# Patient Record
Sex: MALE | Race: WHITE | Employment: OTHER | URBAN - METROPOLITAN AREA
[De-identification: names, ages, dates, MRNs, and addresses within clinical notes are randomized per-mention and may not be internally consistent; named-entity substitution may affect disease eponyms.]

---

## 2017-01-02 RX ORDER — METFORMIN HYDROCHLORIDE 500 MG/1
TABLET, EXTENDED RELEASE ORAL
Qty: 90 TAB | Refills: 1 | Status: SHIPPED | OUTPATIENT
Start: 2017-01-02 | End: 2017-10-20 | Stop reason: SDUPTHER

## 2017-01-30 RX ORDER — ATORVASTATIN CALCIUM 10 MG/1
TABLET, FILM COATED ORAL
Qty: 30 TAB | Refills: 2 | Status: SHIPPED | OUTPATIENT
Start: 2017-01-30 | End: 2017-05-09 | Stop reason: SDUPTHER

## 2017-07-09 RX ORDER — ATORVASTATIN CALCIUM 10 MG/1
TABLET, FILM COATED ORAL
Qty: 30 TAB | Refills: 0 | Status: SHIPPED | OUTPATIENT
Start: 2017-07-09 | End: 2017-09-09 | Stop reason: SDUPTHER

## 2017-10-20 NOTE — LETTER
10/23/2017 9:34 AM 
 
Mr. Mckeon Angry [address] [facility] Dear Mr. Johnson Jerica: 
 
Quan Rosales missed you! Please call our office at [phone] and schedule a follow up appointment for your continued care. Patient overdue for appointment with Dr Tony Drew for follow up on issues and medications. Sincerely, Jad Hernandez MD

## 2017-10-22 RX ORDER — METFORMIN HYDROCHLORIDE 500 MG/1
TABLET, EXTENDED RELEASE ORAL
Qty: 90 TAB | Refills: 1 | Status: SHIPPED | OUTPATIENT
Start: 2017-10-22 | End: 2018-03-31 | Stop reason: SDUPTHER

## 2017-10-22 RX ORDER — ATORVASTATIN CALCIUM 10 MG/1
TABLET, FILM COATED ORAL
Qty: 30 TAB | Refills: 0 | Status: SHIPPED | OUTPATIENT
Start: 2017-10-22 | End: 2017-12-16 | Stop reason: SDUPTHER

## 2017-10-23 ENCOUNTER — TELEPHONE (OUTPATIENT)
Dept: INTERNAL MEDICINE CLINIC | Age: 62
End: 2017-10-23

## 2017-12-17 RX ORDER — ATORVASTATIN CALCIUM 10 MG/1
TABLET, FILM COATED ORAL
Qty: 30 TAB | Refills: 0 | Status: SHIPPED | OUTPATIENT
Start: 2017-12-17 | End: 2018-02-02 | Stop reason: SDUPTHER

## 2017-12-18 ENCOUNTER — OFFICE VISIT (OUTPATIENT)
Dept: INTERNAL MEDICINE CLINIC | Age: 62
End: 2017-12-18

## 2017-12-18 VITALS
WEIGHT: 243 LBS | HEART RATE: 80 BPM | OXYGEN SATURATION: 97 % | BODY MASS INDEX: 34.02 KG/M2 | SYSTOLIC BLOOD PRESSURE: 149 MMHG | HEIGHT: 71 IN | TEMPERATURE: 98 F | RESPIRATION RATE: 15 BRPM | DIASTOLIC BLOOD PRESSURE: 79 MMHG

## 2017-12-18 DIAGNOSIS — J06.9 UPPER RESPIRATORY TRACT INFECTION, UNSPECIFIED TYPE: ICD-10-CM

## 2017-12-18 DIAGNOSIS — T78.3XXA ANGIOEDEMA, INITIAL ENCOUNTER: ICD-10-CM

## 2017-12-18 DIAGNOSIS — E11.9 TYPE 2 DIABETES MELLITUS WITHOUT COMPLICATION, WITHOUT LONG-TERM CURRENT USE OF INSULIN (HCC): Primary | ICD-10-CM

## 2017-12-18 NOTE — PROGRESS NOTES
Livia Dotson for a week    Subjective: This is a 60year old white male with diabetes and hyperlipidemia and possibly hypertension. Chief complaint:  Yesterday he developed some itching on his arms and developed swelling of his tongue out of the blue. The bottom of his tongue was a little swollen. His lips were not swollen. The back of his throat was not swollen. He took a Benadryl and sat in his recliner and it got better after two doses. He had this once before one to one and a half years ago. He was last seen by me in 2016. He takes Metformin daily and takes Lipitor daily. He used to take aspirin daily, but has not been doing that. He has no new meds, no blood pressure meds. He has a cold, which has been slowly improving. He has a little bit of cough and some yellow green nasal mucus. He has been very busy over the last few years. His wife was diagnosed with leukemia and they have been traveling back and forth to [facility] in [place]. He has not eaten a lot of seafood. Had some nuts yesterday. No other exact cause for the angioedema. Physical Examination:  His blood pressure today was 140/80. His lips were not swollen, his tongue was not swollen. The frenulum was slightly swollen. The posterior pharynx was normal.  Chest was clear. Regular heart rhythm. No sinus tenderness. Impression/Plan:  1. Angioedema, etiology not clear. Routine lab work. He's advised an allergy referral should it continue. Benadryl prn is a good idea. 2. Monitoring his blood pressure, monitoring blood sugars are important. Diet and exercise are important. He's given a slip to complete lab work. 3. URI. Will resolve. Prn Mucinex and OTC meds. 4. History of nasal spray dependence. Would like to switch him to Flonase to help him get off of the nasal spray.     Patient Active Problem List    Diagnosis    Lymphadenopathy    Dyslipidemia (high LDL; low HDL)    Asthma    Arthralgia of knee    Diabetes mellitus ([facility] [address] [address]    Diverticular disease of left colon    BPH (benign prostatic hyperplasia)    ED (erectile dysfunction)    Gout     Pain free       Vitals:    12/18/17 1631   BP: 149/79   Pulse: 80   Resp: 15   Temp: 98 °F (36.7 °C)   TempSrc: Oral   SpO2: 97%   Weight: 243 lb (110.2 kg)   Height: 5' 11\" (1.803 m)     Wt Readings from Last 3 Encounters:   12/18/17 243 lb (110.2 kg)   03/17/16 237 lb (107.5 kg)   02/02/16 242 lb (109.8 kg)     1. Type 2 diabetes mellitus without complication, without long-term current use of insulin ([facility] [address]  Home reasdiongs ok  - CBC WITH AUTOMATED DIFF  - LIPID PANEL  - METABOLIC PANEL, COMPREHENSIVE  - MICROALBUMIN, UR, RAND W/ MICROALBUMIN/CREA RATIO  - HEMOGLOBIN A1C W/O EAG  - URIC ACID    2. Upper respiratory tract infection, unspecified type  benign    3. Angioedema, initial encounter  cause unknown  - CBC WITH AUTOMATED DIFF    Hypertension (elevated blood pressure)  - My Recommendations  -Purchase a blood pressure cuff that goes around your upper arm and check blood pressure at least 3 times per week. Write down your numbers for review. Check blood pressure in the morning and evening. Rest for 5 minutes with feet elevated and arm resting on a table (at mid-chest level) when checking blood pressure  -Exercise 30-60 minutes most days of the week, preferably with a mix of cardiovascular and strength training. Exercise can improve blood pressure, even if you do not lose weight!  -Limit alcohol intake to less than 2-3 drinks daily   -Avoid tobacco products  -Avoid caffeine, energy drinks, stimulants  -DASH diet - includes fruits, vegetables, fiber, and less than 2000 mg sodium (salt) daily. Try to eat less than 3547-4680 calories daily.    Limit fat intake.    -Avoid non-steroidal inflammatory medications (NSAIDS) such as ibuprofen, advil, motrin, aleve, and naproxyn as these may increase blood pressure if used long-term  -Avoid OTC decongestants such as pseudopherine, phenylephrine, Afrin'

## 2017-12-18 NOTE — PROGRESS NOTES
Coordination of Care Questions    1. Have you been to the ER, urgent care clinic since your last visit? No       Hospitalized since your last visit? No    2. Have you seen or consulted any other health care providers outside of the [address] [place] [address] since your last visit? Include any pap smears or colon screening.  No

## 2018-02-03 RX ORDER — ATORVASTATIN CALCIUM 10 MG/1
TABLET, FILM COATED ORAL
Qty: 30 TAB | Refills: 0 | Status: SHIPPED | OUTPATIENT
Start: 2018-02-03 | End: 2018-03-31 | Stop reason: SDUPTHER

## 2018-05-08 RX ORDER — ATORVASTATIN CALCIUM 10 MG/1
TABLET, FILM COATED ORAL
Qty: 90 TAB | Refills: 1 | Status: SHIPPED | OUTPATIENT
Start: 2018-05-08 | End: 2018-08-13 | Stop reason: SDUPTHER

## 2018-05-15 ENCOUNTER — TELEPHONE (OUTPATIENT)
Dept: INTERNAL MEDICINE CLINIC | Age: 63
End: 2018-05-15

## 2018-05-15 NOTE — TELEPHONE ENCOUNTER
Pt would like for the doctor to resend his lab work to his home address at [address] [place] [address] or e-mail it ([email or web address])   Best contact: [phone]              From answering service

## 2018-08-07 LAB
ALBUMIN SERPL-MCNC: 4.4 G/DL (ref 3.6–4.8)
ALBUMIN/CREAT UR: 6.7 MG/G CREAT (ref 0–30)
ALBUMIN/GLOB SERPL: 1.8 {RATIO} (ref 1.2–2.2)
ALP SERPL-CCNC: 74 IU/L (ref 39–117)
ALT SERPL-CCNC: 50 IU/L (ref 0–44)
AST SERPL-CCNC: 44 IU/L (ref 0–40)
BASOPHILS # BLD AUTO: 0 X10E3/UL (ref 0–0.2)
BASOPHILS NFR BLD AUTO: 0 %
BILIRUB SERPL-MCNC: 0.8 MG/DL (ref 0–1.2)
BUN SERPL-MCNC: 13 MG/DL (ref 8–27)
BUN/CREAT SERPL: 14 (ref 10–24)
CALCIUM SERPL-MCNC: 9.2 MG/DL (ref 8.6–10.2)
CHLORIDE SERPL-SCNC: 96 MMOL/L (ref 96–106)
CHOLEST SERPL-MCNC: 154 MG/DL (ref 100–199)
CO2 SERPL-SCNC: 26 MMOL/L (ref 20–29)
CREAT SERPL-MCNC: 0.92 MG/DL (ref 0.76–1.27)
CREAT UR-MCNC: 78.8 MG/DL
EOSINOPHIL # BLD AUTO: 0.2 X10E3/UL (ref 0–0.4)
EOSINOPHIL NFR BLD AUTO: 3 %
ERYTHROCYTE [DISTWIDTH] IN BLOOD BY AUTOMATED COUNT: 15.5 % (ref 12.3–15.4)
GLOBULIN SER CALC-MCNC: 2.5 G/DL (ref 1.5–4.5)
GLUCOSE SERPL-MCNC: 223 MG/DL (ref 65–99)
HBA1C MFR BLD: 8 % (ref 4.8–5.6)
HCT VFR BLD AUTO: 40.9 % (ref 37.5–51)
HDLC SERPL-MCNC: 33 MG/DL
HGB BLD-MCNC: 14 G/DL (ref 13–17.7)
IMM GRANULOCYTES # BLD: 0 X10E3/UL (ref 0–0.1)
IMM GRANULOCYTES NFR BLD: 0 %
INTERPRETATION, 910389: NORMAL
LDLC SERPL CALC-MCNC: 73 MG/DL (ref 0–99)
LYMPHOCYTES # BLD AUTO: 1.2 X10E3/UL (ref 0.7–3.1)
LYMPHOCYTES NFR BLD AUTO: 24 %
Lab: NORMAL
MCH RBC QN AUTO: 30.3 PG (ref 26.6–33)
MCHC RBC AUTO-ENTMCNC: 34.2 G/DL (ref 31.5–35.7)
MCV RBC AUTO: 89 FL (ref 79–97)
MICROALBUMIN UR-MCNC: 5.3 UG/ML
MONOCYTES # BLD AUTO: 0.3 X10E3/UL (ref 0.1–0.9)
MONOCYTES NFR BLD AUTO: 7 %
NEUTROPHILS # BLD AUTO: 3.4 X10E3/UL (ref 1.4–7)
NEUTROPHILS NFR BLD AUTO: 66 %
PLATELET # BLD AUTO: 103 X10E3/UL (ref 150–379)
POTASSIUM SERPL-SCNC: 4.5 MMOL/L (ref 3.5–5.2)
PROT SERPL-MCNC: 6.9 G/DL (ref 6–8.5)
RBC # BLD AUTO: 4.62 X10E6/UL (ref 4.14–5.8)
SODIUM SERPL-SCNC: 138 MMOL/L (ref 134–144)
TRIGL SERPL-MCNC: 239 MG/DL (ref 0–149)
URATE SERPL-MCNC: 6.9 MG/DL (ref 3.7–8.6)
VLDLC SERPL CALC-MCNC: 48 MG/DL (ref 5–40)
WBC # BLD AUTO: 5.1 X10E3/UL (ref 3.4–10.8)

## 2018-08-07 RX ORDER — METFORMIN HYDROCHLORIDE 500 MG/1
1500 TABLET, EXTENDED RELEASE ORAL
Qty: 270 TAB | Refills: 1 | Status: SHIPPED | OUTPATIENT
Start: 2018-08-07 | End: 2019-02-01 | Stop reason: SDUPTHER

## 2018-08-08 NOTE — PROGRESS NOTES
Spoke with patient advised per Dr Jennifer Albarado that Diabetes poor increase metformin to 1500 mg per day.  Scheduled an appointment for a 3 months follow up

## 2018-08-13 ENCOUNTER — OFFICE VISIT (OUTPATIENT)
Dept: INTERNAL MEDICINE CLINIC | Age: 63
End: 2018-08-13

## 2018-08-13 VITALS
SYSTOLIC BLOOD PRESSURE: 129 MMHG | BODY MASS INDEX: 33.52 KG/M2 | TEMPERATURE: 96.2 F | WEIGHT: 239.4 LBS | DIASTOLIC BLOOD PRESSURE: 69 MMHG | OXYGEN SATURATION: 98 % | HEART RATE: 63 BPM | HEIGHT: 71 IN | RESPIRATION RATE: 18 BRPM

## 2018-08-13 DIAGNOSIS — E78.5 DYSLIPIDEMIA (HIGH LDL; LOW HDL): ICD-10-CM

## 2018-08-13 DIAGNOSIS — L71.9 ACNE ROSACEA: ICD-10-CM

## 2018-08-13 DIAGNOSIS — E11.9 TYPE 2 DIABETES MELLITUS WITHOUT COMPLICATION, WITHOUT LONG-TERM CURRENT USE OF INSULIN (HCC): Primary | ICD-10-CM

## 2018-08-13 RX ORDER — DOXYCYCLINE 100 MG/1
100 CAPSULE ORAL DAILY
Qty: 90 CAP | Refills: 1 | Status: SHIPPED | OUTPATIENT
Start: 2018-08-13 | End: 2019-02-01 | Stop reason: SDUPTHER

## 2018-08-13 RX ORDER — ATORVASTATIN CALCIUM 10 MG/1
TABLET, FILM COATED ORAL
Qty: 90 TAB | Refills: 1 | Status: SHIPPED | OUTPATIENT
Start: 2018-08-13 | End: 2019-05-02 | Stop reason: SDUPTHER

## 2018-08-13 NOTE — MR AVS SNAPSHOT
[address] [place] [address] [address] [place] [address] 
[phone] Patient: Channing Perdue MRN: [id number] Visit Information Date & Time Provider Department Dept. Phone Encounter #  
 8/13/2018  8:30 AM Lyudmila Szymanski MD [facility] [phone] [id number] Your Appointments 2/13/2019  8:30 AM  
ROUTINE CARE with Lyudmila Szymanski MD  
[facility]: 6 month f/up [place] [address] [facility] [address]  
[facility] [address] [address] [address] [address] Upcoming Health Maintenance Date Due Hepatitis C Screening 1955 COLONOSCOPY 8/20/2014 EYE EXAM RETINAL OR DILATED Q1 2/3/2016 FOOT EXAM Q1 12/2/2016 Influenza Age 5 to Adult 8/1/2018 HEMOGLOBIN A1C Q6M 2/6/2019 MICROALBUMIN Q1 8/6/2019 LIPID PANEL Q1 8/6/2019 DTaP/Tdap/Td series (2 - Td) 3/22/2023 Allergies as of 8/13/2018  Review Complete On: 8/13/2018 By: Alison Alba LPN No Known Allergies Current Immunizations  Reviewed on 2/5/2018 Name Date Influenza Vaccine 11/18/2013 Influenza Vaccine (Quad) PF 2/2/2018, 12/2/2015, 12/2/2014 Pneumococcal Polysaccharide (PPSV-23) 7/15/2013 Tdap 3/22/2013 Zoster Vaccine, Live 6/2/2015 Not reviewed this visit You Were Diagnosed With   
  
 Codes Comments Type 2 diabetes mellitus without complication, without long-term current use of insulin (HCC)    -  Primary ICD-10-CM: E11.9 ICD-9-CM: 250.00 Acne rosacea     ICD-10-CM: L71.9 ICD-9-CM: 695.3 Dyslipidemia (high LDL; low HDL)     ICD-10-CM: E78.5 ICD-9-CM: 272.4 Vitals BP Pulse Temp Resp Height(growth percentile) Weight(growth percentile)  129/69 (BP 1 Location: Right arm, BP Patient Position: Sitting) 63 96.2 °F (35.7 °C) (Oral) 18 5' 11\" (1.803 m) 239 lb 6.4 oz (108.6 kg) SpO2 BMI Smoking Status 98% 33.39 kg/m2 Never Smoker Vitals History BMI and BSA Data Body Mass Index Body Surface Area  
 33.39 kg/m 2 2.33 m 2 Preferred Pharmacy Pharmacy Name Phone [id number]- [address] [place] [phone] Your Updated Medication List  
  
   
This list is accurate as of 18  9:05 AM.  Always use your most recent med list.  
  
  
  
  
 aspirin delayed-release 81 mg tablet Take 81 mg by mouth nightly. atorvastatin 10 mg tablet Commonly known as:  LIPITOR  
TAKE 1 TABLET EVERY DAY  
  
 doxycycline 100 mg capsule Commonly known as:  VIBRAMYCIN Take 1 Cap by mouth daily. ibuprofen 200 mg tablet Commonly known as:  MOTRIN Take 800 mg by mouth as needed. metFORMIN  mg tablet Commonly known as:  GLUCOPHAGE XR Take 3 Tabs by mouth daily (with dinner). metroNIDAZOLE 1 % topical gel Commonly known as:  Kaye Chowdary Apply  to affected area daily. Use a thin layer to affected areas after washing  
  
 varicella-zoster recombinant (PF) 50 mcg/0.5 mL Susr injection Commonly known as:  SHINGRIX (PF)  
0.5 mL by IntraMUSCular route once for 1 dose. Indications: PREVENTION OF HERPES ZOSTER Prescriptions Printed Refills  
 varicella-zoster recombinant, PF, (SHINGRIX, PF,) 50 mcg/0.5 mL susr injection 1 Si.5 mL by IntraMUSCular route once for 1 dose. Indications: PREVENTION OF HERPES ZOSTER Class: Print Route: IntraMUSCular Prescriptions Sent to Pharmacy Refills  
 doxycycline (VIBRAMYCIN) 100 mg capsule 1 Sig: Take 1 Cap by mouth daily. Class: Normal  
 Pharmacy: [address] [place] [address] [address] Ph #: [phone]  Route: Oral  
 atorvastatin (LIPITOR) 10 mg tablet 1 Sig: TAKE 1 TABLET EVERY DAY Class: Normal  
 [facility]  #: [phone] Introducing [facility] SERVICES! [facility] introduces [employer] patient portal. Now you can access parts of your medical record, email your doctor's office, and request medication refills online. 1. In your internet browser, go to [email or web address] 2. Click on the First Time User? Click Here link in the Sign In box. You will see the New Member Sign Up page. 3. Enter your [employer] Access Code exactly as it appears below. You will not need to use this code after youve completed the sign-up process. If you do not sign up before the expiration date, you must request a new code. · [employer] Access Code: [email or web address] Expires: 11/11/2018  9:05 AM 
 
4. Enter the last four digits of your Social Security Number (xxxx) and Date of Birth (mm/dd/yyyy) as indicated and click Submit. You will be taken to the next sign-up page. 5. Create a [employer] ID. This will be your [employer] login ID and cannot be changed, so think of one that is secure and easy to remember. 6. Create a [employer] password. You can change your password at any time. 7. Enter your Password Reset Question and Answer. This can be used at a later time if you forget your password. 8. Enter your e-mail address. You will receive e-mail notification when new information is available in [address] 9. Click Sign Up. You can now view and download portions of your medical record. 10. Click the Download Summary menu link to download a portable copy of your medical information. If you have questions, please visit the Frequently Asked Questions section of the [employer] website. Remember, [employer] is NOT to be used for urgent needs. For medical emergencies, dial 911. Now available from your iPhone and Android! Please provide this summary of care documentation to your next provider. Your primary care clinician is listed as Eryn Becerra. If you have any questions after today's visit, please call [phone].

## 2018-08-13 NOTE — PROGRESS NOTES
Health Maintenance Due   Topic Date Due    Hepatitis C Screening  1955    COLONOSCOPY  06/25/1972    EYE EXAM RETINAL OR DILATED Q1  02/03/2016    FOOT EXAM Q1  12/02/2016    Influenza Age 9 to Adult  08/01/2018       Chief Complaint   Patient presents with    Diabetes     last A1C on 8/6/18 was 8.0%    Erectile Dysfunction    Benign Prostatic Hypertrophy    Asthma    Cholesterol Problem       1. Have you been to the ER, urgent care clinic since your last visit? Hospitalized since your last visit? No    2. Have you seen or consulted any other health care providers outside of the [address] [place] [address] since your last visit? Include any pap smears or colon screening. No    3) Do you have an Advance Directive on file? no    4) Are you interested in receiving information on Advance Directives? NO      Patient is accompanied by self I have received verbal consent from Sanchez Counter to discuss any/all medical information while they are present in the room.

## 2018-08-13 NOTE — PROGRESS NOTES
SUBJECTIVE: Channing Perdue is a 58 y.o. male seen for a follow up visit; he has diabetes, hypertension, obesity and gout. Current Outpatient Prescriptions   Medication Sig Dispense Refill    doxycycline (VIBRAMYCIN) 100 mg capsule Take 1 Cap by mouth daily. 90 Cap 1    varicella-zoster recombinant, PF, (SHINGRIX, PF,) 50 mcg/0.5 mL susr injection 0.5 mL by IntraMUSCular route once for 1 dose. Indications: PREVENTION OF HERPES ZOSTER 0.5 mL 1    atorvastatin (LIPITOR) 10 mg tablet TAKE 1 TABLET EVERY DAY 90 Tab 1    metFORMIN ER (GLUCOPHAGE XR) 500 mg tablet Take 3 Tabs by mouth daily (with dinner). 270 Tab 1    ibuprofen (MOTRIN) 200 mg tablet Take 800 mg by mouth as needed.  metroNIDAZOLE (METROGEL) 1 % topical gel Apply  to affected area daily. Use a thin layer to affected areas after washing (Patient taking differently: Apply  to affected area as needed. Use a thin layer to affected areas after washing) 45 g 1    aspirin delayed-release 81 mg tablet Take 81 mg by mouth nightly. Patient Active Problem List   Diagnosis Code    Gout M10.9    Diverticular disease of left colon K57.30    ED (erectile dysfunction) N52.9    BPH (benign prostatic hyperplasia) N40.0    Diabetes mellitus (HCC) E11.9    Arthralgia of knee M25.569    Asthma J45.909    Dyslipidemia (high LDL; low HDL) E78.5    Lymphadenopathy R59.1     System Review: Cardiovascular ROS - taking medications as instructed, no medication side effects noted, no TIA's, no chest pain on exertion, no dyspnea on exertion, no swelling of ankles, Diabetic ROS - medication compliance: compliant all of the time, diabetic diet compliance: compliant most of the time, home glucose monitoring: is performed sporadically. fasting better on 1500 metformin  New concerns: no gout  DJD knees looking at stem cell  Needs colonoscopy  No gu issues    Cardiovascular ROS: no chest pain or dyspnea on exertion  Neurological ROS: no TIA or stroke symptoms  General ROS: negative for - chills, fatigue, fever, malaise, night sweats or sleep disturbance  Endocrine ROS: negative for - hair pattern changes, hot flashes, malaise/lethargy, palpitations, polydipsia/polyuria, temperature intolerance or unexpected weight changes  . OBJECTIVE:  Visit Vitals    /69 (BP 1 Location: Right arm, BP Patient Position: Sitting)    Pulse 63    Temp 96.2 °F (35.7 °C) (Oral)    Resp 18    Ht 5' 11\" (1.803 m)    Wt 239 lb 6.4 oz (108.6 kg)    SpO2 98%    BMI 33.39 kg/m2      Appearance: alert, well appearing, and in no distress, oriented to person, place, and time, overweight and facial erythema   And telangiectasia. General exam: CVS exam BP noted to be well controlled today in office, S1, S2 normal, no gallop, no murmur, chest clear, no JVD, no HSM, no edema. Lab review: labs reviewed, I note that glycosylated hemoglobin abnormal   Lab Results   Component Value Date/Time    Hemoglobin A1c 8.0 (H) 08/06/2018 02:58 PM    Hemoglobin A1c (POC) 5.6 12/02/2015 12:00 PM     Lab Results   Component Value Date/Time    Cholesterol, total 154 08/06/2018 02:58 PM    HDL Cholesterol 33 (L) 08/06/2018 02:58 PM    LDL, calculated 73 08/06/2018 02:58 PM    VLDL, calculated 48 (H) 08/06/2018 02:58 PM    Triglyceride 239 (H) 08/06/2018 02:58 PM     .     ASSESSMENT:  diabetes poorly controlled, hyperlipidemia stable, needs further observation, needs improvement, needs to follow diet more regularly, rosacea on face failed metrogel will tyry doxy then see derm if not improved. PLAN:  lab results and schedule of future lab studies reviewed with patient  copy of written low fat low cholesterol diet provided and reviewed  cardiovascular risk and specific lipid/LDL goals reviewed  reviewed medications and side effects in detail  reviewed potential future medication changes and side effects  use of aspirin to prevent MI and TIA's discussed.   1. Acne rosacea  add  - doxycycline (VIBRAMYCIN) 100 mg capsule; Take 1 Cap by mouth daily. Dispense: 90 Cap; Refill: 1    2. Dyslipidemia (high LDL; low HDL)  Low hdl discussed exercise poor due to djd knees    3.  Type 2 diabetes mellitus without complication, without long-term current use of insulin (HCC)  On 1500 metformin now  With better home readingd    A1c 3 to 6 months

## 2018-10-10 ENCOUNTER — PATIENT OUTREACH (OUTPATIENT)
Dept: INTERNAL MEDICINE CLINIC | Age: 63
End: 2018-10-10

## 2018-10-10 NOTE — PROGRESS NOTES
Patient has been identified as having a HgbA1C above goal. Contacted patient to schedule appointment with Dr. Zachary Addison and the diabetes interdisciplinary care team. Appointment has been scheduled on 11/8 @ 8:20 AM.

## 2018-11-08 ENCOUNTER — OFFICE VISIT (OUTPATIENT)
Dept: INTERNAL MEDICINE CLINIC | Age: 63
End: 2018-11-08

## 2018-11-08 VITALS
DIASTOLIC BLOOD PRESSURE: 75 MMHG | HEIGHT: 71 IN | WEIGHT: 243.2 LBS | SYSTOLIC BLOOD PRESSURE: 138 MMHG | BODY MASS INDEX: 34.05 KG/M2 | HEART RATE: 67 BPM

## 2018-11-08 DIAGNOSIS — E11.9 TYPE 2 DIABETES MELLITUS WITHOUT COMPLICATION, WITHOUT LONG-TERM CURRENT USE OF INSULIN (HCC): Primary | ICD-10-CM

## 2018-11-08 NOTE — PROGRESS NOTES
Chief Complaint   Patient presents with    New Patient    Diabetes     Last A1c 8% (8/6/18)    Diabetic Foot Exam     Due    Other     Checks blood sugars on an as needed basis. HPI  This is a very pleasant 69-year-old white male with a history of type 2 diabetes mellitus times about 5 years referred for evaluation and management. His wife also has diabetes and he found out that he had diabetes very shortly after she did. They together went on a very low carbohydrate and his hemoglobin A1c was in the 5% range. Over the last several years he has paid less attention to his diabetes and his most recent A1c was 8.0%. He had been on a single tablet of metformin which was recently increased to 3 tablets which he takes all of at dinner. He works as a  and [employer] as a hobby. His day is usually in the car going between houses. Since being on the 3 tablets of metformin daily, his morning blood sugars have ranged between 140 and 150. He usually does not have breakfast but does have a pack of peanut butter crackers and some drop tea which is unsweetened tea. Lunch is another pack of peanut butter crackers or an apple. He will snack on another piece of fruit in the afternoon. [employer] is a traditional meal of the meat starch and a vegetable. Last night he had turkey with dressing and potatoes. More often he will have broccoli or other non-starchy vegetables. He snacks on apples or popcorn at night. His activity is limited by degenerative disease in his knees. He has not received steroid injections. ROS  He denies chest pain, shortness of breath, constipation or diarrhea.   Family History   Problem Relation Age of Onset    Mental Retardation Mother     Cancer Father         lung    Diabetes Maternal Grandmother     Diabetes Paternal Grandmother         Social History     Socioeconomic History    Marital status:      Spouse name: Not on file    Number of children: Not on file    Years of education: Not on file    Highest education level: Not on file   Social Needs    Financial resource strain: Not on file    Food insecurity - worry: Not on file    Food insecurity - inability: Not on file    Transportation needs - medical: Not on file   Oculeve needs - non-medical: Not on file   Occupational History    Not on file   Tobacco Use    Smoking status: Never Smoker    Smokeless tobacco: Never Used   Substance and Sexual Activity    Alcohol use: Yes     Comment: 1 beer per month    Drug use: No    Sexual activity: Yes     Partners: Female   Other Topics Concern    Not on file   Social History Narrative    Not on file        Vitals:    11/08/18 0837   BP: 138/75   Pulse: 67   Weight: 243 lb 3.2 oz (110.3 kg)   Height: 5' 11\" (1.803 m)   PainSc:   7   PainLoc: Knee        Physical Examination: General appearance - alert, well appearing, and in no distress  Mental status - alert, oriented to person, place, and time  Neck - supple, no significant adenopathy, thyroid exam: thyroid is normal in size without nodules or tenderness  Chest - clear to auscultation, no wheezes, rales or rhonchi, symmetric air entry  Heart - normal rate, regular rhythm, normal S1, S2, no murmurs, rubs, clicks or gallops  Abdomen - soft, nontender, nondistended, no masses or organomegaly  Extremities - peripheral pulses normal, no pedal edema, no clubbing or cyanosis    Diabetic foot exam:     Left: Reflexes 2+     Vibratory sensation normal    Filament test normal sensation with micro filament   Pulse DP: 2+ (normal)   Pulse PT: 2+ (normal)   Deformities: None  Right: Reflexes 2+   Vibratory sensation normal   Filament test normal sensation with micro filament   Pulse DP: 2+ (normal)   Pulse PT: 2+ (normal)   Deformities: None      ASSESSMENT & PLAN  This gentleman has type 2 diabetes mellitus with recent elevation in hemoglobin A1c which he attributes almost exclusively to paying less attention to his diabetes. He would like very much not to go on more medication than he is currently taking so we have done the followin. We have asked him to check blood sugars in the morning and before dinner to assess the impact of what is eating during the day  2. We have asked him to check blood sugars before dinner and at bedtime to determine the impact of dinner on his overall blood sugar control. 3.  He will enter all these readings in my chart and I will stay in contact with him. 4.  He will return in 1 month. He appears motivated to do this with lifestyle strategies which would certainly support.

## 2018-12-06 ENCOUNTER — OFFICE VISIT (OUTPATIENT)
Dept: INTERNAL MEDICINE CLINIC | Age: 63
End: 2018-12-06

## 2018-12-06 VITALS
SYSTOLIC BLOOD PRESSURE: 150 MMHG | HEIGHT: 71 IN | BODY MASS INDEX: 34.3 KG/M2 | HEART RATE: 68 BPM | WEIGHT: 245 LBS | DIASTOLIC BLOOD PRESSURE: 78 MMHG

## 2018-12-06 DIAGNOSIS — E11.9 TYPE 2 DIABETES MELLITUS WITHOUT COMPLICATION, WITHOUT LONG-TERM CURRENT USE OF INSULIN (HCC): Primary | ICD-10-CM

## 2018-12-06 NOTE — PROGRESS NOTES
Mr. Westbrook Level returns for follow-up of his type 2 diabetes mellitus currently on metformin 1500 mg daily. When he saw him for the first time, our intention was for him to check a few extra blood sugars so that we could determine whether the 3 tablets of metformin were sufficient. Unfortunately his wife who is being treated for lymphoma had a episode of neutropenia and so the last month has been busy going back and forth to [place]. Things are beginning to get back into the normal range. He checks his blood sugars occasionally in the morning and they have been ranging from 135 245. He continues to tolerate the metformin well. Breakfast is granola and fruit or peanut butter crackers. If he has lunch she will have a hot dog but without the bun. Last night for dinner he had meat loaf coleslaw and green beans and 2 cookies. Examination  Blood pressure 150/78  Pulse 68  Weight 245    Impression type 2 diabetes mellitus  Plan: All laboratory tests were obtained and I will call him today with the results. Given the fact that we have not done much in the way of his diabetes in the past month because of his wife's illness, I am not sure that the A1c will change very much but he is on a higher dose of metformin and that may sufficiently improve his overall blood sugar control. We will see him back on an as-needed basis. We spent more than 25 mintutes face to face, more than 50% was in counseling regarding diet, exercise and carbohydrate intake.

## 2018-12-07 LAB
ALBUMIN SERPL-MCNC: 4.8 G/DL (ref 3.6–4.8)
ALBUMIN/CREAT UR: 8.9 MG/G CREAT (ref 0–30)
ALBUMIN/GLOB SERPL: 1.8 {RATIO} (ref 1.2–2.2)
ALP SERPL-CCNC: 62 IU/L (ref 39–117)
ALT SERPL-CCNC: 38 IU/L (ref 0–44)
AST SERPL-CCNC: 37 IU/L (ref 0–40)
BASOPHILS # BLD AUTO: 0 X10E3/UL (ref 0–0.2)
BASOPHILS NFR BLD AUTO: 0 %
BILIRUB SERPL-MCNC: 0.8 MG/DL (ref 0–1.2)
BUN SERPL-MCNC: 11 MG/DL (ref 8–27)
BUN/CREAT SERPL: 11 (ref 10–24)
CALCIUM SERPL-MCNC: 9.4 MG/DL (ref 8.6–10.2)
CHLORIDE SERPL-SCNC: 99 MMOL/L (ref 96–106)
CHOLEST SERPL-MCNC: 125 MG/DL (ref 100–199)
CO2 SERPL-SCNC: 26 MMOL/L (ref 20–29)
CREAT SERPL-MCNC: 1.02 MG/DL (ref 0.76–1.27)
CREAT UR-MCNC: 119.1 MG/DL
EOSINOPHIL # BLD AUTO: 0.1 X10E3/UL (ref 0–0.4)
EOSINOPHIL NFR BLD AUTO: 2 %
ERYTHROCYTE [DISTWIDTH] IN BLOOD BY AUTOMATED COUNT: 15.4 % (ref 12.3–15.4)
GLOBULIN SER CALC-MCNC: 2.7 G/DL (ref 1.5–4.5)
GLUCOSE SERPL-MCNC: 146 MG/DL (ref 65–99)
HBA1C MFR BLD: 6.3 % (ref 4.8–5.6)
HCT VFR BLD AUTO: 43.1 % (ref 37.5–51)
HDLC SERPL-MCNC: 38 MG/DL
HGB BLD-MCNC: 14.6 G/DL (ref 13–17.7)
IMM GRANULOCYTES # BLD: 0 X10E3/UL (ref 0–0.1)
IMM GRANULOCYTES NFR BLD: 1 %
INTERPRETATION, 910389: NORMAL
LDLC SERPL CALC-MCNC: 60 MG/DL (ref 0–99)
LYMPHOCYTES # BLD AUTO: 1.3 X10E3/UL (ref 0.7–3.1)
LYMPHOCYTES NFR BLD AUTO: 24 %
Lab: NORMAL
MCH RBC QN AUTO: 30.1 PG (ref 26.6–33)
MCHC RBC AUTO-ENTMCNC: 33.9 G/DL (ref 31.5–35.7)
MCV RBC AUTO: 89 FL (ref 79–97)
MICROALBUMIN UR-MCNC: 10.6 UG/ML
MONOCYTES # BLD AUTO: 0.4 X10E3/UL (ref 0.1–0.9)
MONOCYTES NFR BLD AUTO: 8 %
NEUTROPHILS # BLD AUTO: 3.6 X10E3/UL (ref 1.4–7)
NEUTROPHILS NFR BLD AUTO: 65 %
PLATELET # BLD AUTO: 112 X10E3/UL (ref 150–379)
POTASSIUM SERPL-SCNC: 4.2 MMOL/L (ref 3.5–5.2)
PROT SERPL-MCNC: 7.5 G/DL (ref 6–8.5)
RBC # BLD AUTO: 4.85 X10E6/UL (ref 4.14–5.8)
SODIUM SERPL-SCNC: 140 MMOL/L (ref 134–144)
TRIGL SERPL-MCNC: 133 MG/DL (ref 0–149)
VLDLC SERPL CALC-MCNC: 27 MG/DL (ref 5–40)
WBC # BLD AUTO: 5.5 X10E3/UL (ref 3.4–10.8)

## 2019-02-13 ENCOUNTER — OFFICE VISIT (OUTPATIENT)
Dept: INTERNAL MEDICINE CLINIC | Age: 64
End: 2019-02-13

## 2019-02-13 VITALS
HEART RATE: 74 BPM | SYSTOLIC BLOOD PRESSURE: 120 MMHG | OXYGEN SATURATION: 98 % | WEIGHT: 244 LBS | HEIGHT: 71 IN | BODY MASS INDEX: 34.16 KG/M2 | TEMPERATURE: 97.8 F | DIASTOLIC BLOOD PRESSURE: 82 MMHG

## 2019-02-13 DIAGNOSIS — E78.5 DYSLIPIDEMIA (HIGH LDL; LOW HDL): ICD-10-CM

## 2019-02-13 DIAGNOSIS — E11.9 TYPE 2 DIABETES MELLITUS WITHOUT COMPLICATION, WITHOUT LONG-TERM CURRENT USE OF INSULIN (HCC): ICD-10-CM

## 2019-02-13 DIAGNOSIS — L71.9 ACNE ROSACEA: ICD-10-CM

## 2019-02-13 DIAGNOSIS — Z23 ENCOUNTER FOR IMMUNIZATION: Primary | ICD-10-CM

## 2019-02-13 NOTE — PROGRESS NOTES
Chief Complaint Patient presents with  Follow-up 6 month follow up for diabetes, hypertension SUBJECTIVE: Amy Chacon is a 58 y.o. male seen for a follow up visit; he has diabetes and hyperlipidemia. Current Outpatient Medications Medication Sig Dispense Refill  doxycycline (VIBRAMYCIN) 100 mg capsule Take 1 Cap by mouth daily. 90 Cap 0  
 metFORMIN ER (GLUCOPHAGE XR) 500 mg tablet Take 3 Tabs by mouth daily (with dinner). 270 Tab 1  
 atorvastatin (LIPITOR) 10 mg tablet TAKE 1 TABLET EVERY DAY 90 Tab 1  
 metroNIDAZOLE (METROGEL) 1 % topical gel Apply  to affected area daily. Use a thin layer to affected areas after washing (Patient taking differently: Apply  to affected area as needed. Use a thin layer to affected areas after washing) 45 g 1  
 aspirin delayed-release 81 mg tablet Take 81 mg by mouth nightly.  ibuprofen (MOTRIN) 200 mg tablet Take 800 mg by mouth as needed. Patient Active Problem List  
Diagnosis Code  Gout M10.9  Diverticular disease of left colon K57.30  ED (erectile dysfunction) N52.9  BPH (benign prostatic hyperplasia) N40.0  Diabetes mellitus ([address] E11.9  Arthralgia of knee M25.569  Asthma J45.909  Dyslipidemia (high LDL; low HDL) E78.5  Lymphadenopathy R59.1 System Review: Cardiovascular ROS - taking medications as instructed, no medication side effects noted, no TIA's, no chest pain on exertion, no dyspnea on exertion, no swelling of ankles. New concerns: home readings 120 to 170 Rosacea not imprtoved Lab Results Component Value Date/Time Hemoglobin A1c 6.3 (H) 12/06/2018 10:08 AM  
 Hemoglobin A1c (POC) 5.6 12/02/2015 12:00 PM  
 
. OBJECTIVE: 
Visit Vitals /82 Pulse 74 Temp 97.8 °F (36.6 °C) (Oral) Ht 5' 11\" (1.803 m) Wt 244 lb (110.7 kg) SpO2 98% BMI 34.03 kg/m² Appearance: alert, well appearing, and in no distress and overweight. General exam: CVS exam BP noted to be well controlled today in office, S1, S2 normal, no gallop, no murmur, chest clear, no JVD, no HSM, no edema. Red face Lab review: labs are reviewed, up to date and normal.  
 
ASSESSMENT: 
diabetes stable, hypertension mostly normal off meds, hyperlipidemia well controlled. PLAN: 
cardiovascular risk and specific lipid/LDL goals reviewed. Diagnoses and all orders for this visit: 
 
1. Type 2 diabetes mellitus without complication, without long-term current use of insulin ([facility] [address] 2. Acne rosacea 
-     REFERRAL TO DERMATOLOGY 3. Dyslipidemia (high LDL; low HDL) Prolonged visit with 15 minutes of time out  of more than a  25 minute visit spent counseling patient and family and formulation of care

## 2019-04-26 RX ORDER — CLINDAMYCIN HYDROCHLORIDE 300 MG/1
300 CAPSULE ORAL 3 TIMES DAILY
Qty: 30 CAP | Refills: 0 | Status: ON HOLD | OUTPATIENT
Start: 2019-04-26 | End: 2020-06-15

## 2019-05-01 DIAGNOSIS — L71.9 ACNE ROSACEA: ICD-10-CM

## 2019-05-01 RX ORDER — COLCHICINE 0.6 MG/1
0.6 TABLET ORAL 2 TIMES DAILY
Qty: 20 TAB | Refills: 1 | Status: SHIPPED | OUTPATIENT
Start: 2019-05-01 | End: 2022-03-28 | Stop reason: SDUPTHER

## 2019-05-01 RX ORDER — DOXYCYCLINE 100 MG/1
CAPSULE ORAL
Qty: 90 CAP | Refills: 0 | Status: ON HOLD | OUTPATIENT
Start: 2019-05-01 | End: 2020-06-15

## 2019-05-02 RX ORDER — ATORVASTATIN CALCIUM 10 MG/1
TABLET, FILM COATED ORAL
Qty: 90 TAB | Refills: 1 | Status: SHIPPED | OUTPATIENT
Start: 2019-05-02 | End: 2020-04-01 | Stop reason: SDUPTHER

## 2019-07-28 RX ORDER — METFORMIN HYDROCHLORIDE 500 MG/1
1500 TABLET, EXTENDED RELEASE ORAL
Qty: 270 TAB | Refills: 1 | Status: SHIPPED | OUTPATIENT
Start: 2019-07-28 | End: 2020-02-21

## 2020-04-01 ENCOUNTER — VIRTUAL VISIT (OUTPATIENT)
Dept: INTERNAL MEDICINE CLINIC | Age: 65
End: 2020-04-01

## 2020-04-01 DIAGNOSIS — R19.4 CHANGE IN BOWEL HABITS: ICD-10-CM

## 2020-04-01 DIAGNOSIS — E11.9 TYPE 2 DIABETES MELLITUS WITHOUT COMPLICATION, WITHOUT LONG-TERM CURRENT USE OF INSULIN (HCC): Primary | ICD-10-CM

## 2020-04-01 DIAGNOSIS — R03.0 BORDERLINE HYPERTENSION: ICD-10-CM

## 2020-04-01 DIAGNOSIS — E11.9 TYPE 2 DIABETES MELLITUS WITHOUT COMPLICATION, WITHOUT LONG-TERM CURRENT USE OF INSULIN (HCC): ICD-10-CM

## 2020-04-01 DIAGNOSIS — E78.5 DYSLIPIDEMIA (HIGH LDL; LOW HDL): ICD-10-CM

## 2020-04-01 RX ORDER — METFORMIN HYDROCHLORIDE 500 MG/1
TABLET, EXTENDED RELEASE ORAL
Qty: 270 TAB | Refills: 1 | Status: SHIPPED | OUTPATIENT
Start: 2020-04-01 | End: 2020-12-15

## 2020-04-01 RX ORDER — ATORVASTATIN CALCIUM 10 MG/1
TABLET, FILM COATED ORAL
Qty: 90 TAB | Refills: 1 | Status: SHIPPED | OUTPATIENT
Start: 2020-04-01 | End: 2020-10-05

## 2020-04-01 NOTE — PROGRESS NOTES
No chief complaint on file. THIS IS A VIRTUAL VISIT USING DOXY. ME SOFTWARE    SUBJECTIVE: Jose Antonio Wayne is a 71 y.o. male seen for a follow up visit; he has diabetes and hyperlipidemia. Current Outpatient Medications   Medication Sig Dispense Refill    metFORMIN ER (GLUCOPHAGE XR) 500 mg tablet TAKE 3 TABLETS BY MOUTH EVERY DAY WITH DINNER 90 Tab 0    atorvastatin (LIPITOR) 10 mg tablet TAKE 1 TABLET BY MOUTH EVERY DAY 90 Tab 1    doxycycline (VIBRAMYCIN) 100 mg capsule TAKE 1 CAPSULE BY MOUTH EVERY DAY 90 Cap 0    clindamycin (CLEOCIN) 300 mg capsule Take 1 Cap by mouth three (3) times daily. Indications: infection within the abdomen 30 Cap 0    metroNIDAZOLE (METROGEL) 1 % topical gel Apply  to affected area daily. Use a thin layer to affected areas after washing (Patient taking differently: Apply  to affected area as needed. Use a thin layer to affected areas after washing) 45 g 1    aspirin delayed-release 81 mg tablet Take 81 mg by mouth nightly.  ibuprofen (MOTRIN) 200 mg tablet Take 800 mg by mouth as needed. Patient Active Problem List   Diagnosis Code    Gout M10.9    Diverticular disease of left colon K57.30    ED (erectile dysfunction) N52.9    BPH (benign prostatic hyperplasia) N40.0    Diabetes mellitus (HCC) E11.9    Arthralgia of knee M25.569    Asthma J45.909    Dyslipidemia (high LDL; low HDL) E78.5    Lymphadenopathy R59.1     System Review: Cardiovascular ROS - taking medications as instructed, no medication side effects noted, no TIA's, no chest pain on exertion, no dyspnea on exertion, no swelling of ankles.   New concerns: home readings 120 to 170    Loose bowels at times  Overdue colonoscopy  No blood  Rosacea OK  Lab Results   Component Value Date/Time    Hemoglobin A1c 6.3 (H) 12/06/2018 10:08 AM    Hemoglobin A1c (POC) 5.6 12/02/2015 12:00 PM     .   BP Readings from Last 3 Encounters:   02/13/19 120/82   12/06/18 150/78   11/08/18 138/75       OBJECTIVE:  There were no vitals taken for this visit. Appearance: alert, well appearing, and in no distress and overweight. General exam: alert confortable      ASSESSMENT:  diabetes stable, hypertension mostly normal off meds   But not monitoring, hyperlipidemia well controlled. PLAN:  cardiovascular risk and specific lipid/LDL goals reviewed. 1. Type 2 diabetes mellitus without complication, without long-term current use of insulin (HCC)  Needs labs  - CBC WITH AUTOMATED DIFF; Future  - LIPID PANEL; Future  - METABOLIC PANEL, COMPREHENSIVE; Future  - HEMOGLOBIN A1C WITH EAG; Future  - MICROALBUMIN, UR, RAND W/ MICROALB/CREAT RATIO; Future    2. Dyslipidemia (high LDL; low HDL)  dollow    3.  Change in bowel habits  May be metformin but scope needed  - REFERRAL TO GASTROENTEROLOGY    4. Borderline hypertension  Home BP needed  Requested Prescriptions     Signed Prescriptions Disp Refills    metFORMIN ER (GLUCOPHAGE XR) 500 mg tablet 270 Tab 1     Sig: TAKE 3 TABLETS BY MOUTH EVERY DAY WITH DINNER    atorvastatin (LIPITOR) 10 mg tablet 90 Tab 1     Sig: TAKE 1 TABLET BY MOUTH EVERY DAY

## 2020-06-03 ENCOUNTER — HOSPITAL ENCOUNTER (EMERGENCY)
Age: 65
Discharge: HOME | End: 2020-06-03
Attending: EMERGENCY MEDICINE
Payer: COMMERCIAL

## 2020-06-03 VITALS
HEIGHT: 71 IN | OXYGEN SATURATION: 97 % | HEART RATE: 96 BPM | SYSTOLIC BLOOD PRESSURE: 157 MMHG | BODY MASS INDEX: 32.9 KG/M2 | DIASTOLIC BLOOD PRESSURE: 95 MMHG | TEMPERATURE: 98.2 F | WEIGHT: 235 LBS | RESPIRATION RATE: 16 BRPM

## 2020-06-03 DIAGNOSIS — S61.210A LACERATION OF RIGHT INDEX FINGER WITHOUT FOREIGN BODY WITHOUT DAMAGE TO NAIL, INITIAL ENCOUNTER: Primary | ICD-10-CM

## 2020-06-03 PROCEDURE — 99282 EMERGENCY DEPT VISIT SF MDM: CPT

## 2020-06-03 PROCEDURE — 75810000293 HC SIMP/SUPERF WND  RPR

## 2020-06-03 RX ORDER — LIDOCAINE HYDROCHLORIDE 10 MG/ML
10 INJECTION, SOLUTION EPIDURAL; INFILTRATION; INTRACAUDAL; PERINEURAL ONCE
Status: DISCONTINUED | OUTPATIENT
Start: 2020-06-03 | End: 2020-06-03 | Stop reason: HOSPADM

## 2020-06-03 RX ORDER — CEPHALEXIN 500 MG/1
500 CAPSULE ORAL 4 TIMES DAILY
Qty: 28 CAP | Refills: 0 | Status: SHIPPED | OUTPATIENT
Start: 2020-06-03 | End: 2022-01-19 | Stop reason: SDUPTHER

## 2020-06-03 NOTE — ED PROVIDER NOTES
HPI     Pt is a 71 y.o. M with PMH of DM here with c/o finger laceration. He has lac to right index finger. He is left hand dominant. Pt says he fell down steps and caught finger on a wire or something that ripped his skin. He went to urgent care and had a few sutures applied. However, they were unable to reattach the remaining skin thus they advised him to come to ED. He received tetanus PTA and xray that was without fracture. He has not had any difficulty moving finger. His numbing medicine is wearing off. No other complaints at this time.     Past Medical History:   Diagnosis Date    Arthralgia of knee 7/15/2013    Diabetes ([facility] [address]     type 2    Diverticular disease of left colon 3/22/2013    GERD (gastroesophageal reflux disease)     Gout 12/6/1985       Past Surgical History:   Procedure Laterality Date    HX COLONOSCOPY  2000    Dr. Eloisa Russell HX HEENT      deviated septum age 12    HX ORTHOPAEDIC  1974    right knee    HX OTHER SURGICAL  1/21/16    excision of lymph node right axilla    HX TONSILLECTOMY      child         Family History:   Problem Relation Age of Onset    Mental Retardation Mother     Cancer Father         lung    Diabetes Maternal Grandmother     Diabetes Paternal Grandmother        Social History     Socioeconomic History    Marital status:      Spouse name: Not on file    Number of children: Not on file    Years of education: Not on file    Highest education level: Not on file   Occupational History    Not on file   Social Needs    Financial resource strain: Not on file    Food insecurity     Worry: Not on file     Inability: Not on file    Transportation needs     Medical: Not on file     Non-medical: Not on file   Tobacco Use    Smoking status: Never Smoker    Smokeless tobacco: Never Used   Substance and Sexual Activity    Alcohol use: Yes     Comment: 1 beer per month    Drug use: No    Sexual activity: Yes     Partners: Female   Lifestyle  Physical activity     Days per week: Not on file     Minutes per session: Not on file    Stress: Not on file   Relationships    Social connections     Talks on phone: Not on file     Gets together: Not on file     Attends Sabianist service: Not on file     Active member of club or organization: Not on file     Attends meetings of clubs or organizations: Not on file     Relationship status: Not on file    Intimate partner violence     Fear of current or ex partner: Not on file     Emotionally abused: Not on file     Physically abused: Not on file     Forced sexual activity: Not on file   Other Topics Concern    Not on file   Social History Narrative    Not on file         ALLERGIES: Patient has no known allergies. Review of Systems   Constitutional: Negative for chills, diaphoresis and fever. HENT: Negative for congestion and trouble swallowing. Eyes: Negative for photophobia and visual disturbance. Respiratory: Negative for cough, chest tightness and shortness of breath. Cardiovascular: Negative for chest pain, palpitations and leg swelling. Gastrointestinal: Negative for abdominal pain, diarrhea, nausea and vomiting. Genitourinary: Negative for difficulty urinating, dysuria, flank pain and frequency. Musculoskeletal: Positive for arthralgias. Negative for back pain and myalgias. Skin: Positive for wound. Negative for rash. Neurological: Negative for dizziness, weakness, light-headedness and headaches. Hematological: Negative for adenopathy. Does not bruise/bleed easily. Psychiatric/Behavioral: Negative for agitation and confusion. All other systems reviewed and are negative. Vitals:    06/03/20 1709   BP: (!) 157/95   Pulse: 96   Resp: 16   Temp: 98.2 °F (36.8 °C)   SpO2: 97%   Weight: 106.6 kg (235 lb)   Height: 5' 11\" (1.803 m)            Physical Exam  Vitals signs reviewed. Constitutional:       General: He is not in acute distress.      Appearance: He is well-developed. He is not diaphoretic. HENT:      Head: Normocephalic and atraumatic. Eyes:      Pupils: Pupils are equal, round, and reactive to light. Neck:      Musculoskeletal: Normal range of motion. Cardiovascular:      Rate and Rhythm: Normal rate. Pulmonary:      Effort: Pulmonary effort is normal.   Abdominal:      General: There is no distension. Musculoskeletal: Normal range of motion. Right hand: He exhibits laceration and swelling. He exhibits normal range of motion. Normal sensation noted. Normal strength noted. Hands:    Skin:     General: Skin is warm. Capillary Refill: Capillary refill takes less than 2 seconds. Findings: Laceration (11 cm total of circumferential laceration to index finger on right ) present. Neurological:      Mental Status: He is alert and oriented to person, place, and time. [facility]       Wound Repair  Date/Time: 6/5/2020 10:23 AM  Performed by: attendingPreparation: skin prepped with ChloraPrep  Pre-procedure re-eval: Immediately prior to the procedure, the patient was reevaluated and found suitable for the planned procedure and any planned medications. Time out: Immediately prior to the procedure a time out was called to verify the correct patient, procedure, equipment, staff and marking as appropriate. .  Location details: right index finger  Wound length:7.6 - 12.5 cm  Anesthesia: digital block    Anesthesia:  Local Anesthetic: lidocaine 1% without epinephrine  Anesthetic total: 5 mL  Foreign bodies: no foreign bodies  Irrigation solution: saline  Debridement: none  Wound skin closure material used: 3-0 ethilon. Number of sutures: 6  Technique: simple  Approximation: loose  Dressing: 4x4 and tube gauze  Patient tolerance: Patient tolerated the procedure well with no immediate complications  My total time at bedside, performing this procedure was 1-15 minutes. He had 5 sutures placed PTA.   The remainder of the wound was not intact but was swollen with some undermining of the wound and difficult to align especially without tension thus he was sent to ED. Pt has full ROM of finger. No tendon abnormality noted on exam.  However, he says since 5 sutures placed he cannot bend as much as previously. 2/2 to swelling and amount of tissue lacerated loose sutures applied to help align the remaining tissue. He is advised to follow up with hand/ortho, Dr. Lacey Vickers. Patient's results have been reviewed with them. Patient and/or family have verbally conveyed their understanding and agreement of the patient's signs, symptoms, diagnosis, treatment and prognosis and additionally agree to follow up as recommended or return to the Emergency Room should their condition change prior to follow-up. Discharge instructions have also been provided to the patient with some educational information regarding their diagnosis as well a list of reasons why they would want to return to the ER prior to their follow-up appointment should their condition change.     Lizzette Armstrong MD

## 2020-06-03 NOTE — ED TRIAGE NOTES
Patient fell on a deck, and cut his right second finger. [facility], was given 5 stitches and a tetanus shot and sent to ED for more sutures. Patient with pressure bandage to finger, tip of finger cyanotic in triage, patient reports numbness to finger r/t numbing [facility] at [facility].     Pressure dressing removed in triage, bleeding controlled,  patient with circumferential laceration, gauze and coban applied.

## 2020-06-04 NOTE — DISCHARGE INSTRUCTIONS
Patient Education        Cuts on the Hand Closed With Stitches: Care Instructions  Your Care Instructions     A cut on your hand can be on your fingers, your thumb, or the front or back of your hand. Sometimes a cut can injure the tendons, blood vessels, or nerves of your hand. The doctor used stitches to close the cut. Using stitches also helps the cut heal and reduces scarring. The doctor may have given you a splint to help prevent you from moving your hand, fingers, or thumb. If the cut went deep and through the skin, the doctor put in two layers of stitches. The deeper layer brings the deep part of the cut together. These stitches will dissolve and don't need to be removed. The stitches in the upper layer are the ones you see on the cut. You will probably have a bandage. You will need to have the stitches removed, usually in 7 to 14 days. The doctor may suggest that you see a hand specialist if the cut is very deep or if you have trouble moving your fingers or have less feeling in your hand. The doctor has checked you carefully, but problems can develop later. If you notice any problems or new symptoms, get medical treatment right away. Follow-up care is a key part of your treatment and safety. Be sure to make and go to all appointments, and call your doctor if you are having problems. It's also a good idea to know your test results and keep a list of the medicines you take. How can you care for yourself at home? · Keep the cut dry for the first 24 to 48 hours. After this, you can shower if your doctor okays it. Pat the cut dry. · Don't soak the cut, such as in a bathtub. Your doctor will tell you when it's safe to get the cut wet. · If your doctor told you how to care for your cut, follow your doctor's instructions. If you did not get instructions, follow this general advice:  ? After the first 24 to 48 hours, wash around the cut with clean water 2 times a day.  Don't use hydrogen peroxide or alcohol, which can slow healing. ? You may cover the cut with a thin layer of petroleum jelly, such as Vaseline, and a nonstick bandage. ? Apply more petroleum jelly and replace the bandage as needed. · Prop up the sore hand on a pillow anytime you sit or lie down during the next 3 days. Try to keep it above the level of your heart. This will help reduce swelling. · Avoid any activity that could cause your cut to reopen. · Do not remove the stitches on your own. Your doctor will tell you when to come back to have the stitches removed. · Be safe with medicines. Take pain medicines exactly as directed. ? If the doctor gave you a prescription medicine for pain, take it as prescribed. ? If you are not taking a prescription pain medicine, ask your doctor if you can take an over-the-counter medicine. When should you call for help? Call your doctor now or seek immediate medical care if:  · You have new pain, or your pain gets worse. · The skin near the cut is cold or pale or changes color. · You have tingling, weakness, or numbness near the cut. · The cut starts to bleed, and blood soaks through the bandage. Oozing small amounts of blood is normal.  · You have trouble moving the area of the hand near the cut. · You have symptoms of infection, such as:  ? Increased pain, swelling, warmth, or redness around the cut.  ? Red streaks leading from the cut.  ? Pus draining from the cut.  ? A fever. Watch closely for changes in your health, and be sure to contact your doctor if:  · You do not get better as expected. Where can you learn more? Go to http://www.gray.com/  Enter T250 in the search box to learn more about \"Cuts on the Hand Closed With Stitches: Care Instructions. \"  Current as of: June 26, 2019               Content Version: 12.5  © 2735-9440 [employer].    Care instructions adapted under license by [employer] (which disclaims liability or warranty for this information). If you have questions about a medical condition or this instruction, always ask your healthcare professional. [place] [address] any warranty or liability for your use of this information.

## 2020-06-11 ENCOUNTER — HOSPITAL ENCOUNTER (OUTPATIENT)
Dept: PREADMISSION TESTING | Age: 65
Discharge: HOME | End: 2020-06-11
Payer: COMMERCIAL

## 2020-06-11 PROCEDURE — 87635 SARS-COV-2 COVID-19 AMP PRB: CPT

## 2020-06-12 ENCOUNTER — ANESTHESIA EVENT (OUTPATIENT)
Dept: SURGERY | Age: 65
End: 2020-06-12
Payer: COMMERCIAL

## 2020-06-12 LAB — SARS-COV-2, COV2NT: NOT DETECTED

## 2020-06-15 ENCOUNTER — ANESTHESIA (OUTPATIENT)
Dept: SURGERY | Age: 65
End: 2020-06-15
Payer: COMMERCIAL

## 2020-06-15 ENCOUNTER — HOSPITAL ENCOUNTER (OUTPATIENT)
Age: 65
Setting detail: OUTPATIENT SURGERY
Discharge: HOME | End: 2020-06-15
Attending: ORTHOPAEDIC SURGERY | Admitting: ORTHOPAEDIC SURGERY
Payer: COMMERCIAL

## 2020-06-15 VITALS
HEIGHT: 71 IN | SYSTOLIC BLOOD PRESSURE: 133 MMHG | DIASTOLIC BLOOD PRESSURE: 102 MMHG | OXYGEN SATURATION: 98 % | TEMPERATURE: 98 F | BODY MASS INDEX: 33.4 KG/M2 | RESPIRATION RATE: 30 BRPM | WEIGHT: 238.54 LBS | HEART RATE: 72 BPM

## 2020-06-15 DIAGNOSIS — S61.210A LACERATION OF RIGHT INDEX FINGER WITHOUT FOREIGN BODY WITHOUT DAMAGE TO NAIL, INITIAL ENCOUNTER: Primary | ICD-10-CM

## 2020-06-15 DIAGNOSIS — S64.490A INJURY OF DIGITAL NERVE OF RIGHT INDEX FINGER, INITIAL ENCOUNTER: ICD-10-CM

## 2020-06-15 LAB
GLUCOSE BLD STRIP.AUTO-MCNC: 141 MG/DL (ref 65–100)
SERVICE CMNT-IMP: ABNORMAL

## 2020-06-15 PROCEDURE — A4565 SLINGS: HCPCS | Performed by: ORTHOPAEDIC SURGERY

## 2020-06-15 PROCEDURE — 76060000032 HC ANESTHESIA 0.5 TO 1 HR: Performed by: ORTHOPAEDIC SURGERY

## 2020-06-15 PROCEDURE — 76210000026 HC REC RM PH II 1 TO 1.5 HR: Performed by: ORTHOPAEDIC SURGERY

## 2020-06-15 PROCEDURE — 82962 GLUCOSE BLOOD TEST: CPT

## 2020-06-15 PROCEDURE — 77030018836 HC SOL IRR NACL ICUM -A: Performed by: ORTHOPAEDIC SURGERY

## 2020-06-15 PROCEDURE — 77030040922 HC BLNKT HYPOTHRM STRY -A

## 2020-06-15 PROCEDURE — 77030040356 HC CORD BPLR FRCP COVD -A: Performed by: ORTHOPAEDIC SURGERY

## 2020-06-15 PROCEDURE — 77030000032 HC CUF TRNQT ZIMM -B: Performed by: ORTHOPAEDIC SURGERY

## 2020-06-15 PROCEDURE — 74011250636 HC RX REV CODE- 250/636: Performed by: ANESTHESIOLOGY

## 2020-06-15 PROCEDURE — 77030011640 HC PAD GRND REM COVD -A: Performed by: ORTHOPAEDIC SURGERY

## 2020-06-15 PROCEDURE — 77030002888 HC SUT CHRMC J&J -A: Performed by: ORTHOPAEDIC SURGERY

## 2020-06-15 PROCEDURE — 77030040361 HC SLV COMPR DVT MDII -B

## 2020-06-15 PROCEDURE — 76010000138 HC OR TIME 0.5 TO 1 HR: Performed by: ORTHOPAEDIC SURGERY

## 2020-06-15 PROCEDURE — 74011000250 HC RX REV CODE- 250: Performed by: ORTHOPAEDIC SURGERY

## 2020-06-15 PROCEDURE — 74011250636 HC RX REV CODE- 250/636: Performed by: STUDENT IN AN ORGANIZED HEALTH CARE EDUCATION/TRAINING PROGRAM

## 2020-06-15 RX ORDER — TRAMADOL HYDROCHLORIDE 50 MG/1
50 TABLET ORAL
Qty: 10 TAB | Refills: 0 | Status: SHIPPED
Start: 2020-06-15 | End: 2020-06-18

## 2020-06-15 RX ORDER — HYDROMORPHONE HYDROCHLORIDE 1 MG/ML
.25-1 INJECTION, SOLUTION INTRAMUSCULAR; INTRAVENOUS; SUBCUTANEOUS
Status: DISCONTINUED | OUTPATIENT
Start: 2020-06-15 | End: 2020-06-15 | Stop reason: HOSPADM

## 2020-06-15 RX ORDER — LIDOCAINE HYDROCHLORIDE 10 MG/ML
0.1 INJECTION, SOLUTION EPIDURAL; INFILTRATION; INTRACAUDAL; PERINEURAL AS NEEDED
Status: DISCONTINUED | OUTPATIENT
Start: 2020-06-15 | End: 2020-06-15 | Stop reason: HOSPADM

## 2020-06-15 RX ORDER — SODIUM CHLORIDE, SODIUM LACTATE, POTASSIUM CHLORIDE, CALCIUM CHLORIDE 600; 310; 30; 20 MG/100ML; MG/100ML; MG/100ML; MG/100ML
100 INJECTION, SOLUTION INTRAVENOUS CONTINUOUS
Status: DISCONTINUED | OUTPATIENT
Start: 2020-06-15 | End: 2020-06-15 | Stop reason: HOSPADM

## 2020-06-15 RX ORDER — ONDANSETRON 8 MG/1
8 TABLET, ORALLY DISINTEGRATING ORAL
Qty: 10 TAB | Refills: 2 | Status: SHIPPED
Start: 2020-06-15 | End: 2021-04-21

## 2020-06-15 RX ORDER — PROPOFOL 10 MG/ML
INJECTION, EMULSION INTRAVENOUS
Status: DISCONTINUED | OUTPATIENT
Start: 2020-06-15 | End: 2020-06-15 | Stop reason: HOSPADM

## 2020-06-15 RX ORDER — MIDAZOLAM HYDROCHLORIDE 1 MG/ML
INJECTION, SOLUTION INTRAMUSCULAR; INTRAVENOUS AS NEEDED
Status: DISCONTINUED | OUTPATIENT
Start: 2020-06-15 | End: 2020-06-15 | Stop reason: HOSPADM

## 2020-06-15 RX ADMIN — PROPOFOL 60 MCG/KG/MIN: 10 INJECTION, EMULSION INTRAVENOUS at 16:58

## 2020-06-15 RX ADMIN — SODIUM CHLORIDE, SODIUM LACTATE, POTASSIUM CHLORIDE, AND CALCIUM CHLORIDE 100 ML/HR: 600; 310; 30; 20 INJECTION, SOLUTION INTRAVENOUS at 14:52

## 2020-06-15 RX ADMIN — MIDAZOLAM HYDROCHLORIDE 2 MG: 2 INJECTION, SOLUTION INTRAMUSCULAR; INTRAVENOUS at 16:59

## 2020-06-15 RX ADMIN — SODIUM CHLORIDE, SODIUM LACTATE, POTASSIUM CHLORIDE, AND CALCIUM CHLORIDE: 600; 310; 30; 20 INJECTION, SOLUTION INTRAVENOUS at 16:50

## 2020-06-15 NOTE — BRIEF OP NOTE
Brief Postoperative Note    Patient: Margurette Apley  YOB: 1955  MRN: [id number]    Date of Procedure: 6/15/2020     Pre-Op Diagnosis: LACERATION RIGHT INDEX FINGER    Post-Op Diagnosis: Same as preoperative diagnosis.       Procedure(s):  RIGHT INDEX FINGER WOUND EXPLORATION    Surgeon(s):  Suyapa Haji MD    Surgical Assistant: Physician Assistant: Juvencio Crandall PA-C    Anesthesia: MAC     Estimated Blood Loss (mL): Minimal    Complications: None    Specimens: * No specimens in log *     Implants: * No implants in log *    Drains: * No LDAs found *    Findings: contused ulnar digital nerve    Electronically Signed by Edward King MD on 6/15/2020 at 5:27 PM

## 2020-06-15 NOTE — ANESTHESIA PREPROCEDURE EVALUATION
Relevant Problems   No relevant active problems       Anesthetic History   No history of anesthetic complications            Review of Systems / Medical History  Patient summary reviewed and pertinent labs reviewed    Pulmonary            Asthma : well controlled       Neuro/Psych   Within defined limits           Cardiovascular                  Exercise tolerance: >4 METS     GI/Hepatic/Renal             Pertinent negatives: No GERD   Endo/Other    Diabetes: well controlled, type 2         Other Findings              Physical Exam    Airway  Mallampati: II  TM Distance: 4 - 6 cm  Neck ROM: normal range of motion   Mouth opening: Normal     Cardiovascular    Rhythm: regular  Rate: normal         Dental  No notable dental hx       Pulmonary  Breath sounds clear to auscultation               Abdominal  GI exam deferred       Other Findings            Anesthetic Plan    ASA: 2  Anesthesia type: MAC          Induction: Intravenous  Anesthetic plan and risks discussed with: Patient

## 2020-06-15 NOTE — DISCHARGE INSTRUCTIONS
Upper Extremity Surgery Discharge Instructions  Dr. Gurdeep Amaral       Please take the time to review the following instructions before you leave the surgery center and use them as guidelines during your recovery from surgery. If you have any questions, you may contact Dr. Charley Benitez office at [phone]. Your follow-up appointment will be at Dr. Charley Benitez office at [address] [facility], [facility] [address]. Your post-operative appointment is usually scheduled on the day you scheduled surgery in the office. If you are not sure of the date and time of this appointment, please check MySQUAR (you may visit [email or web address] or call [phone] for information to access MySQUAR) or you may call the office to clarify. Would Care / Dressing Change     Do NOT remove your dressing or get them wet. Antonio Mattson / Bathing     May bathe/shower as long as dressing/splint/cast is kept dry. Sling (if applicable)     If you were given a sling, you may remove your sling once the block wears off, which may be anywhere from 8-48 hrs after surgery. Thereafter, you are not required to wear a sling and should do so only as needed for comfort. Activity      No lifting with your affected hand. Otherwise, you may proceed with activity as tolerated. No driving until further notice unless otherwise instructed by Dr. Christine Salgado. Diet     You may advance your regular diet as tolerated. Increase your clear liquid intake for the next 2-3 days. Ice and Elevation     Keep your hand elevated continuously for 48 hours after surgery. Your hand/wrist should always be above the level of your heart. Sleep with your arm elevated on several pillows or in the \"cheese block pillow\" provided at the time of surgery to minimize swelling and discomfort. Continue ice consistently for 48 hours after surgery.  After 48 hours, you should ice 3 times per day for 20 minutes at a time for the next 5 days. After 1 week from surgery, you may use ice as needed for pain. Medications     Most patients will be prescribed medications after surgery. Please use the medications as prescribed. Pain medications may cause constipation - over the counter Colace or Milk of Magnesia may be used as needed. Other possible side effects of pain medications are dizziness, headache, nausea, vomiting, and urinary retention. Discontinue the pain medication if you develop itching, rash, shortness of breath, or difficulties swallowing. If these symptoms become severe or arent relieved by discontinuing the medication, you should seek immediate medical attention. Prescribed nausea and/or stool softening medications may be filled as needed but are often helpful when used with pain medication and after anesthesia. Refills of pain medication are authorized during office hours only (8AM - 5PM Monday through Friday)     Do not take Tylenol/Acetaminophen in addition to your pain medication if your pain medication contains this (eg. Percocet or Oxycodone/Acetaminophen; Norco or Hydrocodone/Acetaminophen). Do not exceed 4000mg of Tylenol/Acetaminophen per day. You may resume the medication you were taking prior to your surgery. Pain medication may change the effects of any antidepressant medication you may be taking. If you have any questions about possible interactions between your regular medication and the pain medication, you should consult the physician who prescribes your regular medications. Do not drive while taking narcotic pain medication. Please call the office at [phone] if you have any increasing numbness or tingling, increasing drainage on your dressing, fever greater than 100.5 degrees F, or pain not controlled by medications. If you are experiencing chest pain or shortness of breath, please alert your primary care physician immediately.        DISCHARGE SUMMARY from your Nurse      PATIENT INSTRUCTIONS    After general anesthesia or intravenous sedation, for 24 hours or while taking prescription Narcotics:  · Limit your activities  · Do not drive and operate hazardous machinery  · Do not make important personal or business decisions  · Do  not drink alcoholic beverages  · If you have not urinated within 8 hours after discharge, please contact your surgeon on call. Report the following to your surgeon:  · Excessive pain, swelling, redness or odor of or around the surgical area  · Temperature over 100.5  · Nausea and vomiting lasting longer than 4 hours or if unable to take medications  · Any signs of decreased circulation or nerve impairment to extremity: change in color, persistent  numbness, tingling, coldness or increase pain  · Any questions      GOOD HELP TO FIGHT AN INFECTION  Here are a few tip to help reduce the chance of getting an infection after surgery:   Wash Your Hands   Good handwashing is the most important thing you and your caregiver can do.  Wash before and after caring for any wounds. Dry your hand with a clean towel.  Wash with soap and water for at least 20 seconds. A TIP: sing the \"Happy Birthday\" song through one time while washing to help with the timing.  Use a hand  in between washings.  Shower   When your surgeon says it is OK to take a shower, use a new bar of antibacterial soap (if that is what you use, and keep that bar of soap ONLY for your use), or antibacterial body wash.  Use a clean wash cloth or sponge when you bathe.  Dry off with a clean towel  after every bath - be careful around any wounds, skin staples, sutures or surgical glue over/on wounds.  Do not enter swimming pools, hot tubs, lakes, rivers and/or ocean until wounds are healed and your doctor/surgeon says it is OK.  Use Clean Sheets   Sleep on freshly laundered sheets after your surgery.    Keep the surgery site covered with a clean, dry bandage (if instructed to do so). If the bandage becomes soiled, reapply a new, dry, clean bandage.  Do not allow pets to sleep with you while your wound is healing.  Lifestyle Modification and Controlling Your Blood Sugar   Smoking slows wound healing. Stop smoking and limit exposure to second-hand smoke.  High blood sugar slows wound healing. Eat a well-balanced diet to provide proper nutrition while healing   Monitor your blood sugar (if you are a diabetic) and take your medications as you are suppose to so you can control you blood sugar after surgery. COUGH AND DEEP BREATHE    Breathing deeply and coughing are very important exercises to do after surgery. Deep breathing and coughing open the little air tubes and air sacks in your lungs. You take deep breaths every day. You may not even notice - it is just something you do when you sigh or yawn. It is a natural exercise you do to keep these air passages open. After surgery, take deep breaths and cough, on purpose. DIRECTIONS:  · Take 10 to 15 slow deep breaths every hour while awake. · Breathe in deeply, and hold it for 2 seconds. · Exhale slowly through puckered lips, like blowing up a balloon. · After every 4th or 5th deep breath, hug your pillow to your chest or belly and give a hard, deep cough. Yes, it will probably hurt. But doing this exercise is a very important part of healing after surgery. Take your pain medicine to help you do this exercise without too much pain. Coughing and deep breathing help prevent bronchitis and pneumonia after surgery. If you had chest or belly surgery, use a pillow as a \"hug geovanna\" and hold it tightly to your chest or belly when you cough. ANKLE PUMPS    Ankle pumps increase the circulation of oxygenated blood to your lower extremities and decrease your risk for circulation problems such as blood clots.  They also stretch the muscles, tendons and ligaments in your foot and ankle, and prevent joint contracture in the ankle and foot, especially after surgeries on the legs. It is important to do ankle pump exercises regularly after surgery because immobility increases your risk for developing a blood clot. Your doctor may also have you take an Aspirin for the next few days as well. If your doctor did not ask you to take an Aspirin, consult with him before starting Aspirin therapy on your own. The exercise is quite simple. · Slowly point your foot forward, feeling the muscles on the top of your lower leg stretch, and hold this position for 5 seconds. · Next, pull your foot back toward you as far as possible, stretching the calf muscles, and hold that position for 5 seconds. · Repeat with the other foot. · Perform 10 repetitions every hour while awake for both ankles if possible (down and then up with the foot once is one repetition). You should feel gentle stretching of the muscles in your lower leg when doing this exercise. If you feel pain, or your range of motion is limited, don't push too hard. Only go the limit your joint and muscles will let you go. If you have increasing pain, progressively worsening leg warmth or swelling, STOP the exercise and call your doctor. MEDICATION AND   SIDE EFFECT GUIDE    The [facility] MEDICATION AND SIDE EFFECT GUIDE was provided to the PATIENT AND CARE PROVIDER. Information provided includes instruction about drug purpose and common side effects for the following medications:   · Colace  · Zofran  · Tramadol        These are general instructions for a healthy lifestyle:    *   Please give a list of your current medications to your Primary Care Provider. *   Please update this list whenever your medications are discontinued, doses are changed, or new medications (including over-the-counter products) are added.   *   Please carry medication information at all times in case of emergency situations. About Smoking  No smoking / No tobacco products  Avoid exposure to second hand smoke     Surgeon General's Warning:  Quitting smoking now greatly reduces serious risk to your health. Obesity, smoking, and sedentary lifestyle greatly increases your risk for illness and disease. A healthy diet, regular physical exercise & weight monitoring are important for maintaining a healthy lifestyle. Congestive Heart Failure  You may be retaining fluid if you have a history of heart failure or if you experience any of the following symptoms:  Weight gain of 3 pounds or more overnight or 5 pounds in a week, increased swelling in your hands or feet or shortness of breath while lying flat in bed. Please call your doctor as soon as you notice any of these symptoms; do not wait until your next office visit. Recognize signs and symptoms of STROKE:  F -  Face looks uneven  A -  Arms unable to move or move evenly  S -  Speech slurred or non-existent  T -  Time-call 911 as soon as signs and symptoms begin-DO NOT go          back to bed or wait to see if you get better-TIME IS BRAIN. Warning Signs of HEART ATTACK   Call 911 if you have these symptoms:     Chest discomfort. Most heart attacks involve discomfort in the center of the chest that lasts more than a few minutes, or that goes away and comes back. It can feel like uncomfortable pressure, squeezing, fullness, or pain.  Discomfort in other areas of the upper body. Symptoms can include pain or discomfort in one or both arms, the back, neck, jaw, or stomach.  Shortness of breath with or without chest discomfort.  Other signs may include breaking out in a cold sweat, nausea, or lightheadedness. Don't wait more than five minutes to call 911 - MINUTES MATTER! Fast action can save your life. Calling 911 is almost always the fastest way to get lifesaving treatment.  Emergency Medical Services staff can begin treatment when they arrive -- up to an hour sooner than if someone gets to the hospital by car. Learning About Coronavirus [phone])  Coronavirus [phone]): Overview  What is coronavirus (COVID-19)? The coronavirus disease (COVID-19) is caused by a virus. It is an illness that was first found in [place], [place], in December 2019. It has since spread worldwide. The virus can cause fever, cough, and trouble breathing. In severe cases, it can cause pneumonia and make it hard to breathe without help. It can cause death. Coronaviruses are a large group of viruses. They cause the common cold. They also cause more serious illnesses like Middle East respiratory syndrome (MERS) and severe acute respiratory syndrome (SARS). COVID-19 is caused by a novel coronavirus. That means it's a new type that has not been seen in people before. This virus spreads person-to-person through droplets from coughing and sneezing. It can also spread when you are close to someone who is infected. And it can spread when you touch something that has the virus on it, such as a doorknob or a tabletop. What can you do to protect yourself from coronavirus (COVID-19)? The best way to protect yourself from getting sick is to:  · Avoid areas where there is an outbreak. · Avoid contact with people who may be infected. · Wash your hands often with soap or alcohol-based hand sanitizers. · Avoid crowds and try to stay at least 6 feet away from other people. · Wash your hands often, especially after you cough or sneeze. Use soap and water, and scrub for at least 20 seconds. If soap and water aren't available, use an alcohol-based hand . · Avoid touching your mouth, nose, and eyes. What can you do to avoid spreading the virus to others? To help avoid spreading the virus to others:  · Cover your mouth with a tissue when you cough or sneeze. Then throw the tissue in the trash. · Use a disinfectant to clean things that you touch often.   · Stay home if you are sick or have been exposed to the virus. Don't go to school, work, or public areas. And don't use public transportation. · If you are sick:  ? Leave your home only if you need to get medical care. But call the doctor's office first so they know you're coming. And wear a face mask, if you have one.  ? If you have a face mask, wear it whenever you're around other people. It can help stop the spread of the virus when you cough or sneeze. ? Clean and disinfect your home every day. Use household  and disinfectant wipes or sprays. Take special care to clean things that you grab with your hands. These include doorknobs, remote controls, phones, and handles on your refrigerator and microwave. And don't forget countertops, tabletops, bathrooms, and computer keyboards. When to call for help  Call 911 anytime you think you may need emergency care. For example, call if:  · You have severe trouble breathing. (You can't talk at all.)  · You have constant chest pain or pressure. · You are severely dizzy or lightheaded. · You are confused or can't think clearly. · Your face and lips have a blue color. · You pass out (lose consciousness) or are very hard to wake up. Call your doctor now if you develop symptoms such as:  · Shortness of breath. · Fever. · Cough. If you need to get care, call ahead to the doctor's office for instructions before you go. Make sure you wear a face mask, if you have one, to prevent exposing other people to the virus. Where can you get the latest information? The following health organizations are tracking and studying this virus. Their websites contain the most up-to-date information. Mary Martínez also learn what to do if you think you may have been exposed to the virus. · U.S. Centers for Disease Control and Prevention (CDC): The CDC provides updated news about the disease and travel advice. The website also tells you how to prevent the spread of infection.  www.cdc.gov  · [address] Organization (WHO): WHO offers information about the virus outbreaks. WHO also has travel advice. www.who.int  Current as of: April 1, 2020               Content Version: 12.4  © 2006-2020 [employer] Inc[employer]. Care instructions adapted under license by your healthcare professional. If you have questions about a medical condition or this instruction, always ask your healthcare professional. [address] any warranty or liability for your use of this information. The discharge information has been reviewed with the {PATIENT PARENT GUARDIAN:[id number]}. Any questions and concerns from the {PATIENT PARENT GUARDIAN:67900} have been addressed. The {PATIENT PARENT GUARDIAN:61745} verbalized understanding. Other information in your discharge envelope:  [x]     PRESCRIPTIONS      The following personal items collected during your admission are returned to you:   Dental Appliance: Dental Appliances: None  Vision:    Hearing Aid:    Jewelry: Jewelry: Ring(to safe)  Clothing: Clothing: Other (comment)(street clothes to locker)  Other Valuables:  Other Valuables: Cell Phone, Jonathan Segundo, Other (comment)(and ring all to safe)  Valuables sent to safe: Personal Items Sent to Safe: one ring, cell phone, keys, and glasses

## 2020-06-15 NOTE — ANESTHESIA POSTPROCEDURE EVALUATION
Procedure(s):  RIGHT INDEX FINGER WOUND EXPLORATION. MAC    Anesthesia Post Evaluation      Multimodal analgesia: multimodal analgesia not used between 6 hours prior to anesthesia start to PACU discharge  Patient location during evaluation: PACU  Patient participation: complete - patient participated  Level of consciousness: awake and alert  Pain score: 0  Pain management: satisfactory to patient  Airway patency: patent  Anesthetic complications: no  Cardiovascular status: acceptable  Respiratory status: acceptable  Hydration status: acceptable  Post anesthesia nausea and vomiting:  none  Final Post Anesthesia Temperature Assessment:  Normothermia (36.0-37.5 degrees C)      INITIAL Post-op Vital signs:   Vitals Value Taken Time   /102 6/15/2020  6:10 PM   Temp 36.7 °C (98 °F) 6/15/2020  6:10 PM   Pulse 74 6/15/2020  6:14 PM   Resp 17 6/15/2020  6:14 PM   SpO2 99 % 6/15/2020  6:14 PM   Vitals shown include unvalidated device data.

## 2020-06-15 NOTE — H&P
Orthopedic Admission History and Physical        NAME: Estelle Schaumann       :  1955       MRN:  [id number]      Subjective:     Patient is a 71 y.o. male who presents with history of laceration of right index finger without foreign body without damage to nail and   Injury of digital nerve of finger. Presents today for surgical treatment. Patient Active Problem List    Diagnosis Date Noted    Borderline hypertension 2020    Lymphadenopathy 2016    Dyslipidemia (high LDL; low HDL) 2014    Asthma 2014    Arthralgia of knee 07/15/2013    [facility] [address] 2013    Diverticular disease of left colon 2013    BPH (benign prostatic hyperplasia) 2013    ED (erectile dysfunction) 2011    Gout 1985     Past Medical History:   Diagnosis Date    Arthralgia of knee 7/15/2013    Diabetes ([facility] [address]     type 2    Diverticular disease of left colon 3/22/2013    GERD (gastroesophageal reflux disease)     Gout 1985      Past Surgical History:   Procedure Laterality Date    HX COLONOSCOPY      Dr. Bushra Trinidad    HX HEENT      deviated septum age 12    HX ORTHOPAEDIC  1974    right knee    HX OTHER SURGICAL  15    excision of lymph node right axilla    HX TONSILLECTOMY      child      Prior to Admission medications    Medication Sig Start Date End Date Taking? Authorizing Provider   metFORMIN ER (GLUCOPHAGE XR) 500 mg tablet TAKE 3 TABLETS BY MOUTH EVERY DAY WITH DINNER 20   Rigo Jefferson MD   atorvastatin (LIPITOR) 10 mg tablet TAKE 1 TABLET BY MOUTH EVERY DAY 20   Rigo Jefferson MD   doxycycline (VIBRAMYCIN) 100 mg capsule TAKE 1 CAPSULE BY MOUTH EVERY DAY 19   Rigo Jefferson MD   clindamycin (CLEOCIN) 300 mg capsule Take 1 Cap by mouth three (3) times daily. Indications: infection within the abdomen 19   Rigo Jefferson MD   metroNIDAZOLE (METROGEL) 1 % topical gel Apply  to affected area daily.  Use a thin layer to affected areas after washing  Patient taking differently: Apply  to affected area as needed. Use a thin layer to affected areas after washing 12/2/15   Dian Broderick MD   aspirin delayed-release 81 mg tablet Take 81 mg by mouth nightly. 6/2/15   Dian Broderick MD   ibuprofen (MOTRIN) 200 mg tablet Take 800 mg by mouth as needed. 11/18/13   Dian Broderick MD     No current facility-administered medications for this encounter. Current Outpatient Medications   Medication Sig    metFORMIN ER (GLUCOPHAGE XR) 500 mg tablet TAKE 3 TABLETS BY MOUTH EVERY DAY WITH DINNER    atorvastatin (LIPITOR) 10 mg tablet TAKE 1 TABLET BY MOUTH EVERY DAY    doxycycline (VIBRAMYCIN) 100 mg capsule TAKE 1 CAPSULE BY MOUTH EVERY DAY    clindamycin (CLEOCIN) 300 mg capsule Take 1 Cap by mouth three (3) times daily. Indications: infection within the abdomen    metroNIDAZOLE (METROGEL) 1 % topical gel Apply  to affected area daily. Use a thin layer to affected areas after washing (Patient taking differently: Apply  to affected area as needed. Use a thin layer to affected areas after washing)    aspirin delayed-release 81 mg tablet Take 81 mg by mouth nightly.  ibuprofen (MOTRIN) 200 mg tablet Take 800 mg by mouth as needed. No Known Allergies   Social History     Tobacco Use    Smoking status: Never Smoker    Smokeless tobacco: Never Used   Substance Use Topics    Alcohol use: Yes     Comment: 1 beer per month      Family History   Problem Relation Age of Onset    Mental Retardation Mother     Cancer Father         lung    Diabetes Maternal Grandmother     Diabetes Paternal Grandmother         Review of Systems  A comprehensive review of systems was negative except for that written in the HPI. Objective:     No data found. No data recorded. Physical Exam:  General appearance: alert, cooperative, no distress, appears stated age  Lungs: No use of accessory breathing muscles. Breathing unlabored. Cardiac: Regular rate. Abdomen: soft, non-tender, non-distended  Extremities: As per prior exam.     Labs: No results found for this or any previous visit (from the past 24 hour(s)). Assessment:   No medical contraindications to proceeding with planned surgery. Please see initial office note for full discussion of risks, benefits, and alternatives to surgery. Patient Active Problem List    Diagnosis Date Noted    Borderline hypertension 04/01/2020    Lymphadenopathy 01/21/2016    Dyslipidemia (high LDL; low HDL) 12/03/2014    Asthma 07/31/2014    Arthralgia of knee 07/15/2013    Diabetes mellitus ([facility] [address] 04/23/2013    Diverticular disease of left colon 03/22/2013    BPH (benign prostatic hyperplasia) 03/22/2013    ED (erectile dysfunction) 03/22/2011    Gout 12/06/1985         Plan:   Proceed with surgery  Pt. stable  Pt.  NPO x meds

## 2020-06-16 NOTE — OP NOTES
[facility]  OPERATIVE REPORT    Name:  Damien Cervantes  MR#:  [id number]  :  1955  ACCOUNT #:  [de-identified]  DATE OF SERVICE:  06/15/2020    PREOPERATIVE DIAGNOSIS:  Right index finger laceration with ulnar digital nerve injury. POSTOPERATIVE DIAGNOSIS:  Right index finger laceration with ulnar digital nerve injury. PROCEDURE PERFORMED:  Right index finger wound exploration, penetrating wound. SURGEON:  Ann Marie Sky MD    ASSISTANT:  Concetta Hammond. ANESTHESIA:  MAC.    COMPLICATIONS:  None. SPECIMENS REMOVED:  None. IMPLANTS:  None. ESTIMATED BLOOD LOSS:  Minimal.    DRAINS:  None. FINDINGS:  Contused ulnar digital nerve without laceration. There was also some disruption of the cruciate pulley though the A2 pulley was essentially entirely intact. INDICATIONS FOR PROCEDURE:  The patient is a pleasant 79-year-old gentleman who sustained a laceration to his finger. He had numbness in the ulnar aspect of the index finger after this injury as well as some difficulty flexing the finger. We discussed wound exploration to explore whether a formal repair is necessary to the ulnar digital nerve. He signed informed consent understanding the risk of bleeding, infection, damage to surrounding nerves and blood vessels, persistent pain, stiffness, loss of function, and failure to heal.    DESCRIPTION OF PROCEDURE:  The patient was seen and identified in the preanesthesia care unit. The operative site was marked. Preoperative questions were invited and answered. He was evaluated by the Anesthesia team and brought to the operative suite on a stretcher. He was transferred to the operating room table. He was prepped and draped in the usual fashion. A timeout was undertaken confirming the appropriate site, side, and procedure. Arm was exsanguinated using an Esmarch bandage and a well-padded tourniquet was inflated to 250 mmHg.   He received Ancef prior to proceeding. Next, the patient's volar sutures were removed and full-thickness skin flaps were elevated, exposing the underlying structures. There was a contused nerve and injury to the pulley system but not to the underlying nerves as above. There was damage to the digital artery with segmental loss but as the fingers were well-perfused, no repair was required. Tourniquet was let down. Wound was irrigated. Hemostasis was obtained with bipolar cautery. Skin was closed with nylon volarly as well chromic in the webspace. He was then brought to the PACU uneventfully. POSTOPERATIVE PLAN:  Return to clinic in two weeks for wound check.       Niesha Molina MD      [id number]/  D:  06/15/2020 17:32  T:  06/16/2020 3:36  JOB #:  [id number]

## 2020-07-15 ENCOUNTER — OFFICE VISIT (OUTPATIENT)
Dept: INTERNAL MEDICINE CLINIC | Age: 65
End: 2020-07-15

## 2020-07-15 ENCOUNTER — HOSPITAL ENCOUNTER (EMERGENCY)
Age: 65
Discharge: HOME | End: 2020-07-15
Attending: STUDENT IN AN ORGANIZED HEALTH CARE EDUCATION/TRAINING PROGRAM | Admitting: STUDENT IN AN ORGANIZED HEALTH CARE EDUCATION/TRAINING PROGRAM
Payer: COMMERCIAL

## 2020-07-15 ENCOUNTER — APPOINTMENT (OUTPATIENT)
Dept: CT IMAGING | Age: 65
End: 2020-07-15
Attending: EMERGENCY MEDICINE
Payer: COMMERCIAL

## 2020-07-15 VITALS
BODY MASS INDEX: 32.2 KG/M2 | SYSTOLIC BLOOD PRESSURE: 147 MMHG | WEIGHT: 230 LBS | TEMPERATURE: 98.6 F | HEIGHT: 71 IN | DIASTOLIC BLOOD PRESSURE: 93 MMHG | OXYGEN SATURATION: 93 % | HEART RATE: 117 BPM | RESPIRATION RATE: 22 BRPM

## 2020-07-15 VITALS — BODY MASS INDEX: 32.54 KG/M2 | WEIGHT: 230 LBS

## 2020-07-15 DIAGNOSIS — J98.11 BILATERAL ATELECTASIS: Primary | ICD-10-CM
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
## 2020-07-15 DIAGNOSIS — Z20.822 SUSPECTED COVID-19 VIRUS INFECTION: ICD-10-CM

## 2020-07-15 DIAGNOSIS — R00.0 SINUS TACHYCARDIA: ICD-10-CM

## 2020-07-15 DIAGNOSIS — R06.00 DYSPNEA AND RESPIRATORY ABNORMALITIES: ICD-10-CM

## 2020-07-15 DIAGNOSIS — R06.89 DYSPNEA AND RESPIRATORY ABNORMALITIES: ICD-10-CM

## 2020-07-15 DIAGNOSIS — R07.81 PLEURITIC PAIN: Primary | ICD-10-CM

## 2020-07-15 DIAGNOSIS — J90 PLEURAL EFFUSION: ICD-10-CM

## 2020-07-15 LAB
ALBUMIN SERPL-MCNC: 4 G/DL (ref 3.5–5)
ALBUMIN/GLOB SERPL: 1 {RATIO} (ref 1.1–2.2)
ALP SERPL-CCNC: 67 U/L (ref 45–117)
ALT SERPL-CCNC: 31 U/L (ref 12–78)
ANION GAP SERPL CALC-SCNC: 6 MMOL/L (ref 5–15)
AST SERPL-CCNC: 23 U/L (ref 15–37)
BASOPHILS # BLD: 0 K/UL (ref 0–0.1)
BASOPHILS NFR BLD: 0 % (ref 0–1)
BILIRUB SERPL-MCNC: 1.3 MG/DL (ref 0.2–1)
BUN SERPL-MCNC: 9 MG/DL (ref 6–20)
BUN/CREAT SERPL: 9 (ref 12–20)
CALCIUM SERPL-MCNC: 9 MG/DL (ref 8.5–10.1)
CHLORIDE SERPL-SCNC: 102 MMOL/L (ref 97–108)
CO2 SERPL-SCNC: 27 MMOL/L (ref 21–32)
COMMENT, HOLDF: NORMAL
CREAT SERPL-MCNC: 1.02 MG/DL (ref 0.7–1.3)
DIFFERENTIAL METHOD BLD: ABNORMAL
EOSINOPHIL # BLD: 0.2 K/UL (ref 0–0.4)
EOSINOPHIL NFR BLD: 3 % (ref 0–7)
ERYTHROCYTE [DISTWIDTH] IN BLOOD BY AUTOMATED COUNT: 13.7 % (ref 11.5–14.5)
GLOBULIN SER CALC-MCNC: 4.1 G/DL (ref 2–4)
GLUCOSE SERPL-MCNC: 170 MG/DL (ref 65–100)
HCT VFR BLD AUTO: 40.8 % (ref 36.6–50.3)
HGB BLD-MCNC: 14.4 G/DL (ref 12.1–17)
IMM GRANULOCYTES # BLD AUTO: 0 K/UL (ref 0–0.04)
IMM GRANULOCYTES NFR BLD AUTO: 0 % (ref 0–0.5)
LYMPHOCYTES # BLD: 0.9 K/UL (ref 0.8–3.5)
LYMPHOCYTES NFR BLD: 11 % (ref 12–49)
MCH RBC QN AUTO: 31 PG (ref 26–34)
MCHC RBC AUTO-ENTMCNC: 35.3 G/DL (ref 30–36.5)
MCV RBC AUTO: 87.7 FL (ref 80–99)
MONOCYTES # BLD: 0.7 K/UL (ref 0–1)
MONOCYTES NFR BLD: 8 % (ref 5–13)
NEUTS SEG # BLD: 6.2 K/UL (ref 1.8–8)
NEUTS SEG NFR BLD: 78 % (ref 32–75)
NRBC # BLD: 0 K/UL (ref 0–0.01)
NRBC BLD-RTO: 0 PER 100 WBC
PLATELET # BLD AUTO: 147 K/UL (ref 150–400)
PMV BLD AUTO: 10.4 FL (ref 8.9–12.9)
POTASSIUM SERPL-SCNC: 3.4 MMOL/L (ref 3.5–5.1)
PROT SERPL-MCNC: 8.1 G/DL (ref 6.4–8.2)
RBC # BLD AUTO: 4.65 M/UL (ref 4.1–5.7)
SAMPLES BEING HELD,HOLD: NORMAL
SODIUM SERPL-SCNC: 135 MMOL/L (ref 136–145)
TROPONIN I SERPL-MCNC: <0.05 NG/ML
WBC # BLD AUTO: 7.9 K/UL (ref 4.1–11.1)

## 2020-07-15 PROCEDURE — 77030027138 HC INCENT SPIROMETER -A

## 2020-07-15 PROCEDURE — 84484 ASSAY OF TROPONIN QUANT: CPT

## 2020-07-15 PROCEDURE — 99285 EMERGENCY DEPT VISIT HI MDM: CPT

## 2020-07-15 PROCEDURE — 80053 COMPREHEN METABOLIC PANEL: CPT

## 2020-07-15 PROCEDURE — 71275 CT ANGIOGRAPHY CHEST: CPT

## 2020-07-15 PROCEDURE — 74011636320 HC RX REV CODE- 636/320: Performed by: RADIOLOGY

## 2020-07-15 PROCEDURE — 85025 COMPLETE CBC W/AUTO DIFF WBC: CPT

## 2020-07-15 PROCEDURE — 36415 COLL VENOUS BLD VENIPUNCTURE: CPT

## 2020-07-15 PROCEDURE — 87635 SARS-COV-2 COVID-19 AMP PRB: CPT

## 2020-07-15 PROCEDURE — 93005 ELECTROCARDIOGRAM TRACING: CPT

## 2020-07-15 RX ADMIN — IOPAMIDOL 80 ML: 755 INJECTION, SOLUTION INTRAVENOUS at 18:38

## 2020-07-15 NOTE — PROGRESS NOTES
Subjective: This is a 25-year-old white male. The visit was using [place] [employer]. About three weeks ago he had an injury and some surgery on a finger. 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 He said he did not really injure his ribcage. He has noticed some increased joint pain, mostly in the thumbs, but for a week he has had severe pain in his midthoracic area, radiating around to the right, with shortness of breath and difficulty taking a deep breath. Today and yesterday the pain has been more severe. worsew lying flat (skipping). He does not remember injuring his ribs at the time of the accident. He has no history of DVT, pulmonary embolism. His wife is on the phone. He also had a tick bite and he has a red salome on the back of his right knee in the popliteal fossa, but that does not look particularly bad, it is raised and itchy. Plan:  My advice was for him to go to the ER to get evaluated. I think we need to make sure he is not having pulmonary embolism or did not fracture ribs, so I have sent him to the [facility] emergency room to get some x-rays and to be evaluated. I told him we would just keep an eye on the tick bite, I do not think that is any serious situation. 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    Borderline hypertension    Lymphadenopathy    Dyslipidemia (high LDL; low HDL)    Asthma    Arthralgia of knee    Diabetes mellitus ([address]    Diverticular disease of left colon    BPH (benign prostatic hyperplasia)    ED (erectile dysfunction)    Gout     Pain free       BP Readings from Last 3 Encounters:   06/15/20 (!) 133/102   06/03/20 (!) 157/95   02/13/19 120/82     1. Pleuritic pain  eval now  - REFERRAL TO EMERGENCY DEPARTMENT    2. Dyspnea and respiratory abnormalities  eval now    3.  Tick bite, initial encounter  Looks benign on video conference

## 2020-07-15 NOTE — ED PROVIDER NOTES
Patient is a 79-year-old male with history of type 2 diabetes, GERD, gout presents emergency department for evaluation of 1 week history of shortness of breath, dry cough, and back pain with deep breaths. He notes a recent surgery on his right hand after a injury falling on a nail and muscle tendon repair, he has followed up with his orthopedist and has been told that he is healing appropriately. He denies chest pain, fever, chills, sweats, dizziness, headache, abdominal pain, nausea, vomiting, diarrhea, leg swelling, or any other complaints at this time. He notes that he works as a  and drives daily, usually 1 to 2 hours at a time. He denies prior history of DVT or pulmonary embolism. He is a non-smoker.            Past Medical History:   Diagnosis Date    Arthralgia of knee 7/15/2013    Diabetes ([address]     type 2    Diverticular disease of left colon 3/22/2013    GERD (gastroesophageal reflux disease)     Gout 12/6/1985       Past Surgical History:   Procedure Laterality Date    HX COLONOSCOPY  2000    Dr. Wilmon Bence- [place] HX HEENT      deviated septum age 12    HX ORTHOPAEDIC  1974    right knee    HX OTHER SURGICAL  1/21/16    excision of lymph node right axilla    HX TONSILLECTOMY      child         Family History:   Problem Relation Age of Onset    Mental Retardation Mother     Cancer Father         lung    Diabetes Maternal Grandmother     Diabetes Paternal Grandmother        Social History     Socioeconomic History    Marital status:      Spouse name: Not on file    Number of children: Not on file    Years of education: Not on file    Highest education level: Not on file   Occupational History    Not on file   Social Needs    Financial resource strain: Not on file    Food insecurity     Worry: Not on file     Inability: Not on file    Transportation needs     Medical: Not on file     Non-medical: Not on file   Tobacco Use    Smoking status: Never Smoker  Smokeless tobacco: Never Used   Substance and Sexual Activity    Alcohol use: Yes     Comment: 1 beer per month    Drug use: No    Sexual activity: Yes     Partners: Female   Lifestyle    Physical activity     Days per week: Not on file     Minutes per session: Not on file    Stress: Not on file   Relationships    Social connections     Talks on phone: Not on file     Gets together: Not on file     Attends Gnosticist service: Not on file     Active member of club or organization: Not on file     Attends meetings of clubs or organizations: Not on file     Relationship status: Not on file    Intimate partner violence     Fear of current or ex partner: Not on file     Emotionally abused: Not on file     Physically abused: Not on file     Forced sexual activity: Not on file   Other Topics Concern    Not on file   Social History Narrative    Not on file         ALLERGIES: Patient has no known allergies. Review of Systems   Constitutional: Negative for chills and fever. HENT: Negative for ear pain and sore throat. Eyes: Negative for visual disturbance. Respiratory: Positive for cough and shortness of breath. Cardiovascular: Negative for chest pain (pleuritic ), palpitations and leg swelling. Gastrointestinal: Negative for abdominal pain. Genitourinary: Negative for flank pain. Musculoskeletal: Negative for back pain. Skin: Negative for color change. Neurological: Negative for dizziness and headaches. Psychiatric/Behavioral: Negative for confusion. Vitals:    07/15/20 1731 07/15/20 1739 07/15/20 1740 07/15/20 1745   BP: 148/76      Pulse: (!) 116   (!) 113   Resp: 22   23   Temp: 98.6 °F (37 °C)      SpO2: 93% 94% 94% 95%   Weight: 104.3 kg (230 lb)      Height: 5' 11\" (1.803 m)               Physical Exam  Vitals signs and nursing note reviewed. Constitutional:       General: He is not in acute distress. Appearance: Normal appearance. He is not ill-appearing.    HENT: Head: Normocephalic and atraumatic. Mouth/Throat:      Pharynx: Oropharynx is clear. Eyes:      Extraocular Movements: Extraocular movements intact. Conjunctiva/sclera: Conjunctivae normal.   Neck:      Musculoskeletal: Normal range of motion. No neck rigidity. Cardiovascular:      Rate and Rhythm: Normal rate and regular rhythm. Pulmonary:      Effort: Pulmonary effort is normal. No tachypnea or respiratory distress. Breath sounds: Normal breath sounds. No decreased breath sounds, wheezing, rhonchi or rales. Chest:      Chest wall: No tenderness. Abdominal:      Palpations: Abdomen is soft. Tenderness: There is no abdominal tenderness. Musculoskeletal: Normal range of motion. Cervical back: Normal.      Thoracic back: Normal.      Right lower leg: He exhibits no tenderness. No edema. Left lower leg: He exhibits no tenderness. No edema. Skin:     General: Skin is warm and dry. Neurological:      General: No focal deficit present. Mental Status: He is alert and oriented to person, place, and time. Psychiatric:         Mood and Affect: Mood normal.          MDM  Number of Diagnoses or Management Options  Diagnosis management comments: Patient is alert, well-appearing, afebrile, tachycardic, 91% oxygen saturation on room air which improved to 96% with 2 L of supplemental oxygen on nasal cannula. Lungs are clear to auscultation bilaterally. History of 1 week of shortness of breath, pleuritic back pain, and dry cough. No complaints of leg swelling, no lower extremity edema or tenderness on exam today. Recent finger surgery 3 weeks ago. He is a non-smoker.   Differential Diagnosis: Pulmonary embolism, pneumonia, viral illness     Plan: CBC, CMP, troponin, EKG, CTA chest, monitor, reassess        Amount and/or Complexity of Data Reviewed  Clinical lab tests: reviewed  Tests in the radiology section of CPT®: reviewed  Tests in the medicine section of CPT®: reviewed  Discuss the patient with other providers: yes (Dr. Maria Sheets )      ED Course as of Jul 15 2043   Wed Jul 15, 2020   1818 ED EKG interpretation:6:19 PM  Rhythm: sinus tachycardia; and regular. Rate (approx.): 106; Axis: left deviation; P wave: normal; ST/T wave: no concerning ST elevations or depressions; Other findings: left ventricular hypertrophy, no changes from prior EKG. KUSH Cooley        []   1679 METABOLIC PANEL, COMPREHENSIVE(!):    Sodium 135(!)   Potassium 3.4(!)   Chloride 102   CO2 27   Anion gap 6   Glucose 170(!)   BUN 9   Creatinine 1.02   BUN/Creatinine ratio 9(!)   GFR est AA >60   GFR est non-AA >60   Calcium 9.0   Bilirubin, total 1.3(!)   ALT 31   AST 23   Alk. phosphatase 67   Protein, total 8.1   Albumin 4.0   Globulin 4.1(!)   A-G Ratio 1.0(!) []   1849 CBC WITH AUTOMATED DIFF(!):    WBC 7.9   RBC 4.65   HGB 14.4   HCT 40.8   MCV 87.7   MCH 31.0   MCHC 35.3   RDW 13.7   PLATELET 747(!)   MPV 10.4   NRBC 0.0   ABSOLUTE NRBC 0.00   NEUTROPHILS 78(!)   LYMPHOCYTES 11(!)   MONOCYTES 8   EOSINOPHILS 3   BASOPHILS 0   IMMATURE GRANULOCYTES 0   ABS. NEUTROPHILS 6.2   ABS. LYMPHOCYTES 0.9   ABS. MONOCYTES 0.7   ABS. EOSINOPHILS 0.2   ABS. BASOPHILS 0.0   ABS. IMM. GRANS. 0.0   DF AUTOMATED []   1849 TROPONIN I:    Troponin-I, Qt. <0.05 []   1930 IMPRESSION:   1. No pulmonary embolism. 2. Bilateral lower lobe atelectasis versus pneumonia. If pneumonia clinically,  consider aspiration. 3. Trace bilateral pleural effusions. 4. Probable hepatic steatosis. Nonobstructing right nephrolithiasis. CTA CHEST W OR W WO CONT [EH]      ED Course User Index  [EH] KUSH Gutierrez       8:45 PM  No evidence of pulmonary embolus on CTA chest. CT shows bilateral atelectasis and small bilateral pleural effusions. EKG shows sinus tachycardia without acute arrhythmic or ischemic changes. Troponin x1 negative. Remainder of ED work-up within normal limits.   Patient ambulated in his room without any signs of desaturation, successful incentive spirometry done by nursing staff, see nursing notes for further details. Patient has been persistently in sinus tachycardia throughout his ED visit, no palpitations or symptoms at this time. Discussed case with attending MD Dr. Caitlin Villarreal who agrees with ED discharge and follow-up with his primary care physician and referral given for cardiology. COVID-19 test done prior to arrival. Pt has been reevaluated. There are no new complaints, changes, or physical findings at this time. Medications have been reviewed w/ pt and/or family. Pt and/or family's questions have been answered. Pt and/or family expressed good understanding of the dx/tx/rx and is in agreement with plan of care. Pt instructed and agreed to f/u w/ PCP and cardiology and to return to ED upon further deterioration. Pt is ready for discharge. IMPRESSION:  1. Bilateral atelectasis    2. Pleural effusion    3. Sinus tachycardia        PLAN:  1. Current Discharge Medication List        2. Follow-up Information     Follow up With Specialties Details Why Contact Info    Cruz Bingham MD Internal Medicine Go in 3 days As needed [facility] [address]  [phone]      [facility] EMERGENCY DEPT Emergency Medicine Go to  If symptoms worsen [address] [facility] 1  [phone]    Robyn King MD Cardiology, Internal Medicine Go to  As needed [address] [facility]  [place] [address] [place] [address]  [phone]              Return to ED if worse       Beth Alina was evaluated in the Emergency Department on 7/15/2020 for the symptoms described in the history of present illness. He/she was evaluated in the context of the global COVID-19 pandemic, which necessitated consideration that the patient might be at risk for infection with the SARS-CoV-2 virus that causes COVID-19.  Institutional protocols and algorithms that pertain to the evaluation of patients at risk for COVID-19 are in a state of rapid change based on information released by regulatory bodies including the CDC and federal and state organizations. These policies and algorithms were followed during the patient's care in the ED. Surrogate Decision Maker (Who do you want to make decisions for you in the event you are not able to?): Extended Emergency Contact Information  Primary Emergency Contact: Mandy Hernandez  Address: [address] [place] [address] [email or web address]  Mobile Phone: [phone]  Relation: Spouse    Please note that this dictation was completed with [employer], the [employer] voice recognition software. Quite often unanticipated grammatical, syntax, homophones, and other interpretive errors are inadvertently transcribed by the computer software. Please disregard these errors. Please excuse any errors that have escaped final proofreading.         Procedures

## 2020-07-15 NOTE — ED TRIAGE NOTES
Patient arrives with c/o cough, SOB and mid back pain x 1 week. Denies known exposure to [address]

## 2020-07-15 NOTE — PROGRESS NOTES
1. Have you been to the ER, urgent care clinic since your last visit? Hospitalized since your last visit? No    2. Have you seen or consulted any other health care providers outside of the [address] [place] [address] since your last visit? Include any pap smears or colon screening. No   Chief Complaint   Patient presents with    Rib Pain     toward the back sharp pain pain is 11 out of 10 unable to lay down without pain.

## 2020-07-16 ENCOUNTER — PATIENT OUTREACH (OUTPATIENT)
Dept: CASE MANAGEMENT | Age: 65
End: 2020-07-16

## 2020-07-16 LAB
ATRIAL RATE: 106 BPM
CALCULATED P AXIS, ECG09: 24 DEGREES
CALCULATED R AXIS, ECG10: -42 DEGREES
CALCULATED T AXIS, ECG11: 19 DEGREES
DIAGNOSIS, 93000: NORMAL
P-R INTERVAL, ECG05: 176 MS
Q-T INTERVAL, ECG07: 342 MS
QRS DURATION, ECG06: 102 MS
QTC CALCULATION (BEZET), ECG08: 454 MS
VENTRICULAR RATE, ECG03: 106 BPM

## 2020-07-16 NOTE — DISCHARGE INSTRUCTIONS
You have been diagnosed with a respiratory illness. Most respiratory illnesses are due viruses but some may be caused by bacteria. Viral illnesses usually get better with time and rest.   If you were diagnosed with a bacterial illness you will likely receive an antibiotic. Please take the following precautions to limit the spread of illness. Stay at home except to get medical care. Seek medical attention if you develop worsening symptoms or new concerns such as severe shortness of breath, chest pain, etc.    Separate yourself from other people and animals in your home. If possible, stay in a separate room and use a separate bathroom from others in your house. Restrict contact with pets, as there is a possibility of transmission of the virus. Call your doctor before showing up at their office. Wear a facemask when you are around other people. Cover your mouth when you cough or sneeze. Wash your hands often with warm soapy water for at least 20 seconds. If soap and water are not available, use an alcohol based hand . Clean all high touch surfaces everyday. For example: counters, tabletops, doorknobs, bathroom fixtures, toilets, phones, keyboards, tablets, and bedside tables. Monitor your symptoms at home. Seek prompt medical attention if your symptoms worsen. (i.e. difficulty breathing). Wear a mask upon entering the facility. Stay at home until all these things have happened: You have had no fevers for at least 72 hours (without fever reducing meds)  AND  Your other symptoms have improved  (e.g. cough, shortness of breath) AND  At least 7 days have passed since the beginning of your symptoms    If you have further questions about the Coronavirus (COVID-19), please contact the [address] [place] [address] at [phone], the [facility] at [phone] or [facility] [phone].

## 2020-07-16 NOTE — PROGRESS NOTES
Patient contacted regarding recent discharge and COVID-19 risk. Discussed COVID-19 related testing which was pending at this time. Test results were pending. Patient informed of results, if available? yes    Care Transition Nurse/ Ambulatory Care Manager contacted the patient by telephone to perform post discharge assessment. Verified name and  with patient as identifiers. Patient has following risk factors of: asthma, diabetes and recent surgery s/p fall. CTN/ACM reviewed discharge instructions, medical action plan and red flags related to discharge diagnosis. Reviewed and educated them on any new and changed medications related to discharge diagnosis. Advised obtaining a 90-day supply of all daily and as-needed medications. Education provided regarding infection prevention, and signs and symptoms of COVID-19 and when to seek medical attention with patient who verbalized understanding. Discussed exposure protocols and quarantine from [address] you at higher risk for severe illness  and given an opportunity for questions and concerns. The patient agrees to contact the COVID-19 hotline [phone] or PCP office for questions related to their healthcare. CTN/ACM provided contact information for future reference. From CDC: Are you at higher risk for severe illness?  Wash your hands often.  Avoid close contact (6 feet, which is about two arm lengths) with people who are sick.  Put distance between yourself and other people if COVID-19 is spreading in your community.  Clean and disinfect frequently touched surfaces.  Avoid all cruise travel and non-essential air travel.  Call your healthcare professional if you have concerns about COVID-19 and your underlying condition or if you are sick.     For more information on steps you can take to protect yourself, see CDC's How to Protect Yourself      Patient/family/caregiver given information for Nico Peraza and agrees to enroll no    Plan for follow-up call in 7-14 days based on severity of symptoms and risk factors. Patient is using incentive spirometer regularly. Has no questions or concerns regarding that at this time. No new symptoms, SOB is stable. Discussed s/s to report to MD or return to ED, patient verbalized understanding.

## 2020-07-16 NOTE — ED NOTES
Ambulated patient in the room. Patient remained 94% on room air. Patient denied difficulty breathing. Reports pain in the back when taking deep breath. Teaching provided on the use of incentive spirometer. Patient demonstrated the use and verbalized understanding. Will continue to monitor.

## 2020-07-18 LAB
SARS-COV-2, COV2NT: NOT DETECTED
SOURCE, COVRS: NORMAL
SPECIMEN SOURCE, FCOV2M: NORMAL

## 2020-07-27 ENCOUNTER — OFFICE VISIT (OUTPATIENT)
Dept: INTERNAL MEDICINE CLINIC | Age: 65
End: 2020-07-27

## 2020-07-27 VITALS — BODY MASS INDEX: 32.08 KG/M2 | WEIGHT: 230 LBS

## 2020-07-27 DIAGNOSIS — J12.9 VIRAL PNEUMONIA: Primary | ICD-10-CM

## 2020-07-27 NOTE — PROGRESS NOTES
1. Have you been to the ER, urgent care clinic since your last visit? Hospitalized since your last visit? No    2. Have you seen or consulted any other health care providers outside of the [address] [place] [address] since your last visit? Include any pap smears or colon screening.  No   Chief Complaint   Patient presents with    Follow-up

## 2020-07-27 NOTE — PROGRESS NOTES
Suzette Ortega is a 76 y.o. male who was seen by synchronous (real-time) audio-video technology on 7/27/2020 for Follow-up        Assessment & Plan:   Diagnoses and all orders for this visit:    1. Viral pneumonia        I spent at least 15 minutes on this visit with this established patient. [id number]  Subjective:   Patient presents for follow-up of recent ER visit. He reports that he had a virtual visit with Dr. Joshua Briggs a little over a week and a half ago and at that time he was advised to go to the ER for further evaluation. He reports while at the ER he had a CT scan of his chest.  He was told that he had viral pneumonia. Patient reports he has been doing breathing treatments at home and reports he is feeling much better. Patient did purchase himself some over-the-counter cough medication that he has been taking as needed. Patient reports on a scale of 0-10 he reports that his symptoms are about a 1. The patient reports that the shortness of breath has improved and he is no longer having the pleuritic chest pain. Patient reports she does have some fatigue by 12 or1 o'clock but other than that he is doing much better. Patient describes his cough as a tickle cough. He denies fever. Prior to Admission medications    Medication Sig Start Date End Date Taking? Authorizing Provider   metFORMIN ER (GLUCOPHAGE XR) 500 mg tablet TAKE 3 TABLETS BY MOUTH EVERY DAY WITH DINNER 4/1/20  Yes Mariam Corral MD   atorvastatin (LIPITOR) 10 mg tablet TAKE 1 TABLET BY MOUTH EVERY DAY 4/1/20  Yes Mariam Corral MD   ondansetron (Zofran ODT) 8 mg disintegrating tablet Take 1 Tab by mouth every eight (8) hours as needed for Nausea. 6/15/20   Concetta Hammond PA-C   docusate sodium (COLACE) 50 mg capsule Take 1 Cap by mouth two (2) times a day for 90 days. 6/15/20 9/13/20  Concetta Hammond PA-C   aspirin delayed-release 81 mg tablet Take 81 mg by mouth nightly.  6/2/15   Mariam Corral MD   ibuprofen (MOTRIN) 200 mg tablet Take 800 mg by mouth as needed. 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 06/15/2020    Borderline hypertension 04/01/2020    Lymphadenopathy 01/21/2016    Dyslipidemia (high LDL; low HDL) 12/03/2014    Asthma 07/31/2014    Arthralgia of knee 07/15/2013    Diabetes mellitus ([address] 04/23/2013    Diverticular disease of left colon 03/22/2013    BPH (benign prostatic hyperplasia) 03/22/2013    ED (erectile dysfunction) 03/22/2011    Gout 12/06/1985     Current Outpatient Medications   Medication Sig Dispense Refill    metFORMIN ER (GLUCOPHAGE XR) 500 mg tablet TAKE 3 TABLETS BY MOUTH EVERY DAY WITH DINNER 270 Tab 1    atorvastatin (LIPITOR) 10 mg tablet TAKE 1 TABLET BY MOUTH EVERY DAY 90 Tab 1    ondansetron (Zofran ODT) 8 mg disintegrating tablet Take 1 Tab by mouth every eight (8) hours as needed for Nausea. 10 Tab 2    docusate sodium (COLACE) 50 mg capsule Take 1 Cap by mouth two (2) times a day for 90 days. 60 Cap 2    aspirin delayed-release 81 mg tablet Take 81 mg by mouth nightly.  ibuprofen (MOTRIN) 200 mg tablet Take 800 mg by mouth as needed. No Known Allergies    ROS  See above  Objective:   No flowsheet data found. General: alert, cooperative, no distress   Mental  status: normal mood, behavior, speech, dress, motor activity, and thought processes, able to follow commands   HENT: NCAT   Neck: no visualized mass   Resp: no respiratory distress   Neuro: no gross deficits   Skin: no discoloration or lesions of concern on visible areas   Psychiatric: normal affect, consistent with stated mood, no evidence of hallucinations     Additional exam findings: We discussed the expected course, resolution and complications of the diagnosis(es) in detail.   Medication risks, benefits, costs, interactions, and alternatives were discussed as indicated. I advised him to contact the office if his condition worsens, changes or fails to improve as anticipated. He expressed understanding with the diagnosis(es) and plan. Cole Cuba, who was evaluated through a patient-initiated, synchronous (real-time) audio-video encounter, and/or his healthcare decision maker, is aware that it is a billable service, with coverage as determined by his insurance carrier. He provided verbal consent to proceed: Yes, and patient identification was verified. It was conducted pursuant to the emergency declaration under the [facility], [address] [place] [address] authority and the [employer] Act. A caregiver was present when appropriate. Ability to conduct physical exam was limited. I was at home. The patient was at home.       Ericka Barney PA-C

## 2020-07-31 ENCOUNTER — PATIENT OUTREACH (OUTPATIENT)
Dept: CASE MANAGEMENT | Age: 65
End: 2020-07-31

## 2020-07-31 NOTE — PROGRESS NOTES
Patient resolved from Transition of Care episode on 07/31/20   Discussed COVID-19 related testing which was available at this time. Test results were negative. Patient informed of results, if available? no     Patient/family has been provided the following resources and education related to COVID-19:                         Signs, symptoms and red flags related to COVID-19            CDC exposure and quarantine guidelines            [employer] exposure contact - [phone]            Contact for their local Department of Health                 Patient currently reports that the following symptoms have improved:  fatigue, cough, no new symptoms and no worsening symptoms. No further outreach scheduled with this CTN/ACM/LPN/HC/ MA. Episode of Care resolved. Patient has this CTN/ACM/LPN/HC/MA contact information if future needs arise.

## 2020-10-05 RX ORDER — ATORVASTATIN CALCIUM 10 MG/1
TABLET, FILM COATED ORAL
Qty: 90 TAB | Refills: 1 | Status: SHIPPED | OUTPATIENT
Start: 2020-10-05 | End: 2021-04-09

## 2020-11-20 ENCOUNTER — PATIENT MESSAGE (OUTPATIENT)
Dept: INTERNAL MEDICINE CLINIC | Age: 65
End: 2020-11-20

## 2021-04-09 RX ORDER — ATORVASTATIN CALCIUM 10 MG/1
TABLET, FILM COATED ORAL
Qty: 90 TAB | Refills: 1 | Status: SHIPPED | OUTPATIENT
Start: 2021-04-09 | End: 2022-03-13

## 2021-04-21 ENCOUNTER — OFFICE VISIT (OUTPATIENT)
Dept: INTERNAL MEDICINE CLINIC | Age: 66
End: 2021-04-21
Payer: MEDICARE

## 2021-04-21 ENCOUNTER — TELEPHONE (OUTPATIENT)
Dept: INTERNAL MEDICINE CLINIC | Age: 66
End: 2021-04-21

## 2021-04-21 VITALS
BODY MASS INDEX: 33.8 KG/M2 | RESPIRATION RATE: 16 BRPM | HEART RATE: 73 BPM | HEIGHT: 71 IN | DIASTOLIC BLOOD PRESSURE: 90 MMHG | WEIGHT: 241.4 LBS | TEMPERATURE: 98 F | SYSTOLIC BLOOD PRESSURE: 160 MMHG

## 2021-04-21 DIAGNOSIS — E78.5 DYSLIPIDEMIA (HIGH LDL; LOW HDL): ICD-10-CM

## 2021-04-21 DIAGNOSIS — Z12.5 PROSTATE CANCER SCREENING: ICD-10-CM

## 2021-04-21 DIAGNOSIS — L71.9 ACNE ROSACEA: ICD-10-CM

## 2021-04-21 DIAGNOSIS — L71.9 ROSACEA: ICD-10-CM

## 2021-04-21 DIAGNOSIS — E11.9 TYPE 2 DIABETES MELLITUS WITHOUT COMPLICATION, WITHOUT LONG-TERM CURRENT USE OF INSULIN (HCC): ICD-10-CM

## 2021-04-21 DIAGNOSIS — I10 ESSENTIAL HYPERTENSION: ICD-10-CM

## 2021-04-21 DIAGNOSIS — Z12.11 COLON CANCER SCREENING: Primary | ICD-10-CM

## 2021-04-21 DIAGNOSIS — Z00.00 MEDICARE ANNUAL WELLNESS VISIT, SUBSEQUENT: ICD-10-CM

## 2021-04-21 PROCEDURE — 3017F COLORECTAL CA SCREEN DOC REV: CPT | Performed by: INTERNAL MEDICINE

## 2021-04-21 PROCEDURE — 3046F HEMOGLOBIN A1C LEVEL >9.0%: CPT | Performed by: INTERNAL MEDICINE

## 2021-04-21 PROCEDURE — 3288F FALL RISK ASSESSMENT DOCD: CPT | Performed by: INTERNAL MEDICINE

## 2021-04-21 PROCEDURE — 2022F DILAT RTA XM EVC RTNOPTHY: CPT | Performed by: INTERNAL MEDICINE

## 2021-04-21 PROCEDURE — G8536 NO DOC ELDER MAL SCRN: HCPCS | Performed by: INTERNAL MEDICINE

## 2021-04-21 PROCEDURE — G0463 HOSPITAL OUTPT CLINIC VISIT: HCPCS | Performed by: INTERNAL MEDICINE

## 2021-04-21 PROCEDURE — 99214 OFFICE O/P EST MOD 30 MIN: CPT | Performed by: INTERNAL MEDICINE

## 2021-04-21 PROCEDURE — 1100F PTFALLS ASSESS-DOCD GE2>/YR: CPT | Performed by: INTERNAL MEDICINE

## 2021-04-21 PROCEDURE — G8510 SCR DEP NEG, NO PLAN REQD: HCPCS | Performed by: INTERNAL MEDICINE

## 2021-04-21 PROCEDURE — G8753 SYS BP > OR = 140: HCPCS | Performed by: INTERNAL MEDICINE

## 2021-04-21 PROCEDURE — G0439 PPPS, SUBSEQ VISIT: HCPCS | Performed by: INTERNAL MEDICINE

## 2021-04-21 PROCEDURE — G8755 DIAS BP > OR = 90: HCPCS | Performed by: INTERNAL MEDICINE

## 2021-04-21 PROCEDURE — G8417 CALC BMI ABV UP PARAM F/U: HCPCS | Performed by: INTERNAL MEDICINE

## 2021-04-21 PROCEDURE — G8427 DOCREV CUR MEDS BY ELIG CLIN: HCPCS | Performed by: INTERNAL MEDICINE

## 2021-04-21 RX ORDER — LISINOPRIL AND HYDROCHLOROTHIAZIDE 10; 12.5 MG/1; MG/1
1 TABLET ORAL DAILY
Qty: 90 TAB | Refills: 1 | Status: SHIPPED | OUTPATIENT
Start: 2021-04-21 | End: 2021-10-21 | Stop reason: SDUPTHER

## 2021-04-21 RX ORDER — DOXYCYCLINE 100 MG/1
100 CAPSULE ORAL DAILY
Qty: 90 CAP | Refills: 1 | Status: SHIPPED | OUTPATIENT
Start: 2021-04-21 | End: 2021-10-18

## 2021-04-21 RX ORDER — SILDENAFIL 100 MG/1
100 TABLET, FILM COATED ORAL AS NEEDED
Qty: 10 TAB | Refills: 2 | Status: SHIPPED | OUTPATIENT
Start: 2021-04-21 | End: 2022-06-19 | Stop reason: SDUPTHER

## 2021-04-21 NOTE — PROGRESS NOTES
1. Have you been to the ER, urgent care clinic since your last visit? Hospitalized since your last visit? No    2. Have you seen or consulted any other health care providers outside of the [address] [place] [address] since your last visit? Include any pap smears or colon screening.  No   Chief Complaint   Patient presents with    Diabetes    Cholesterol Problem

## 2021-04-21 NOTE — PROGRESS NOTES
This is the Subsequent Medicare Annual Wellness Exam, performed 12 months or more after the Initial AWV or the last Subsequent AWV    I have reviewed the patient's medical history in detail and updated the computerized patient record. Assessment/Plan   Education and counseling provided:  Are appropriate based on today's review and evaluation    1. Colon cancer screening  -     COLOGUARD TEST (FECAL DNA COLORECTAL CANCER SCREENING)  2. Type 2 diabetes mellitus without complication, without long-term current use of insulin (HCC)  -     CBC WITH AUTOMATED DIFF; Future  -     LIPID PANEL; Future  -     METABOLIC PANEL, COMPREHENSIVE; Future  -     HEMOGLOBIN A1C WITH EAG; Future  3. Acne rosacea  -     doxycycline (VIBRAMYCIN) 100 mg capsule; Take 1 Cap by mouth daily. , Normal, Disp-90 Cap, R-1  4. Rosacea  -     doxycycline (VIBRAMYCIN) 100 mg capsule; Take 1 Cap by mouth daily. , Normal, Disp-90 Cap, R-1  5. Essential hypertension  -     lisinopril-hydroCHLOROthiazide (PRINZIDE, ZESTORETIC) 10-12.5 mg per tablet; Take 1 Tab by mouth daily. , Normal, Disp-90 Tab, R-1  6. Prostate cancer screening  -     PSA SCREENING (SCREENING); Future  7. Medicare annual wellness visit, subsequent       Depression Risk Factor Screening     3 most recent PHQ Screens 4/21/2021   Little interest or pleasure in doing things Not at all   Feeling down, depressed, irritable, or hopeless Not at all   Total Score PHQ 2 0       Alcohol Risk Screen    Do you average more than 1 drink per night or more than 7 drinks a week: In the past three months have you have had more than 4 drinks containing alcohol on one occasion:         Functional Ability and Level of Safety    Hearing: Hearing is good. Activities of Daily Living: The home contains: no safety equipment. Patient does total self care      Ambulation: with no difficulty     Fall Risk:  Fall Risk Assessment, last 12 mths 4/21/2021   Able to walk?  Yes   Fall in past 15 months? 1   Do you feel unsteady? 1   Are you worried about falling 1   Is the gait abnormal? 0   Number of falls in past 12 months 1   Fall with injury?  1      Abuse Screen:  Patient is not abused       Cognitive Screening    Has your family/caregiver stated any concerns about your memory: no     Cognitive Screening: Normal - Verbal Fluency Test    Health Maintenance Due     Health Maintenance Due   Topic Date Due    Hepatitis C Screening  Never done    Shingrix Vaccine Age 50> (1 of 2) Never done    Colorectal Cancer Screening Combo  12/02/2015    Eye Exam Retinal or Dilated  02/03/2017    Pneumococcal 65+ years (1 of 1 - PPSV23) 06/25/2019    Foot Exam Q1  11/08/2019    A1C test (Diabetic or Prediabetic)  12/06/2019    MICROALBUMIN Q1  12/06/2019       Patient Care Team   Patient Care Team:  Jimmie Johnson MD as PCP - General (Internal Medicine)  Jimmie Johnson MD as PCP - [facility] Empaneled Provider  Christie Schrader MD as Surgeon (General Surgery)    History     Patient Active Problem List   Diagnosis Code    Gout M10.9    Diverticular disease of left colon K57.30    ED (erectile dysfunction) N52.9    BPH (benign prostatic hyperplasia) N40.0    Diabetes mellitus ([address] E11.9    Arthralgia of knee M25.569    Asthma J45.909    Dyslipidemia (high LDL; low HDL) E78.5    Lymphadenopathy R59.1    Borderline hypertension R03.0    Laceration of right index finger without foreign body S61.210A    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 Tyra Trinidad    HX HEENT      deviated septum age 12    HX ORTHOPAEDIC  1974    right knee    HX OTHER SURGICAL  1/21/16    excision of lymph node right axilla    HX TONSILLECTOMY      child     Current Outpatient Medications   Medication Sig Dispense Refill    doxycycline (VIBRAMYCIN) 100 mg capsule Take 1 Cap by mouth daily. 90 Cap 1    lisinopril-hydroCHLOROthiazide (PRINZIDE, ZESTORETIC) 10-12.5 mg per tablet Take 1 Tab by mouth daily. 90 Tab 1    sildenafil citrate (Viagra) 100 mg tablet Take 1 Tab by mouth as needed for Erectile Dysfunction. 10 Tab 2    atorvastatin (LIPITOR) 10 mg tablet TAKE 1 TABLET BY MOUTH EVERY DAY 90 Tab 1    metFORMIN ER (GLUCOPHAGE XR) 500 mg tablet TAKE 3 TABLETS BY MOUTH EVERY DAY WITH DINNER 270 Tab 0    aspirin delayed-release 81 mg tablet Take 81 mg by mouth nightly.  ibuprofen (MOTRIN) 200 mg tablet Take 800 mg by mouth as needed. No Known Allergies    Family History   Problem Relation Age of Onset    Mental Retardation Mother     Cancer Father         lung    Diabetes Maternal Grandmother     Diabetes Paternal Grandmother      Social History     Tobacco Use    Smoking status: Never Smoker    Smokeless tobacco: Never Used   Substance Use Topics    Alcohol use: Yes     Comment: 1 beer per month         Dariela Freedman MD       SUBJECTIVE: Isabella Keller is a 76 y.o. male seen for a follow up visit; he has diabetes, hypertension, hyperlipidemia and ED is a problem   Side effects viagra. Current Outpatient Medications   Medication Sig Dispense Refill    doxycycline (VIBRAMYCIN) 100 mg capsule Take 1 Cap by mouth daily. 90 Cap 1    lisinopril-hydroCHLOROthiazide (PRINZIDE, ZESTORETIC) 10-12.5 mg per tablet Take 1 Tab by mouth daily. 90 Tab 1    sildenafil citrate (Viagra) 100 mg tablet Take 1 Tab by mouth as needed for Erectile Dysfunction.  10 Tab 2    atorvastatin (LIPITOR) 10 mg tablet TAKE 1 TABLET BY MOUTH EVERY DAY 90 Tab 1    metFORMIN ER (GLUCOPHAGE XR) 500 mg tablet TAKE 3 TABLETS BY MOUTH EVERY DAY WITH DINNER 270 Tab 0    aspirin delayed-release 81 mg tablet Take 81 mg by mouth nightly.  ibuprofen (MOTRIN) 200 mg tablet Take 800 mg by mouth as needed. 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 New concerns: ed . OBJECTIVE:  Visit Vitals  BP (!) 160/90   Pulse 73   Temp 98 °F (36.7 °C) (Temporal)   Resp 16   Ht 5' 11\" (1.803 m)   Wt 241 lb 6.4 oz (109.5 kg)   BMI 33.67 kg/m²      Vitals:    04/21/21 0959 04/21/21 1018   BP: (!) 169/92 (!) 160/90   Pulse: 73    Resp: 16    Temp: 98 °F (36.7 °C)    TempSrc: Temporal    Weight: 241 lb 6.4 oz (109.5 kg)    Height: 5' 11\" (1.803 m)        Appearance: alert, well appearing, and in no distress and overweight. General exam: CVS exam BP noted to be mildly elevated today in office, S1, S2 normal, no gallop, no murmur, chest clear, no JVD, no HSM, no edema. Face with rosacea  Lab review: orders written for new lab studies as appropriate; see orders. ASSESSMENT:  diabetes control uncertain, hypertension poorly controlled, hyperlipidemia control uncertain, ed switch to cialis    Use   Alternate to insurance.     PLAN:  lab results and schedule of future lab studies reviewed with patient  repeat labs ordered prior to next appointment  orders and follow up as documented in patient record  reviewed diet, exercise and weight control  add blood pressure medication. 1. Colon cancer screening  Does not want colonoscopy now  - COLOGUARD TEST (FECAL DNA COLORECTAL CANCER SCREENING)    2. Type 2 diabetes mellitus without complication, without long-term current use of insulin ([facility]  ? ?? control  - CBC WITH AUTOMATED DIFF; Future  - LIPID PANEL; Future  - METABOLIC PANEL, COMPREHENSIVE; Future  - HEMOGLOBIN A1C WITH EAG; Future    3. Acne rosacea  On face saw derm will refill  - doxycycline (VIBRAMYCIN) 100 mg capsule; Take 1 Cap by mouth daily. Dispense: 90 Cap; Refill: 1    4. Rosacea    - doxycycline (VIBRAMYCIN) 100 mg capsule; Take 1 Cap by mouth daily. Dispense: 90 Cap; Refill: 1    5. Essential hypertension  Start new med   - lisinopril-hydroCHLOROthiazide (PRINZIDE, ZESTORETIC) 10-12.5 mg per tablet; Take 1 Tab by mouth daily. Dispense: 90 Tab; Refill: 1    6. Prostate cancer screening  due  - PSA SCREENING (SCREENING); Future    7. Medicare annual wellness visit, subsequent      8.  Dyslipidemia (high LDL; low HDL)  On statin

## 2021-04-21 NOTE — PATIENT INSTRUCTIONS
Medicare Wellness Visit, Male    The best way to live healthy is to have a lifestyle where you eat a well-balanced diet, exercise regularly, limit alcohol use, and quit all forms of tobacco/nicotine, if applicable. Regular preventive services are another way to keep healthy. Preventive services (vaccines, screening tests, monitoring & exams) can help personalize your care plan, which helps you manage your own care. Screening tests can find health problems at the earliest stages, when they are easiest to treat. [facility] follows the current, evidence-based guidelines published by the [facility] ([facility]) when recommending preventive services for our patients. Because we follow these guidelines, sometimes recommendations change over time as research supports it. (For example, a prostate screening blood test is no longer routinely recommended for men with no symptoms). Of course, you and your doctor may decide to screen more often for some diseases, based on your risk and co-morbidities (chronic disease you are already diagnosed with). Preventive services for you include:  - Medicare offers their members a free annual wellness visit, which is time for you and your primary care provider to discuss and plan for your preventive service needs. Take advantage of this benefit every year!  -All adults over age 72 should receive the recommended pneumonia vaccines. Current USPSTF guidelines recommend a series of two vaccines for the best pneumonia protection.   -All adults should have a flu vaccine yearly and tetanus vaccine every 10 years.  -All adults age 48 and older should receive the shingles vaccines (series of two vaccines).        -All adults age 38-68 who are overweight should have a diabetes screening test once every three years.   -Other screening tests & preventive services for persons with diabetes include: an eye exam to screen for diabetic retinopathy, a kidney function test, a foot exam, and stricter control over your cholesterol.   -Cardiovascular screening for adults with routine risk involves an electrocardiogram (ECG) at intervals determined by the provider.   -Colorectal cancer screening should be done for adults age 54-65 with no increased risk factors for colorectal cancer. There are a number of acceptable methods of screening for this type of cancer. Each test has its own benefits and drawbacks. Discuss with your provider what is most appropriate for you during your annual wellness visit. The different tests include: colonoscopy (considered the best screening method), a fecal occult blood test, a fecal DNA test, and sigmoidoscopy.  -All adults born between [facility] should be screened once for Hepatitis C.  -An Abdominal Aortic Aneurysm (AAA) Screening is recommended for men age 73-68 who has ever smoked in their lifetime.      Here is a list of your current Health Maintenance items (your personalized list of preventive services) with a due date:  Health Maintenance Due   Topic Date Due    Hepatitis C Test  Never done    Shingles Vaccine (1 of 2) Never done    Colorectal Screening  12/02/2015    Eye Exam  02/03/2017    Pneumococcal Vaccine (1 of 1 - PPSV23) 06/25/2019    Diabetic Foot Care  11/08/2019    Hemoglobin A1C    12/06/2019    Albumin Urine Test  12/06/2019

## 2021-05-22 RX ORDER — METFORMIN HYDROCHLORIDE 500 MG/1
TABLET, EXTENDED RELEASE ORAL
Qty: 270 TABLET | Refills: 0 | Status: SHIPPED | OUTPATIENT
Start: 2021-05-22 | End: 2021-08-15

## 2021-08-15 RX ORDER — METFORMIN HYDROCHLORIDE 500 MG/1
TABLET, EXTENDED RELEASE ORAL
Qty: 270 TABLET | Refills: 0 | Status: SHIPPED | OUTPATIENT
Start: 2021-08-15 | End: 2022-06-12

## 2021-10-18 DIAGNOSIS — L71.9 ROSACEA: ICD-10-CM

## 2021-10-18 DIAGNOSIS — L71.9 ACNE ROSACEA: ICD-10-CM

## 2021-10-18 RX ORDER — DOXYCYCLINE 100 MG/1
CAPSULE ORAL
Qty: 90 CAPSULE | Refills: 1 | Status: SHIPPED | OUTPATIENT
Start: 2021-10-18

## 2021-10-21 ENCOUNTER — OFFICE VISIT (OUTPATIENT)
Dept: INTERNAL MEDICINE CLINIC | Age: 66
End: 2021-10-21
Payer: MEDICARE

## 2021-10-21 VITALS
SYSTOLIC BLOOD PRESSURE: 160 MMHG | HEIGHT: 71 IN | OXYGEN SATURATION: 98 % | TEMPERATURE: 98.3 F | WEIGHT: 239 LBS | RESPIRATION RATE: 14 BRPM | HEART RATE: 78 BPM | BODY MASS INDEX: 33.46 KG/M2 | DIASTOLIC BLOOD PRESSURE: 85 MMHG

## 2021-10-21 DIAGNOSIS — E78.5 DYSLIPIDEMIA (HIGH LDL; LOW HDL): ICD-10-CM

## 2021-10-21 DIAGNOSIS — Z23 NEEDS FLU SHOT: ICD-10-CM

## 2021-10-21 DIAGNOSIS — I10 HTN, GOAL BELOW 130/80: ICD-10-CM

## 2021-10-21 DIAGNOSIS — I10 ESSENTIAL HYPERTENSION: ICD-10-CM

## 2021-10-21 DIAGNOSIS — E11.9 TYPE 2 DIABETES MELLITUS WITHOUT COMPLICATION, WITHOUT LONG-TERM CURRENT USE OF INSULIN (HCC): Primary | ICD-10-CM

## 2021-10-21 PROCEDURE — G8536 NO DOC ELDER MAL SCRN: HCPCS | Performed by: INTERNAL MEDICINE

## 2021-10-21 PROCEDURE — G8753 SYS BP > OR = 140: HCPCS | Performed by: INTERNAL MEDICINE

## 2021-10-21 PROCEDURE — G8417 CALC BMI ABV UP PARAM F/U: HCPCS | Performed by: INTERNAL MEDICINE

## 2021-10-21 PROCEDURE — G8510 SCR DEP NEG, NO PLAN REQD: HCPCS | Performed by: INTERNAL MEDICINE

## 2021-10-21 PROCEDURE — 99214 OFFICE O/P EST MOD 30 MIN: CPT | Performed by: INTERNAL MEDICINE

## 2021-10-21 PROCEDURE — 1101F PT FALLS ASSESS-DOCD LE1/YR: CPT | Performed by: INTERNAL MEDICINE

## 2021-10-21 PROCEDURE — G8754 DIAS BP LESS 90: HCPCS | Performed by: INTERNAL MEDICINE

## 2021-10-21 PROCEDURE — G8427 DOCREV CUR MEDS BY ELIG CLIN: HCPCS | Performed by: INTERNAL MEDICINE

## 2021-10-21 PROCEDURE — 3017F COLORECTAL CA SCREEN DOC REV: CPT | Performed by: INTERNAL MEDICINE

## 2021-10-21 PROCEDURE — 90694 VACC AIIV4 NO PRSRV 0.5ML IM: CPT | Performed by: INTERNAL MEDICINE

## 2021-10-21 PROCEDURE — 2022F DILAT RTA XM EVC RTNOPTHY: CPT | Performed by: INTERNAL MEDICINE

## 2021-10-21 RX ORDER — LISINOPRIL AND HYDROCHLOROTHIAZIDE 10; 12.5 MG/1; MG/1
1 TABLET ORAL DAILY
Qty: 90 TABLET | Refills: 1
Start: 2021-10-21

## 2021-10-21 NOTE — PROGRESS NOTES
SUBJECTIVE: Amy Weldon is a 76 y.o. male seen for a follow up visit; he has diabetes, hypertension, hyperlipidemia and ED is a problem   Side effects viagra. Current Outpatient Medications   Medication Sig Dispense Refill    doxycycline (VIBRAMYCIN) 100 mg capsule Take 1 Cap by mouth daily. 90 Cap 1    lisinopril-hydroCHLOROthiazide (PRINZIDE, ZESTORETIC) 10-12.5 mg per tablet Take 1 Tab by mouth daily. 90 Tab 1    sildenafil citrate (Viagra) 100 mg tablet Take 1 Tab by mouth as needed for Erectile Dysfunction. 10 Tab 2    atorvastatin (LIPITOR) 10 mg tablet TAKE 1 TABLET BY MOUTH EVERY DAY 90 Tab 1    metFORMIN ER (GLUCOPHAGE XR) 500 mg tablet TAKE 3 TABLETS BY MOUTH EVERY DAY WITH DINNER 270 Tab 0    aspirin delayed-release 81 mg tablet Take 81 mg by mouth nightly.  ibuprofen (MOTRIN) 200 mg tablet Take 800 mg by mouth as needed. 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   New concerns: ed  Never used BP med  He has bad klnees and is going to [employer] soonOBJECTIVE:  Visit Vitals  BP (!) 160/90   Pulse 73   Temp 98 °F (36.7 °C) (Temporal)   Resp 16 Ht 5' 11\" (1.803 m)   Wt 241 lb 6.4 oz (109.5 kg)   BMI 33.67 kg/m²      Vitals:    04/21/21 0959 04/21/21 1018   BP: (!) 169/92 (!) 160/90   Pulse: 73    Resp: 16    Temp: 98 °F (36.7 °C)    TempSrc: Temporal    Weight: 241 lb 6.4 oz (109.5 kg)    Height: 5' 11\" (1.803 m)        Appearance: alert, well appearing, and in no distress and overweight. General exam: CVS exam BP noted to be mildly elevated today in office, S1, S2 normal, no gallop, no murmur, chest clear, no JVD, no HSM, no edema. Face with rosacea  Lab review: orders written for new lab studies as appropriate; see orders. ASSESSMENT:  diabetes control   stable, hypertension poorly controlled, hyperlipidemia control uncertain, ed switch to cialis    Use   Altern    PLAN:  lab results and schedule of future lab studies reviewed with patient  repeat labs ordered prior to next appointment  orders and follow up as documented in patient record  reviewed diet, exercise and weight control  add blood pressure medication. Diagnoses and all orders for this visit:    1. Type 2 diabetes mellitus without complication, without long-term current use of insulin (HCC)  -     HEMOGLOBIN A1C WITH EAG; Future  -     LIPID PANEL; Future  -     METABOLIC PANEL, COMPREHENSIVE; Future  -     MICROALBUMIN, UR, RAND W/ MICROALB/CREAT RATIO; Future    2. Needs flu shot  -     FLU (FLUAD QUAD INFLUENZA VACCINE,QUAD,ADJUVANTED)    3. Essential hypertension  -     lisinopril-hydroCHLOROthiazide (PRINZIDE, ZESTORETIC) 10-12.5 mg per tablet; Take 1 Tablet by mouth daily.     4. HTN, goal below 130/80      Lipid abnormality controlled

## 2021-10-21 NOTE — PROGRESS NOTES
Reviewed record in preparation for visit and have obtained necessary documentation. Identified pt with two pt identifiers(name and ). Chief Complaint   Patient presents with    Follow-up     Blood pressure (!) 160/85, pulse 78, temperature 98.3 °F (36.8 °C), temperature source Temporal, resp. rate 14, height 5' 11\" (1.803 m), weight 239 lb (108.4 kg), SpO2 98 %. Health Maintenance Due   Topic Date Due    Hepatitis C Test  Never done    Shingles Vaccine (1 of 2) Never done    Eye Exam  2017    Pneumococcal Vaccine (1 of 1 - PPSV23) 2019    Diabetic Foot Care  2019    Albumin Urine Test  2019    Yearly Flu Vaccine (1) 2021       Mr. Conrado Peters has a reminder for a \"due or due soon\" health maintenance. I have asked that he discuss this further with his primary care provider for follow-up on this health maintenance. Coordination of Care Questionnaire:  :     1) Have you been to an emergency room, urgent care clinic since your last visit? no   Hospitalized since your last visit? no             2) Have you seen or consulted any other health care providers outside of [address] [place] [address] since your last visit? no  (Include any pap smears or colon screenings in this section.)    3) In the event something were to happen to you and you were unable to speak on your behalf, do you have an Advance Directive/ Living Will in place stating your wishes?  Unsure

## 2021-11-08 ENCOUNTER — HOSPITAL ENCOUNTER (EMERGENCY)
Age: 66
Discharge: HOME | End: 2021-11-08
Attending: EMERGENCY MEDICINE
Payer: MEDICARE

## 2021-11-08 ENCOUNTER — OFFICE VISIT (OUTPATIENT)
Dept: INTERNAL MEDICINE CLINIC | Age: 66
End: 2021-11-08
Payer: MEDICARE

## 2021-11-08 ENCOUNTER — APPOINTMENT (OUTPATIENT)
Dept: CT IMAGING | Age: 66
End: 2021-11-08
Attending: EMERGENCY MEDICINE
Payer: MEDICARE

## 2021-11-08 VITALS
DIASTOLIC BLOOD PRESSURE: 69 MMHG | BODY MASS INDEX: 33.04 KG/M2 | HEART RATE: 83 BPM | TEMPERATURE: 98 F | HEIGHT: 71 IN | OXYGEN SATURATION: 98 % | RESPIRATION RATE: 20 BRPM | SYSTOLIC BLOOD PRESSURE: 120 MMHG | WEIGHT: 236 LBS

## 2021-11-08 VITALS
DIASTOLIC BLOOD PRESSURE: 75 MMHG | HEART RATE: 97 BPM | RESPIRATION RATE: 12 BRPM | OXYGEN SATURATION: 97 % | TEMPERATURE: 98.3 F | SYSTOLIC BLOOD PRESSURE: 132 MMHG

## 2021-11-08 DIAGNOSIS — N17.9 AKI (ACUTE KIDNEY INJURY) (HCC): Primary | ICD-10-CM

## 2021-11-08 DIAGNOSIS — N20.0 NEPHROLITHIASIS: ICD-10-CM

## 2021-11-08 DIAGNOSIS — R30.0 DYSURIA: Primary | ICD-10-CM

## 2021-11-08 DIAGNOSIS — R10.9 FLANK PAIN: ICD-10-CM

## 2021-11-08 DIAGNOSIS — N20.0 KIDNEY STONE: ICD-10-CM

## 2021-11-08 LAB
ALBUMIN SERPL-MCNC: 3.9 G/DL (ref 3.5–5)
ALBUMIN/GLOB SERPL: 0.9 {RATIO} (ref 1.1–2.2)
ALP SERPL-CCNC: 67 U/L (ref 45–117)
ALT SERPL-CCNC: 34 U/L (ref 12–78)
ANION GAP SERPL CALC-SCNC: 6 MMOL/L (ref 5–15)
APPEARANCE UR: CLEAR
AST SERPL-CCNC: 22 U/L (ref 15–37)
BACTERIA URNS QL MICRO: NEGATIVE /HPF
BASOPHILS # BLD: 0 K/UL (ref 0–0.1)
BASOPHILS NFR BLD: 1 % (ref 0–1)
BILIRUB SERPL-MCNC: 1 MG/DL (ref 0.2–1)
BILIRUB UR QL STRIP: NEGATIVE
BILIRUB UR QL: NEGATIVE
BUN SERPL-MCNC: 22 MG/DL (ref 6–20)
BUN/CREAT SERPL: 10 (ref 12–20)
CALCIUM SERPL-MCNC: 9.3 MG/DL (ref 8.5–10.1)
CHLORIDE SERPL-SCNC: 100 MMOL/L (ref 97–108)
CO2 SERPL-SCNC: 26 MMOL/L (ref 21–32)
COLOR UR: ABNORMAL
COMMENT, HOLDF: NORMAL
CREAT SERPL-MCNC: 2.17 MG/DL (ref 0.7–1.3)
DIFFERENTIAL METHOD BLD: ABNORMAL
EOSINOPHIL # BLD: 0.2 K/UL (ref 0–0.4)
EOSINOPHIL NFR BLD: 2 % (ref 0–7)
EPITH CASTS URNS QL MICRO: ABNORMAL /LPF
ERYTHROCYTE [DISTWIDTH] IN BLOOD BY AUTOMATED COUNT: 13.5 % (ref 11.5–14.5)
GLOBULIN SER CALC-MCNC: 4.5 G/DL (ref 2–4)
GLUCOSE SERPL-MCNC: 221 MG/DL (ref 65–100)
GLUCOSE UR STRIP.AUTO-MCNC: 250 MG/DL
GLUCOSE UR-MCNC: NORMAL MG/DL
HCT VFR BLD AUTO: 45 % (ref 36.6–50.3)
HGB BLD-MCNC: 15.3 G/DL (ref 12.1–17)
HGB UR QL STRIP: ABNORMAL
IMM GRANULOCYTES # BLD AUTO: 0 K/UL (ref 0–0.04)
IMM GRANULOCYTES NFR BLD AUTO: 0 % (ref 0–0.5)
KETONES P FAST UR STRIP-MCNC: NEGATIVE MG/DL
KETONES UR QL STRIP.AUTO: NEGATIVE MG/DL
LEUKOCYTE ESTERASE UR QL STRIP.AUTO: NEGATIVE
LYMPHOCYTES # BLD: 0.9 K/UL (ref 0.8–3.5)
LYMPHOCYTES NFR BLD: 11 % (ref 12–49)
MCH RBC QN AUTO: 31 PG (ref 26–34)
MCHC RBC AUTO-ENTMCNC: 34 G/DL (ref 30–36.5)
MCV RBC AUTO: 91.1 FL (ref 80–99)
MONOCYTES # BLD: 0.7 K/UL (ref 0–1)
MONOCYTES NFR BLD: 9 % (ref 5–13)
NEUTS SEG # BLD: 6.1 K/UL (ref 1.8–8)
NEUTS SEG NFR BLD: 77 % (ref 32–75)
NITRITE UR QL STRIP.AUTO: NEGATIVE
NRBC # BLD: 0 K/UL (ref 0–0.01)
NRBC BLD-RTO: 0 PER 100 WBC
PH UR STRIP: 5 [PH] (ref 5–8)
PH UR STRIP: 5.5 [PH] (ref 4.6–8)
PLATELET # BLD AUTO: 168 K/UL (ref 150–400)
PMV BLD AUTO: 11 FL (ref 8.9–12.9)
POTASSIUM SERPL-SCNC: 3.9 MMOL/L (ref 3.5–5.1)
PROT SERPL-MCNC: 8.4 G/DL (ref 6.4–8.2)
PROT UR QL STRIP: NORMAL
PROT UR STRIP-MCNC: NEGATIVE MG/DL
RBC # BLD AUTO: 4.94 M/UL (ref 4.1–5.7)
RBC #/AREA URNS HPF: ABNORMAL /HPF (ref 0–5)
SAMPLES BEING HELD,HOLD: NORMAL
SODIUM SERPL-SCNC: 132 MMOL/L (ref 136–145)
SP GR UR REFRACTOMETRY: 1.03 (ref 1–1.03)
SP GR UR STRIP: 1.03 (ref 1–1.03)
UA UROBILINOGEN AMB POC: NORMAL (ref 0.2–1)
UA: UC IF INDICATED,UAUC: ABNORMAL
URINALYSIS CLARITY POC: CLEAR
URINALYSIS COLOR POC: YELLOW
URINE BLOOD POC: NORMAL
URINE LEUKOCYTES POC: NEGATIVE
URINE NITRITES POC: NEGATIVE
UROBILINOGEN UR QL STRIP.AUTO: 0.2 EU/DL (ref 0.2–1)
WBC # BLD AUTO: 7.9 K/UL (ref 4.1–11.1)
WBC URNS QL MICRO: ABNORMAL /HPF (ref 0–4)

## 2021-11-08 PROCEDURE — 99283 EMERGENCY DEPT VISIT LOW MDM: CPT

## 2021-11-08 PROCEDURE — G8536 NO DOC ELDER MAL SCRN: HCPCS | Performed by: INTERNAL MEDICINE

## 2021-11-08 PROCEDURE — 80053 COMPREHEN METABOLIC PANEL: CPT

## 2021-11-08 PROCEDURE — 81001 URINALYSIS AUTO W/SCOPE: CPT

## 2021-11-08 PROCEDURE — G8427 DOCREV CUR MEDS BY ELIG CLIN: HCPCS | Performed by: INTERNAL MEDICINE

## 2021-11-08 PROCEDURE — 74011250636 HC RX REV CODE- 250/636: Performed by: EMERGENCY MEDICINE

## 2021-11-08 PROCEDURE — G8752 SYS BP LESS 140: HCPCS | Performed by: INTERNAL MEDICINE

## 2021-11-08 PROCEDURE — 85025 COMPLETE CBC W/AUTO DIFF WBC: CPT

## 2021-11-08 PROCEDURE — G0463 HOSPITAL OUTPT CLINIC VISIT: HCPCS | Performed by: INTERNAL MEDICINE

## 2021-11-08 PROCEDURE — G8510 SCR DEP NEG, NO PLAN REQD: HCPCS | Performed by: INTERNAL MEDICINE

## 2021-11-08 PROCEDURE — 96375 TX/PRO/DX INJ NEW DRUG ADDON: CPT

## 2021-11-08 PROCEDURE — 74176 CT ABD & PELVIS W/O CONTRAST: CPT

## 2021-11-08 PROCEDURE — 96374 THER/PROPH/DIAG INJ IV PUSH: CPT

## 2021-11-08 PROCEDURE — 36415 COLL VENOUS BLD VENIPUNCTURE: CPT

## 2021-11-08 PROCEDURE — 1101F PT FALLS ASSESS-DOCD LE1/YR: CPT | Performed by: INTERNAL MEDICINE

## 2021-11-08 PROCEDURE — G8754 DIAS BP LESS 90: HCPCS | Performed by: INTERNAL MEDICINE

## 2021-11-08 PROCEDURE — G8417 CALC BMI ABV UP PARAM F/U: HCPCS | Performed by: INTERNAL MEDICINE

## 2021-11-08 PROCEDURE — 3017F COLORECTAL CA SCREEN DOC REV: CPT | Performed by: INTERNAL MEDICINE

## 2021-11-08 PROCEDURE — 99213 OFFICE O/P EST LOW 20 MIN: CPT | Performed by: INTERNAL MEDICINE

## 2021-11-08 PROCEDURE — 81002 URINALYSIS NONAUTO W/O SCOPE: CPT | Performed by: INTERNAL MEDICINE

## 2021-11-08 RX ORDER — HYDROCODONE BITARTRATE AND ACETAMINOPHEN 5; 325 MG/1; MG/1
1 TABLET ORAL
Qty: 20 TABLET | Refills: 0 | Status: SHIPPED | OUTPATIENT
Start: 2021-11-08 | End: 2021-11-13

## 2021-11-08 RX ORDER — KETOROLAC TROMETHAMINE 30 MG/ML
15 INJECTION, SOLUTION INTRAMUSCULAR; INTRAVENOUS
Status: COMPLETED | OUTPATIENT
Start: 2021-11-08 | End: 2021-11-08

## 2021-11-08 RX ORDER — ONDANSETRON 2 MG/ML
4 INJECTION INTRAMUSCULAR; INTRAVENOUS
Status: COMPLETED | OUTPATIENT
Start: 2021-11-08 | End: 2021-11-08

## 2021-11-08 RX ORDER — FENTANYL CITRATE 50 UG/ML
50 INJECTION, SOLUTION INTRAMUSCULAR; INTRAVENOUS
Status: DISCONTINUED | OUTPATIENT
Start: 2021-11-08 | End: 2021-11-08

## 2021-11-08 RX ORDER — TAMSULOSIN HYDROCHLORIDE 0.4 MG/1
0.4 CAPSULE ORAL DAILY
Qty: 30 CAPSULE | Refills: 0 | Status: SHIPPED | OUTPATIENT
Start: 2021-11-08 | End: 2021-11-30

## 2021-11-08 RX ORDER — CIPROFLOXACIN 500 MG/1
TABLET ORAL AS NEEDED
COMMUNITY
Start: 2021-11-04

## 2021-11-08 RX ADMIN — KETOROLAC TROMETHAMINE 15 MG: 30 INJECTION, SOLUTION INTRAMUSCULAR at 16:35

## 2021-11-08 RX ADMIN — SODIUM CHLORIDE 1000 ML: 9 INJECTION, SOLUTION INTRAVENOUS at 13:22

## 2021-11-08 RX ADMIN — ONDANSETRON 4 MG: 2 INJECTION INTRAMUSCULAR; INTRAVENOUS at 13:22

## 2021-11-08 NOTE — ED TRIAGE NOTES
Discussed with urology on-call recommends pain control and Flomax and to follow-up with clinic tomorrow morning. Patient remains well-appearing, tolerating p.o. Pain controlled at this time. Reports his PCP is already called in a prescription for Flomax and Norco the patient will  after discharge. Information for vision urology follow-up provided. Stable for discharge. Patient's results have been reviewed with them. Patient and/or family have verbally conveyed their understanding and agreement of the patient's signs, symptoms, diagnosis, treatment and prognosis and additionally agree to follow up as recommended or return to the Emergency Room should their condition change prior to follow-up. Discharge instructions have also been provided to the patient with some educational information regarding their diagnosis as well a list of reasons why they would want to return to the ER prior to their follow-up appointment should their condition change.

## 2021-11-08 NOTE — PROGRESS NOTES
Chief Complaint   Patient presents with    Flank Pain     1. Have you been to the ER, urgent care clinic since your last visit? Hospitalized since your last visit? yes- urgent care in [place], [place]    2. Have you seen or consulted any other health care providers outside of the [address] [place] [address] since your last visit? Include any pap smears or colon screening. Yes- urgent care in [place], [place].     Visit Vitals  /69   Pulse 83   Temp 98 °F (36.7 °C) (Temporal)   Resp 20   Ht 5' 11\" (1.803 m)   Wt 236 lb (107 kg)   SpO2 98%   BMI 32.92 kg/m²

## 2021-11-08 NOTE — ED PROVIDER NOTES
Please note that this dictation was completed with [employer], the computer voice recognition software.  Quite often unanticipated grammatical, syntax, homophones, and other interpretive errors are inadvertently transcribed by the computer software.  Please disregard these errors.  Please excuse any errors that have escaped final proofreading. 70-year-old male past medical history markable for arthralgia of the knee, type 2 diabetes, diverticular disease in the left colon, GERD, and gout presents ER complaining of \"flew to [place] last week developed some flank pain symptoms. Went to [facility] was told he had a UTI was started on Cipro. Since then been having intermittent right-sided sharp shooting pains. Feeling the pains get very intense at times associated with breaking into sweats nausea vomiting. Last episode was earlier this morning. Patient states he is currently not having \"that much pain right now, but it was really bad earlier. \" Patient denies previous episode of kidney stones but somebody told \"they thought I was having kidney stones when I was describing the episodes.     pt denies HA, vison changes, diff swallowing, CP, SOB, F/Ch, D/Cons or other current systemic complaints    Social/ PSH reviewed in EMR    EMR Chart Reviewed               Past Medical History:   Diagnosis Date    Arthralgia of knee 7/15/2013    Diabetes ([address]     type 2    Diverticular disease of left colon 3/22/2013    GERD (gastroesophageal reflux disease)     Gout 12/6/1985       Past Surgical History:   Procedure Laterality Date    HX COLONOSCOPY  2000    Dr. Olivia [facility]ney [place] [facility]      deviated septum age 12    HX ORTHOPAEDIC  1974    right knee    HX OTHER SURGICAL  1/21/16    excision of lymph node right axilla    HX TONSILLECTOMY      child         Family History:   Problem Relation Age of Onset    Mental Retardation Mother     Cancer Father         lung    Diabetes Maternal Grandmother     Diabetes Paternal Grandmother        Social History     Socioeconomic History    Marital status:      Spouse name: Not on file    Number of children: Not on file    Years of education: Not on file    Highest education level: Not on file   Occupational History    Not on file   Tobacco Use    Smoking status: Never Smoker    Smokeless tobacco: Never Used   Vaping Use    Vaping Use: Never used   Substance and Sexual Activity    Alcohol use: Yes     Comment: 1 beer per month    Drug use: No    Sexual activity: Yes     Partners: Female   Other Topics Concern    Not on file   Social History Narrative    Not on file     Social Determinants of Health     Financial Resource Strain:     Difficulty of Paying Living Expenses: Not on file   Food Insecurity:     Worried About Running Out of Food in the Last Year: Not on file    Roxanna of Food in the Last Year: Not on file   Transportation Needs:     Lack of Transportation (Medical): Not on file    Lack of Transportation (Non-Medical): Not on file   Physical Activity:     Days of Exercise per Week: Not on file    Minutes of Exercise per Session: Not on file   Stress:     Feeling of Stress : Not on file   Social Connections:     Frequency of Communication with Friends and Family: Not on file    Frequency of Social Gatherings with Friends and Family: Not on file    Attends Orthodox Services: Not on file    Active Member of [address] [place] [address] [employer] or Organizations: Not on file    Attends Club or Organization Meetings: Not on file    Marital Status: Not on file   Intimate Partner Violence:     Fear of Current or Ex-Partner: Not on file    Emotionally Abused: Not on file    Physically Abused: Not on file    Sexually Abused: Not on file   Housing Stability:     Unable to Pay for Housing in the Last Year: Not on file    Number of [place] in the Last Year: Not on file    Unstable Housing in the Last Year: Not on file         ALLERGIES: Patient has no known allergies.     Review of Systems   Constitutional: Negative for chills and fever. HENT: Negative for drooling, trouble swallowing and voice change. Respiratory: Negative for chest tightness and shortness of breath. Cardiovascular: Negative for palpitations and leg swelling. Gastrointestinal: Positive for abdominal pain, nausea and vomiting. Genitourinary: Positive for dysuria, flank pain and hematuria. Musculoskeletal: Negative for back pain. Skin: Negative for rash. Neurological: Negative for facial asymmetry and speech difficulty. All other systems reviewed and are negative. Vitals:    11/08/21 1301   BP: 132/75   Pulse: 97   Resp: 12   Temp: 98.3 °F (36.8 °C)   SpO2: 97%            Physical Exam  Vitals and nursing note reviewed. Constitutional:       General: He is not in acute distress. Appearance: Normal appearance. He is well-developed. He is obese. He is not ill-appearing, toxic-appearing or diaphoretic. HENT:      Head: Normocephalic and atraumatic. Right Ear: External ear normal.      Left Ear: External ear normal.      Mouth/Throat:      Pharynx: No oropharyngeal exudate. Eyes:      General: No scleral icterus. Right eye: No discharge. Left eye: No discharge. Extraocular Movements: Extraocular movements intact. Conjunctiva/sclera: Conjunctivae normal.      Pupils: Pupils are equal, round, and reactive to light. Cardiovascular:      Rate and Rhythm: Normal rate and regular rhythm. Pulses: Normal pulses. Heart sounds: Normal heart sounds. No murmur heard. No friction rub. No gallop. Pulmonary:      Effort: Pulmonary effort is normal. No respiratory distress. Breath sounds: Normal breath sounds. No stridor. No wheezing, rhonchi or rales. Chest:      Chest wall: No tenderness. Abdominal:      General: Bowel sounds are normal. There is no distension. Palpations: Abdomen is soft. There is no mass. Tenderness:  There is no abdominal tenderness. There is no guarding or rebound. Hernia: No hernia is present. Comments: nttp    Neg peritoneal signs; Genitourinary:     Comments: Pt denies urinary/ Testicular/ scrotal or penile  complaints  Musculoskeletal:         General: No swelling, tenderness, deformity or signs of injury. Normal range of motion. Cervical back: Normal range of motion and neck supple. Right lower leg: No edema. Lymphadenopathy:      Cervical: No cervical adenopathy. Skin:     General: Skin is warm and dry. Capillary Refill: Capillary refill takes less than 2 seconds. Findings: No bruising, erythema, lesion or rash. Neurological:      General: No focal deficit present. Mental Status: He is alert and oriented to person, place, and time. Cranial Nerves: No cranial nerve deficit. Motor: No weakness. Coordination: Coordination normal.      Gait: Gait normal.      Deep Tendon Reflexes: Reflexes normal.          MDM       Procedures    2:55 PM  Change of shift. Care of patient signed over to Dr Marthenia Cooks. Bedside handoff complete.  Awaiting results;

## 2021-11-08 NOTE — PROGRESS NOTES
Chief Complaint   Patient presents with    Flank Pain     This is a 78 y.o. white male that started with flank pains last week. He was on his way to [place] with his wife where she was going to be seen at [facility] for followup of her lymphoma. He had some blood in his urine. He was seen at a [facility] in [place]. They told him he had a possible infection. They put him on Cipro. He does not remember the dose. He says it really has not helped. He continues to have right flank pain. His urine has cleared. He has been trying to hydrate. He has no history of nephrolithiasis. He has no family history of nephrolithiasis. He has never had hypercalcemia. His blood pressure was as noted. He was not tachycardic. His chest was clear. He was noted to have CVA tenderness on the right and no abdominal tenderness and he had hematuria on the urine. Impression:  Gross hematuria, flank pain. Suspect kidney stone. He needs a stat CT. He is very uncomfortable. He lives 60 miles from here. I suggest that he go to ER now and get a CAT scan and get evaluated. Hydrocodone 5-325 would be appropriate for pain for this acute episode and I also gave him some Flomax 0.4 one daily. He may need to see Urology.     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    Borderline hypertension    Lymphadenopathy    Dyslipidemia (high LDL; low HDL)    Asthma    Arthralgia of knee    Diabetes mellitus ([address]    Diverticular disease of left colon    BPH (benign prostatic hyperplasia)    ED (erectile dysfunction)    Gout     Pain free       Vitals:    11/08/21 1118   BP: 120/69   Pulse: 83   Resp: 20   Temp: 98 °F (36.7 °C)   TempSrc: Temporal   SpO2: 98%   Weight: 236 lb (107 kg)   Height: 5' 11\" (1.803 m)     Results for orders placed or performed in visit on 10/21/21   MICROALBUMIN, UR, RAND W/ MICROALB/CREAT RATIO   Result Value Ref Range    Microalbumin,urine random 3.22 MG/DL    Creatinine, urine 124.00 mg/dL    Microalbumin/Creat ratio (mg/g creat) 26 0 - 30 mg/g   METABOLIC PANEL, COMPREHENSIVE   Result Value Ref Range    Sodium 136 136 - 145 mmol/L    Potassium 4.1 3.5 - 5.1 mmol/L    Chloride 102 97 - 108 mmol/L    CO2 27 21 - 32 mmol/L    Anion gap 7 5 - 15 mmol/L    Glucose 189 (H) 65 - 100 mg/dL    BUN 15 6 - 20 MG/DL    Creatinine 1.26 0.70 - 1.30 MG/DL    BUN/Creatinine ratio 12 12 - 20      GFR est AA >60 >60 ml/min/1.73m2    GFR est non-AA 57 (L) >60 ml/min/1.73m2    Calcium 9.4 8.5 - 10.1 MG/DL    Bilirubin, total 0.9 0.2 - 1.0 MG/DL    ALT (SGPT) 48 12 - 78 U/L    AST (SGOT) 38 (H) 15 - 37 U/L    Alk. phosphatase 66 45 - 117 U/L    Protein, total 7.2 6.4 - 8.2 g/dL    Albumin 4.0 3.5 - 5.0 g/dL    Globulin 3.2 2.0 - 4.0 g/dL    A-G Ratio 1.3 1.1 - 2.2     LIPID PANEL   Result Value Ref Range    Cholesterol, total 114 <200 MG/DL    Triglyceride 142 <150 MG/DL    HDL Cholesterol 39 MG/DL    LDL, calculated 46.6 0 - 100 MG/DL    VLDL, calculated 28.4 MG/DL    CHOL/HDL Ratio 2.9 0.0 - 5.0     HEMOGLOBIN A1C WITH EAG   Result Value Ref Range    Hemoglobin A1c 7.3 (H) 4.0 - 5.6 %    Est. average glucose 163 mg/dL   Diagnoses and all orders for this visit:    1. Dysuria  -     AMB POC URINALYSIS DIP STICK MANUAL W/O MICRO    2. Nephrolithiasis  -     AMB POC URINALYSIS DIP STICK MANUAL W/O MICRO  -     REFERRAL TO EMERGENCY DEPARTMENT  -     HYDROcodone-acetaminophen (NORCO) 5-325 mg per tablet; Take 1 Tablet by mouth every four (4) hours as needed for Pain for up to 5 days. Max Daily Amount: 6 Tablets. -     AMB POC URINALYSIS DIP STICK AUTO W/O MICRO    Other orders  -     tamsulosin (Flomax) 0.4 mg capsule; Take 1 Capsule by mouth daily.

## 2021-11-08 NOTE — ED TRIAGE NOTES
Triage: Pt advises that he has  Been having lower right back pain since wed. Pt was seen by pcp and was sent here for ct scan. Pcp advises of possible kidney stone.

## 2021-11-30 RX ORDER — TAMSULOSIN HYDROCHLORIDE 0.4 MG/1
CAPSULE ORAL
Qty: 30 CAPSULE | Refills: 0 | Status: SHIPPED | OUTPATIENT
Start: 2021-11-30 | End: 2022-01-13

## 2021-12-03 ENCOUNTER — OFFICE VISIT (OUTPATIENT)
Dept: ORTHOPEDIC SURGERY | Age: 66
End: 2021-12-03
Payer: MEDICARE

## 2021-12-03 VITALS — HEIGHT: 71 IN | BODY MASS INDEX: 31.5 KG/M2 | WEIGHT: 225 LBS

## 2021-12-03 DIAGNOSIS — M25.562 CHRONIC PAIN OF BOTH KNEES: ICD-10-CM

## 2021-12-03 DIAGNOSIS — M25.561 CHRONIC PAIN OF BOTH KNEES: ICD-10-CM

## 2021-12-03 DIAGNOSIS — M17.12 PRIMARY OSTEOARTHRITIS OF LEFT KNEE: ICD-10-CM

## 2021-12-03 DIAGNOSIS — M17.11 PRIMARY OSTEOARTHRITIS OF RIGHT KNEE: Primary | ICD-10-CM

## 2021-12-03 DIAGNOSIS — G89.29 CHRONIC PAIN OF BOTH KNEES: ICD-10-CM

## 2021-12-03 PROCEDURE — 3017F COLORECTAL CA SCREEN DOC REV: CPT | Performed by: ORTHOPAEDIC SURGERY

## 2021-12-03 PROCEDURE — 1101F PT FALLS ASSESS-DOCD LE1/YR: CPT | Performed by: ORTHOPAEDIC SURGERY

## 2021-12-03 PROCEDURE — 99204 OFFICE O/P NEW MOD 45 MIN: CPT | Performed by: ORTHOPAEDIC SURGERY

## 2021-12-03 PROCEDURE — G8417 CALC BMI ABV UP PARAM F/U: HCPCS | Performed by: ORTHOPAEDIC SURGERY

## 2021-12-03 PROCEDURE — G8536 NO DOC ELDER MAL SCRN: HCPCS | Performed by: ORTHOPAEDIC SURGERY

## 2021-12-03 PROCEDURE — G8427 DOCREV CUR MEDS BY ELIG CLIN: HCPCS | Performed by: ORTHOPAEDIC SURGERY

## 2021-12-03 PROCEDURE — G8756 NO BP MEASURE DOC: HCPCS | Performed by: ORTHOPAEDIC SURGERY

## 2021-12-03 PROCEDURE — G8432 DEP SCR NOT DOC, RNG: HCPCS | Performed by: ORTHOPAEDIC SURGERY

## 2021-12-03 NOTE — PROGRESS NOTES
Estelle Schaumann (: 1955) is a 78 y.o. male, patient, here for evaluation of the following chief complaint(s):  Knee Pain (bilateral knee pain)       SUBJECTIVE/OBJECTIVE:  Estelle Schaumann presents today complaining of bilateral knee pain, right slightly worse than left. Waxing and waning symptoms for many years. Gradually progressive. Once he gets moving in the morning he does okay, but as the day goes on he has more more pain that is limiting all of his activities. He is no longer able to walk with his wife for exercise. Has significant difficulty with stairs. Occasional wakening night pain on the right side. Mechanical symptoms without falls. He has tried an  brace in the past which did not help. Remote history of corticosteroid injections which did not help. He was recently on a course of ibuprofen for a kidney stone which provided moderate relief. Tylenol does not help. He is very unhappy with progressive pain and dysfunction, declining quality of life. PHYSICAL EXAM:  Vitals: Ht 5' 11\" (1.803 m)   Wt 225 lb (102.1 kg)   BMI 31.38 kg/m²   Body mass index is 31.38 kg/m². 78y.o. year old M, no distress. Mild limp on the right. Pain-free motion both hips. Negative Stinchfield. Both knees are in varus. No effusion on the right. Medial joint line tenderness. Crepitus with motion, 5 to approximately 115 degrees with significant posterior pain at limit. Crepitus with motion. No instability. No effusion on the left. Mild medial joint line tenderness. Full extension with flexion to approximately 120 or 125 degrees. No instability. Symmetrical palpable distal pulses. No gross motor or sensory deficits in lower extremities. No distal edema.     IMAGING:  Radiographs: XR Results (most recent):  Results from Appointment encounter on 21    XR KNEES BI MIN 4 V    Narrative  4 x-ray views of both knees including AP and PA flexion, lateral, sunrise demonstrate complete loss of medial joint space in both knees on weightbearing PA flexion view. Marginal osteophytes in both patellofemoral compartments. ASSESSMENT/PLAN:  1. Primary osteoarthritis of right knee  2. Primary osteoarthritis of left knee  3. Chronic pain of both knees  -     XR KNEES BI MIN 4 V; Future    The xray and exam findings were discussed with the patient today. Severe osteoarthritis of both knees, right slightly more symptomatic than left. We discussed continued conservative treatment measures versus right total knee replacement. Symptoms have progressed despite comprehensive conservative treatment measures outlined above and he desires to proceed with right total knee replacement. We discussed risks, benefits, and alternatives in detail, as well as anticipated hospital stay and course of rehabilitation. He will see Dr. Karin Cockayne prior to surgery. All questions answered. Plan to use IV tranexamic acid intraoperatively, aspirin for DVT prophylaxis. Return for surgery. Review Of Systems  ROS     Positive for: Musculoskeletal    Last edited by Paco Toscano RN on 12/3/2021  9:06 AM. (History)         Patient denies any recent fever, chills, nausea, vomiting, chest pain, or shortness of breath. No Known Allergies    Current Outpatient Medications   Medication Sig    tamsulosin (FLOMAX) 0.4 mg capsule TAKE 1 CAPSULE BY MOUTH EVERY DAY    ciprofloxacin HCl (CIPRO) 500 mg tablet TAKE 1 TABLET BY MOUTH TWICE DAILY FOR 7 DAYS    lisinopril-hydroCHLOROthiazide (PRINZIDE, ZESTORETIC) 10-12.5 mg per tablet Take 1 Tablet by mouth daily.  doxycycline (VIBRAMYCIN) 100 mg capsule TAKE 1 CAPSULE BY MOUTH EVERY DAY    metFORMIN ER (GLUCOPHAGE XR) 500 mg tablet TAKE 3 TABLETS BY MOUTH EVERY DAY WITH DINNER    sildenafil citrate (Viagra) 100 mg tablet Take 1 Tab by mouth as needed for Erectile Dysfunction.     atorvastatin (LIPITOR) 10 mg tablet TAKE 1 TABLET BY MOUTH EVERY DAY    aspirin delayed-release 81 mg tablet Take 81 mg by mouth nightly.  ibuprofen (MOTRIN) 200 mg tablet Take 800 mg by mouth as needed. No current facility-administered medications for this visit. Past Medical History:   Diagnosis Date    Arthralgia of knee 7/15/2013    Diabetes ([address]     type 2    Diverticular disease of left colon 3/22/2013    GERD (gastroesophageal reflux disease)     Gout 12/6/1985        Past Surgical History:   Procedure Laterality Date    HX COLONOSCOPY  2000    Dr. Molly Gzuman [place] HX HEENT      deviated septum age 12    HX ORTHOPAEDIC  1974    right knee    HX OTHER SURGICAL  1/21/16    excision of lymph node right axilla    HX TONSILLECTOMY      child       Family History   Problem Relation Age of Onset    Mental Retardation Mother     Cancer Father         lung    Diabetes Maternal Grandmother     Diabetes Paternal Grandmother         Social History     Socioeconomic History    Marital status:      Spouse name: Not on file    Number of children: Not on file    Years of education: Not on file    Highest education level: Not on file   Occupational History    Not on file   Tobacco Use    Smoking status: Never Smoker    Smokeless tobacco: Never Used   Vaping Use    Vaping Use: Never used   Substance and Sexual Activity    Alcohol use: Yes     Comment: 1 beer per month    Drug use: No    Sexual activity: Yes     Partners: Female   Other Topics Concern    Not on file   Social History Narrative    Not on file     Social Determinants of Health     Financial Resource Strain:     Difficulty of Paying Living Expenses: Not on file   Food Insecurity:     Worried About Running Out of Food in the Last Year: Not on file    Roxanna of Food in the Last Year: Not on file   Transportation Needs:     Lack of Transportation (Medical): Not on file    Lack of Transportation (Non-Medical):  Not on file   Physical Activity:     Days of Exercise per Week: Not on file    Minutes of Exercise per Session: Not on file   Stress:     Feeling of Stress : Not on file   Social Connections:     Frequency of Communication with Friends and Family: Not on file    Frequency of Social Gatherings with Friends and Family: Not on file    Attends Alev[employer] Services: Not on file    Active Member of [address] [place] [address] or Organizations: Not on file    Attends Club or Organization Meetings: Not on file    Marital Status: Not on file   Intimate Partner Violence:     Fear of Current or Ex-Partner: Not on file    Emotionally Abused: Not on file    Physically Abused: Not on file    Sexually Abused: Not on file   Housing Stability:     Unable to Pay for Housing in the Last Year: Not on file    Number of [place] in the Last Year: Not on file    Unstable Housing in the Last Year: Not on file       Orders Placed This Encounter    XR KNEES BI MIN 4 V     rooom [id number]     Standing Status:   Future     Number of Occurrences:   1     Standing Expiration Date:   12/4/2022        An electronic signature was used to authenticate this note.   -- Fouzia Becerra MD

## 2021-12-09 DIAGNOSIS — M17.11 PRIMARY OSTEOARTHRITIS OF RIGHT KNEE: Primary | ICD-10-CM

## 2021-12-29 ENCOUNTER — TRANSCRIBE ORDER (OUTPATIENT)
Dept: REGISTRATION | Age: 66
End: 2021-12-29

## 2021-12-29 DIAGNOSIS — Z01.812 PRE-PROCEDURE LAB EXAM: Primary | ICD-10-CM

## 2022-01-13 ENCOUNTER — HOSPITAL ENCOUNTER (OUTPATIENT)
Dept: PREADMISSION TESTING | Age: 67
Discharge: HOME | End: 2022-01-13
Payer: MEDICARE

## 2022-01-13 VITALS
WEIGHT: 228.18 LBS | HEART RATE: 74 BPM | TEMPERATURE: 98 F | DIASTOLIC BLOOD PRESSURE: 78 MMHG | HEIGHT: 71 IN | RESPIRATION RATE: 16 BRPM | SYSTOLIC BLOOD PRESSURE: 130 MMHG | OXYGEN SATURATION: 97 % | BODY MASS INDEX: 31.94 KG/M2

## 2022-01-13 LAB
ABO + RH BLD: NORMAL
ANION GAP SERPL CALC-SCNC: 7 MMOL/L (ref 5–15)
APPEARANCE UR: CLEAR
ATRIAL RATE: 69 BPM
BACTERIA URNS QL MICRO: NEGATIVE /HPF
BILIRUB UR QL: NEGATIVE
BLOOD GROUP ANTIBODIES SERPL: NORMAL
BUN SERPL-MCNC: 24 MG/DL (ref 6–20)
BUN/CREAT SERPL: 18 (ref 12–20)
CALCIUM SERPL-MCNC: 9.2 MG/DL (ref 8.5–10.1)
CALCULATED P AXIS, ECG09: 16 DEGREES
CALCULATED R AXIS, ECG10: -33 DEGREES
CALCULATED T AXIS, ECG11: 29 DEGREES
CHLORIDE SERPL-SCNC: 102 MMOL/L (ref 97–108)
CO2 SERPL-SCNC: 26 MMOL/L (ref 21–32)
COLOR UR: NORMAL
CREAT SERPL-MCNC: 1.36 MG/DL (ref 0.7–1.3)
DIAGNOSIS, 93000: NORMAL
EPITH CASTS URNS QL MICRO: NORMAL /LPF
ERYTHROCYTE [DISTWIDTH] IN BLOOD BY AUTOMATED COUNT: 14.6 % (ref 11.5–14.5)
EST. AVERAGE GLUCOSE BLD GHB EST-MCNC: 131 MG/DL
GLUCOSE SERPL-MCNC: 154 MG/DL (ref 65–100)
GLUCOSE UR STRIP.AUTO-MCNC: NEGATIVE MG/DL
HBA1C MFR BLD: 6.2 % (ref 4–5.6)
HCT VFR BLD AUTO: 37.4 % (ref 36.6–50.3)
HGB BLD-MCNC: 12.8 G/DL (ref 12.1–17)
HGB UR QL STRIP: NEGATIVE
HYALINE CASTS URNS QL MICRO: NORMAL /LPF (ref 0–5)
INR PPP: 1 (ref 0.9–1.1)
KETONES UR QL STRIP.AUTO: NEGATIVE MG/DL
LEUKOCYTE ESTERASE UR QL STRIP.AUTO: NEGATIVE
MCH RBC QN AUTO: 31 PG (ref 26–34)
MCHC RBC AUTO-ENTMCNC: 34.2 G/DL (ref 30–36.5)
MCV RBC AUTO: 90.6 FL (ref 80–99)
NITRITE UR QL STRIP.AUTO: NEGATIVE
NRBC # BLD: 0 K/UL (ref 0–0.01)
NRBC BLD-RTO: 0 PER 100 WBC
P-R INTERVAL, ECG05: 194 MS
PH UR STRIP: 5 [PH] (ref 5–8)
PLATELET # BLD AUTO: 132 K/UL (ref 150–400)
PMV BLD AUTO: 11.2 FL (ref 8.9–12.9)
POTASSIUM SERPL-SCNC: 4.2 MMOL/L (ref 3.5–5.1)
PROT UR STRIP-MCNC: NEGATIVE MG/DL
PROTHROMBIN TIME: 10.6 SEC (ref 9–11.1)
Q-T INTERVAL, ECG07: 408 MS
QRS DURATION, ECG06: 114 MS
QTC CALCULATION (BEZET), ECG08: 437 MS
RBC # BLD AUTO: 4.13 M/UL (ref 4.1–5.7)
RBC #/AREA URNS HPF: NORMAL /HPF (ref 0–5)
SODIUM SERPL-SCNC: 135 MMOL/L (ref 136–145)
SP GR UR REFRACTOMETRY: 1.02 (ref 1–1.03)
SPECIMEN EXP DATE BLD: NORMAL
UA: UC IF INDICATED,UAUC: NORMAL
UROBILINOGEN UR QL STRIP.AUTO: 0.2 EU/DL (ref 0.2–1)
VENTRICULAR RATE, ECG03: 69 BPM
WBC # BLD AUTO: 6 K/UL (ref 4.1–11.1)
WBC URNS QL MICRO: NORMAL /HPF (ref 0–4)

## 2022-01-13 PROCEDURE — 86900 BLOOD TYPING SEROLOGIC ABO: CPT

## 2022-01-13 PROCEDURE — 83036 HEMOGLOBIN GLYCOSYLATED A1C: CPT

## 2022-01-13 PROCEDURE — 81001 URINALYSIS AUTO W/SCOPE: CPT

## 2022-01-13 PROCEDURE — 93005 ELECTROCARDIOGRAM TRACING: CPT

## 2022-01-13 PROCEDURE — 85610 PROTHROMBIN TIME: CPT

## 2022-01-13 PROCEDURE — 80048 BASIC METABOLIC PNL TOTAL CA: CPT

## 2022-01-13 PROCEDURE — 85027 COMPLETE CBC AUTOMATED: CPT

## 2022-01-13 NOTE — PERIOP NOTES
[address]  ORTHOPAEDIC    Surgery Date:   1/25/2021    [facility] staff will call you between 4 PM- 8 PM the day before surgery with your arrival time. If your surgery is on a Monday, we will call you the preceding Friday. Please call [phone] after 8 PM if you did not receive your arrival time. 1. Please report to [facility] Patient Access/Admitting on the 1st floor. Bring your insurance card, photo identification, and any copayment ( if applicable). 2. If you are going home the same day of your surgery, you must have a responsible adult to drive you home. You need to have a responsible adult to stay with you the first 24 hours after surgery and you should not drive a car for 24 hours following your surgery. 3. Do NOT eat any solid foods after midnight the night before surgery including candy, mints or gum. You may drink clear liquids from midnight until 1 hour prior to arrival time. You may drink up to 12 ounces at one time every 4 hours. 4. Do NOT drink alcohol or smoke 24 hours before surgery. STOP smoking for 14 days prior as it helps with breathing and healing after surgery. 5. If your arrival time is 3pm or later, you may eat a light breakfast before 8am (toast, bagel-no butter, black coffee, plain tea, fruit juice-no pulp) Please note special instructions, if applicable, below for medications. 6. If you are being admitted to the hospital,please leave personal belongings/luggage in your car until you have an assigned hospital room number. 7. Please wear comfortable clothes. Wear your glasses instead of contacts. We ask that all money, jewelry and valuables be left at home. Wear no make up, particularly mascara, the day of surgery. 8.  All body piercings, rings, and jewelry need to be removed and left at home. Please remove any nail polish or artifical nails from your fingernails. Please wear your hair loose or down.  Please no pony-tails, buns, or any metal hair accessories. If you shower the morning of surgery, please do not apply any lotions or powders afterwards. You may wear deodorant. Do not shave any body area within 24 hours of your surgery. 9. Please follow all instructions to avoid any potential surgical cancellation. 10. Should your physical condition change, (i.e. fever, cold, flu, etc.) please notify your surgeon as soon as possible. 11. It is important to be on time. If a situation occurs where you may be delayed, please call:  [phone] / [phone] on the day of surgery. 12. The Preadmission Testing staff can be reached at [phone]. 13. Special instructions: NONE    Current Outpatient Medications   Medication Sig    ciprofloxacin HCl (CIPRO) 500 mg tablet as needed (FOR DIVERTICULITIS FLARE UPS).  lisinopril-hydroCHLOROthiazide (PRINZIDE, ZESTORETIC) 10-12.5 mg per tablet Take 1 Tablet by mouth daily.  doxycycline (VIBRAMYCIN) 100 mg capsule TAKE 1 CAPSULE BY MOUTH EVERY DAY (Patient taking differently: nightly.)    metFORMIN ER (GLUCOPHAGE XR) 500 mg tablet TAKE 3 TABLETS BY MOUTH EVERY DAY WITH DINNER    sildenafil citrate (Viagra) 100 mg tablet Take 1 Tab by mouth as needed for Erectile Dysfunction.  atorvastatin (LIPITOR) 10 mg tablet TAKE 1 TABLET BY MOUTH EVERY DAY    aspirin delayed-release 81 mg tablet Take 81 mg by mouth nightly.  ibuprofen (MOTRIN) 200 mg tablet Take 800 mg by mouth as needed. No current facility-administered medications for this encounter. 1. YOU MUST ONLY TAKE THESE MEDICATIONS THE MORNING OF SURGERY WITH A SIP OF WATER: NONE  2. MEDICATIONS TO TAKE THE MORNING OF SURGERY ONLY IF NEEDED: TYLENOL  3. HOLD these medications BEFORE Surgery: IBUPROFEN- 3 DAYS PRIOR TO SURGERY. METFORMIN- 1 DAY PRIOR TO SURGERY, VIAGRA-1 DAY BEFORE SURGERY  4. Ask your surgeon/prescribing physician about when/if to STOP taking these medications: NONE  5.  Stop any non-steroidal anti-inflammatory drugs (i.e. Ibuprofen, Naproxen, Advil, Aleve)  3  days before surgery. You may take Tylenol. STOP all vitamins and herbal supplements 1 week prior to  surgery. 6. If you are currently taking Plavix, Coumadin, or any other blood-thinning/anticoagulant medication contact your prescribing physician for instructions. Preventing Infections Before and After  Your Surgery    IMPORTANT INSTRUCTIONS    Please read and follow these instructions carefully. If you are unable to comply with the below instructions your procedure will be cancelled. You play an important role in your health and preparation for surgery. To reduce the germs on your skin you will need to shower with CHG soap (Chorhexidine gluconate 4%) two times before surgery. CHG soap (Hibiclens, Hex-A-Clens or store brand)   CHG soap will be provided at your Preadmission Testing (PAT) appointment.  If you do not have a PAT appointment before surgery, you may arrange to  CHG soap from our office or purchase CHG soap at a pharmacy, grocery or department store.  You need to purchase TWO 4 ounce bottles to use for your 2 showers. Steps to follow:  1. Wash your hair with your normal shampoo and your body with regular soap and rinse well to remove shampoo and soap from your skin. 2. Wet a clean washcloth and turn off the shower. 3. Put CHG soap on washcloth and apply to your entire body from the neck down. Do not use on your head, face or private parts(genitals). Do not use CHG soap on open sores, wounds or areas of skin irritation. 4. Wash you body gently for 5 minutes. Do not wash your skin too hard. This soap does not create lather. Pay special attention to your underarms and from your belly button to your feet. 5. Turn the shower back on and rinse well to get CHG soap off your body. 6. Pat your skin dry with a clean, dry towel. Do not apply lotions or moisturizer.   7. Put on clean clothes and sleep on fresh bed sheets and do not allow pets to sleep with you. Shower with CHG soap 2 times before your surgery   The evening before your surgery   The morning of your surgery      Tips to help prevent infections after your surgery:  1. Protect your surgical wound from germs:  ? Hand washing is the most important thing you and your caregivers can do to prevent infections. ? Keep your bandage clean and dry! ? Do not touch your surgical wound. 2. Use clean, freshly washed towels and washcloths every time you shower; do not share bath linens with others. 3. Until your surgical wound is healed, wear clothing and sleep on bed linens each day that are clean and freshly washed. 4. Do not allow pets to sleep in your bed with you or touch your surgical wound. 5. Do not smoke  smoking delays wound healing. This may be a good time to stop smoking. 6. If you have diabetes, it is important for you to manage your blood sugar levels properly before your surgery as well as after your surgery. Poorly managed blood sugar levels slow down wound healing and prevent you from healing completely. Prevention of Infection  Testing for Staphylococcus aureus on your skin before surgery    Staphylococcus aureus (staph) is a common bacteria that is found on the body. It normally does not cause infection on healthy skin. Before surgery, you will be tested to see if you have staph by swabbing the inside of your nose. When you have an incision with surgery, the goal is to protect that incision from infection. Removal of the staph bacteria before surgery can decrease the risk of a surgical site infection. If your nose swab is positive for staph you will be called. Your treatment will include 2 steps:   Prescription for Mupirocin ointment to be used in each nostril twice a day for 5 days.  Showering with Chlorhexidine (CHG) liquid soap for 5 days prior to surgery. How to use Mupirocin ointment in your nose  1.  the prescription from your pharmacy.  You will receive a large tube of ointment which will be big enough for all of your treatments. You will apply this ointment to each nostril 2 times a day for 5 days. 2. Wash your hands with  gel or soap and water for 20 seconds before using ointment. 3. Place a pea-sized amount of ointment on a cotton Q-tip. 4. Apply ointment just inside of each nostril with the Q-tip. Do not push Q-tip or ointment deep inside you nose. 5. Press your nostrils together and massage for a few seconds. 6. Wash your hands with  gel or soap and water after you are finished. 7. Do not get ointment near your eyes. If it gets into your eyes, rinse them with cool water. 8. If you need to use nasal spray, clean the tip of the bottle with alcohol before use and do not use both at the same time. 9. If you are scheduled for COVID testing during the 5 days, do NOT apply morning dose until after the COVID test has been performed. How to use Chlorhexidine (CHG) 4% liquid soap  1. Purchase an 8 ounce bottle of CHG liquid soap (Chlorhexidine 4%, Hibiclens, Hex-A-Clens or store brand) at a pharmacy or grocery store. 2. Wash your hair with your normal shampoo and your body with regular soap and rinse well to remove shampoo and soap from your skin. 3. Wet a clean washcloth and turn off the shower. 4. Put CHG soap on washcloth and apply to your entire body from the neck down. Do not use on your head, face or private parts(genitals). Do not use CHG soap on open sores, wounds or areas of skin irritation. 5. Wash your body gently for 5 minutes. Do not wash your skin too hard. This soap does not create lather. Pay special attention to your underarms and from your belly button to your feet. 6. Turn the shower back on and rinse well to get CHG soap off your body. 7. Pat your skin dry with a clean, dry towel. Do not apply lotions or moisturizer. 8. Put on clean clothes and sleep on fresh bed sheets the night before surgery.  Do not allow pets to sleep with you. Eating and Drinking Before Surgery     You may eat a regular dinner at the usual time on the day before your surgery.  Do NOT eat any solid foods after midnight unless your arrival time at the hospital is 3pm or later.  You may drink clear liquids only from 12 midnight until 1 hours prior to your arrival time at the hospital on the day of your surgery. Do NOT drink alcohol.  Clear liquids include:  o Water  o Fruit juices without pulp( i.e. apple juice)  o Carbonated beverages  o Black coffee (no cream/milk)  o Tea (no cream/milk)  o Gatorade   You may drink up to 12-16 ounces at one time every 4 hours between the hours of midnight and 1 hour before your arrival time at the hospital. Example- if your arrival time at the hospital is 6am, you may drink 12-16 ounces of clear liquids no later than 5am.   If your arrival time at the hospital is 3pm or later, you may eat a light breakfast before 8am.   A light breakfast includes:  o Toast or bagel (no butter)  o Black coffee (no cream/milk)  o Tea (no cream/milk)  o Fruit juices without pulp ( i.e. apple juice)  o Do NOT eat meat, eggs, vegetables or fruit   If you have any questions, please contact your surgeon's office. [facility]   Instructions for Pre-Surgery COVID-19 Testing     Across our ministry we have established standard guidelines to ensure the health and safety of our patients, residents and associates as we resume elective services for patients. All patients presenting for surgery are required to have a COVID-19 test result within 96 hours of their scheduled surgery. [facility] is providing this test free of charge to the patient.    Instructions for COVID-19 Testing:     Patients will receive a call from Juliet Lowry 4-5 days prior to surgery to schedule a date and time to come to the [address] [place] [address] Drive for their COVID-19 test   Patients are advised to self-quarantine after testing until their scheduled surgery   Once on site, patients will be registered and receive COVID test in their vehicle   If a patient is scheduled for normal Pre Admission Testing 96 hours from date of surgery, the patient will still have their COVID test done at the [address] [place] [address] located at [address] [place] [address] Positive results will be shared with the surgeon and anesthesiologist and may result in cancellation of the elective procedure    Testing Hours and Location:   Address:  [address] [place] [address] Rd Admission 11 [facility] in the Discharge Lot on [facility] (Map Attached)  [address] [employer], [address]   Hours: Monday- Friday 7a-3p    PAT Phone Number: [phone]            Patient Information Regarding COVID Restrictions    Patients are advised to self-quarantine after COVID testing up to the day of the scheduled procedure. Day of Procedure     Please park in the parking deck or any designated visitor parking lot.  Enter the facility through the Main Entrance of the hospital.   A temperature check and appropriate symptom/exposure screening will be done prior to entry to the facility.  On the day of surgery, please provide the cell phone number of the person who will be waiting for you to the Patient Access representative at the time of registration.  Please wear a mask on the day of your procedure.  We are now allowing one designated visitor per stay. Pediatric patients may have 2 designated visitors. This one person may come in with you on the day of your procedure.  No visitors under the age of 13.  The designated visitor must also wear a mask.  Once your procedure and the immediate recovery period is completed, a nurse in the recovery area will contact your designated visitor to inform them of your room number or to otherwise review other pertinent information regarding your care.      Social distancing practices are to be adhered to in waiting areas and the cafeteria. The patient was contacted  in person. verbalized understanding of all instructions does not  need reinforcement.

## 2022-01-13 NOTE — PERIOP NOTES
PAT Nurse Practitioner   Pre-Operative Chart Review/Assessment:-ORTHOPEDIC                Patient Name:  Jasen Holt                                                           Age:   78 y.o.    :  1955     Today's Date:  2022     Date of PAT:   2022      Date of Surgery:    2022      Procedure(s):  Right Total Knee Arthroplasty     Surgeon:   Dr. Randy Cervantes                       PLAN:      1)  Medical Clearance:  Dr. Wanda Puri      2)  Cardiac Clearance:  EKG and METs reviewed. No further cardiac evaluation requested. 3)  Diabetic Treatment Consult:  Not indicated. A1c-6.2      4)  Sleep Apnea evaluation:   Not indicated. YAA Score 3.       5) Treatment for MRSA/Staph Aureus:  Neg      6) Additional Concerns:  HTN, GERD, T2DM                Vital Signs:         Vitals:    22 0843   BP: 130/78   Pulse: 74   Resp: 16   Temp: 98 °F (36.7 °C)   SpO2: 97%   Weight: 103.5 kg (228 lb 2.8 oz)   Height: 5' 11\" (1.803 m)            ____________________________________________  PAST MEDICAL HISTORY  Past Medical History:   Diagnosis Date    Arthralgia of knee 7/15/2013    Diabetes ([address]     type 2    Diverticular disease of left colon 3/22/2013    GERD (gastroesophageal reflux disease)     Gout 1985    Hypertension     SLIGHTLY PER PT'S PCP      ____________________________________________  PAST SURGICAL HISTORY  Past Surgical History:   Procedure Laterality Date    HX COLONOSCOPY      Dr. Galina Trinidad    HX HEENT      deviated septum age 12     [address]  HX OTHER SURGICAL  15    excision of lymph node right axilla    HX TONSILLECTOMY      child      ____________________________________________  HOME MEDICATIONS  Current Outpatient Medications   Medication Sig    ibuprofen (MOTRIN) 200 mg tablet Take 800 mg by mouth as needed.  ciprofloxacin HCl (CIPRO) 500 mg tablet as needed (FOR DIVERTICULITIS FLARE UPS).  lisinopril-hydroCHLOROthiazide (PRINZIDE, ZESTORETIC) 10-12.5 mg per tablet Take 1 Tablet by mouth daily. (Patient taking differently: Take 1 Tablet by mouth nightly.)    doxycycline (VIBRAMYCIN) 100 mg capsule TAKE 1 CAPSULE BY MOUTH EVERY DAY (Patient taking differently: nightly.)    metFORMIN ER (GLUCOPHAGE XR) 500 mg tablet TAKE 3 TABLETS BY MOUTH EVERY DAY WITH DINNER    sildenafil citrate (Viagra) 100 mg tablet Take 1 Tab by mouth as needed for Erectile Dysfunction.  atorvastatin (LIPITOR) 10 mg tablet TAKE 1 TABLET BY MOUTH EVERY DAY (Patient taking differently: nightly. TAKE 1 TABLET BY MOUTH EVERY DAY)    aspirin delayed-release 81 mg tablet Take 81 mg by mouth nightly.      No current facility-administered medications for this encounter.      ____________________________________________  ALLERGIES  No Known Allergies   ____________________________________________  SOCIAL HISTORY  Social History     Tobacco Use    Smoking status: Never Smoker    Smokeless tobacco: Never Used   Substance Use Topics    Alcohol use: Yes     Comment: Niya Wheeler      ____________________________________________   Internal Administration   First Dose COVID-19, J&J, PF, 0.5 mL Dose  03/01/2021   Second Dose         Last COVID Lab SARS-CoV-2 ( )   Date Value   01/20/2022 Not detected        Labs:     Hospital Outpatient Visit on 01/13/2022   Component Date Value Ref Range Status    Sodium 01/13/2022 135* 136 - 145 mmol/L Final    Potassium 01/13/2022 4.2  3.5 - 5.1 mmol/L Final    Chloride 01/13/2022 102  97 - 108 mmol/L Final    CO2 01/13/2022 26  21 - 32 mmol/L Final    Anion gap 01/13/2022 7  5 - 15 mmol/L Final    Glucose 01/13/2022 154* 65 - 100 mg/dL Final    BUN 01/13/2022 24* 6 - 20 MG/DL Final    Creatinine 01/13/2022 1.36* 0.70 - 1.30 MG/DL Final    BUN/Creatinine ratio 01/13/2022 18  12 - 20   Final    GFR est AA 01/13/2022 >60  >60 ml/min/1.73m2 Final    GFR est non-AA 01/13/2022 52* >60 ml/min/1.73m2 Final    Estimated GFR is calculated using the IDMS-traceable Modification of Diet in Renal Disease (MDRD) Study equation, reported for both  Americans (GFRAA) and non- Americans (GFRNA), and normalized to 1.73m2 body surface area. The physician must decide which value applies to the patient.  Calcium 01/13/2022 9.2  8.5 - 10.1 MG/DL Final    WBC 01/13/2022 6.0  4.1 - 11.1 K/uL Final    RBC 01/13/2022 4.13  4.10 - 5.70 M/uL Final    HGB 01/13/2022 12.8  12.1 - 17.0 g/dL Final    HCT 01/13/2022 37.4  36.6 - 50.3 % Final    MCV 01/13/2022 90.6  80.0 - 99.0 FL Final    MCH 01/13/2022 31.0  26.0 - 34.0 PG Final    MCHC 01/13/2022 34.2  30.0 - 36.5 g/dL Final    RDW 01/13/2022 14.6* 11.5 - 14.5 % Final    PLATELET 09/33/6534 804* 150 - 400 K/uL Final    MPV 01/13/2022 11.2  8.9 - 12.9 FL Final    NRBC 01/13/2022 0.0  0  WBC Final    ABSOLUTE NRBC 01/13/2022 0.00  0.00 - 0.01 K/uL Final    Crossmatch Expiration 01/13/2022 01/27/2022,2359   Final    ABO/Rh(D) 01/13/2022 B POSITIVE   Final    Antibody screen 01/13/2022 NEG   Final    INR 01/13/2022 1.0  0.9 - 1.1   Final    A single therapeutic range for Vit K antagonists may not be optimal for all indications - see June, 2008 issue of Chest, American College of Chest Physicians Evidence-Based Clinical Practice Guidelines, 8th Edition.     Prothrombin time 01/13/2022 10.6  9.0 - 11.1 sec Final    Color 01/13/2022 YELLOW/STRAW    Final    Color Reference Range: Straw, Yellow or Dark Yellow    Appearance 01/13/2022 CLEAR  CLEAR   Final    Specific gravity 01/13/2022 1.017  1.003 - 1.030   Final    pH (UA) 01/13/2022 5.0  5.0 - 8.0   Final    Protein 01/13/2022 Negative  NEG mg/dL Final    Glucose 01/13/2022 Negative  NEG mg/dL Final    Ketone 01/13/2022 Negative  NEG mg/dL Final    Bilirubin 01/13/2022 Negative  NEG   Final    Blood 01/13/2022 Negative  NEG   Final    Urobilinogen 01/13/2022 0.2  0.2 - 1.0 EU/dL Final    Nitrites 01/13/2022 Negative  NEG   Final    Leukocyte Esterase 01/13/2022 Negative  NEG   Final    UA:UC IF INDICATED 01/13/2022 CULTURE NOT INDICATED BY UA RESULT  CNI   Final    WBC 01/13/2022 0-4  0 - 4 /hpf Final    RBC 01/13/2022 0-5  0 - 5 /hpf Final    Epithelial cells 01/13/2022 FEW  FEW /lpf Final    Epithelial cell category consists of squamous cells and /or transitional urothelial cells. Renal tubular cells, if present, are separately identified as such.  Bacteria 01/13/2022 Negative  NEG /hpf Final    Hyaline cast 01/13/2022 0-2  0 - 5 /lpf Final    Ventricular Rate 01/13/2022 69  BPM Final    Atrial Rate 01/13/2022 69  BPM Final    P-R Interval 01/13/2022 194  ms Final    QRS Duration 01/13/2022 114  ms Final    Q-T Interval 01/13/2022 408  ms Final    QTC Calculation (Bezet) 01/13/2022 437  ms Final    Calculated P Axis 01/13/2022 16  degrees Final    Calculated R Axis 01/13/2022 -33  degrees Final    Calculated T Axis 01/13/2022 29  degrees Final    Diagnosis 01/13/2022    Final                    Value:Normal sinus rhythm  Left axis deviation  Minimal voltage criteria for LVH, may be normal variant  Abnormal ECG  When compared with ECG of 15-JUL-2020 18:05,  Vent. rate has decreased BY  37 BPM  Confirmed by Adrian Ritchie MD, Diogenes Neal ([id number]) on 1/13/2022 5:53:37 PM      Hemoglobin A1c 01/13/2022 6.2* 4.0 - 5.6 % Final    Comment: NEW METHOD  PLEASE NOTE NEW REFERENCE RANGE  (NOTE)  HbA1C Interpretive Ranges  <5.7              Normal  5.7 - 6.4         Consider Prediabetes  >6.5              Consider Diabetes      Est. average glucose 01/13/2022 131  mg/dL Final    Special Requests: 01/13/2022 NO SPECIAL REQUESTS    Final    Culture result: 01/13/2022 MRSA NOT PRESENT    Final       Skin:     Denies open wounds, cuts, sores, rashes or other areas of concern in PAT assessment.           Mika Mendenhall NP Normal for race

## 2022-01-14 LAB
BACTERIA SPEC CULT: NORMAL
BACTERIA SPEC CULT: NORMAL
SERVICE CMNT-IMP: NORMAL

## 2022-01-19 ENCOUNTER — OFFICE VISIT (OUTPATIENT)
Dept: INTERNAL MEDICINE CLINIC | Age: 67
End: 2022-01-19
Payer: MEDICARE

## 2022-01-19 VITALS
HEART RATE: 83 BPM | DIASTOLIC BLOOD PRESSURE: 76 MMHG | BODY MASS INDEX: 32.48 KG/M2 | TEMPERATURE: 97.5 F | WEIGHT: 232 LBS | HEIGHT: 71 IN | SYSTOLIC BLOOD PRESSURE: 125 MMHG | OXYGEN SATURATION: 98 % | RESPIRATION RATE: 20 BRPM

## 2022-01-19 DIAGNOSIS — L71.9 ROSACEA: ICD-10-CM

## 2022-01-19 DIAGNOSIS — I10 HTN, GOAL BELOW 130/80: ICD-10-CM

## 2022-01-19 DIAGNOSIS — E11.9 TYPE 2 DIABETES MELLITUS WITHOUT COMPLICATION, WITHOUT LONG-TERM CURRENT USE OF INSULIN (HCC): ICD-10-CM

## 2022-01-19 DIAGNOSIS — Z01.818 PREOP EXAM FOR INTERNAL MEDICINE: Primary | ICD-10-CM

## 2022-01-19 PROCEDURE — 99214 OFFICE O/P EST MOD 30 MIN: CPT | Performed by: INTERNAL MEDICINE

## 2022-01-19 PROCEDURE — G8427 DOCREV CUR MEDS BY ELIG CLIN: HCPCS | Performed by: INTERNAL MEDICINE

## 2022-01-19 PROCEDURE — 2022F DILAT RTA XM EVC RTNOPTHY: CPT | Performed by: INTERNAL MEDICINE

## 2022-01-19 PROCEDURE — 3017F COLORECTAL CA SCREEN DOC REV: CPT | Performed by: INTERNAL MEDICINE

## 2022-01-19 PROCEDURE — G8510 SCR DEP NEG, NO PLAN REQD: HCPCS | Performed by: INTERNAL MEDICINE

## 2022-01-19 PROCEDURE — G0463 HOSPITAL OUTPT CLINIC VISIT: HCPCS | Performed by: INTERNAL MEDICINE

## 2022-01-19 PROCEDURE — G8754 DIAS BP LESS 90: HCPCS | Performed by: INTERNAL MEDICINE

## 2022-01-19 PROCEDURE — G8752 SYS BP LESS 140: HCPCS | Performed by: INTERNAL MEDICINE

## 2022-01-19 PROCEDURE — G8536 NO DOC ELDER MAL SCRN: HCPCS | Performed by: INTERNAL MEDICINE

## 2022-01-19 PROCEDURE — 3044F HG A1C LEVEL LT 7.0%: CPT | Performed by: INTERNAL MEDICINE

## 2022-01-19 PROCEDURE — 1101F PT FALLS ASSESS-DOCD LE1/YR: CPT | Performed by: INTERNAL MEDICINE

## 2022-01-19 PROCEDURE — G8417 CALC BMI ABV UP PARAM F/U: HCPCS | Performed by: INTERNAL MEDICINE

## 2022-01-19 RX ORDER — CEPHALEXIN 500 MG/1
500 CAPSULE ORAL 4 TIMES DAILY
Qty: 28 CAPSULE | Refills: 0 | Status: SHIPPED | OUTPATIENT
Start: 2022-01-19 | End: 2022-01-26

## 2022-01-19 NOTE — PROGRESS NOTES
Chief Complaint   Patient presents with    Pre-op Exam     1. Have you been to the ER, urgent care clinic since your last visit? Hospitalized since your last visit? No    2. Have you seen or consulted any other health care providers outside of the [address] [place] [address] since your last visit? Include any pap smears or colon screening. Yes- [facility].      Visit Vitals  /76   Pulse 83   Temp 97.5 °F (36.4 °C) (Temporal)   Resp 20   Ht 5' 11\" (1.803 m)   Wt 232 lb (105.2 kg)   SpO2 98%   BMI 32.36 kg/m²

## 2022-01-19 NOTE — PROGRESS NOTES
Lawyer Whitt was referred for evaluation by:Dr. turner for Pre- Op Evaluation. Please see encounter details and orders for consultative summary. Type of surgery : knee replacement right  Surgery site : st   Anesthesia type: general  Date of procedure:  1/25/22    Allergies: No Known Allergies  Latex allergy: no  Prior reactions to anesthesia:  None    Functional status:  he is able to ambulate up 1 flights of step withno shortness of breath, chest pain  Prior cardiac evaluation:   no    Current Outpatient Medications   Medication Sig    ciprofloxacin HCl (CIPRO) 500 mg tablet as needed (FOR DIVERTICULITIS FLARE UPS).  lisinopril-hydroCHLOROthiazide (PRINZIDE, ZESTORETIC) 10-12.5 mg per tablet Take 1 Tablet by mouth daily. (Patient taking differently: Take 1 Tablet by mouth nightly.)    doxycycline (VIBRAMYCIN) 100 mg capsule TAKE 1 CAPSULE BY MOUTH EVERY DAY (Patient taking differently: nightly.)    metFORMIN ER (GLUCOPHAGE XR) 500 mg tablet TAKE 3 TABLETS BY MOUTH EVERY DAY WITH DINNER    sildenafil citrate (Viagra) 100 mg tablet Take 1 Tab by mouth as needed for Erectile Dysfunction.  atorvastatin (LIPITOR) 10 mg tablet TAKE 1 TABLET BY MOUTH EVERY DAY (Patient taking differently: nightly. TAKE 1 TABLET BY MOUTH EVERY DAY)    aspirin delayed-release 81 mg tablet Take 81 mg by mouth nightly.  ibuprofen (MOTRIN) 200 mg tablet Take 800 mg by mouth as needed. No current facility-administered medications for this visit. Past Medical History:   Diagnosis Date    Arthralgia of knee 7/15/2013    Diabetes ([address]     type 2    Diverticular disease of left colon 3/22/2013    GERD (gastroesophageal reflux disease)     Gout 12/6/1985    Hypertension     SLIGHTLY PER PT'S PCP     Past Surgical History:   Procedure Laterality Date    HX COLONOSCOPY  2000    Dr. Jude Trinidad    HX HEENT      deviated septum age 12     [facility] [address]     HX OTHER SURGICAL  1/21/16    excision of lymph node right axilla    HX TONSILLECTOMY      child     Social History     Tobacco Use    Smoking status: Never Smoker    Smokeless tobacco: Never Used   Vaping Use    Vaping Use: Never used   Substance Use Topics    Alcohol use: Yes     Comment: OCCASSIONELY    Drug use: No       Blood pressure 125/76, pulse 83, temperature 97.5 °F (36.4 °C), temperature source Temporal, resp. rate 20, height 5' 11\" (1.803 m), weight 232 lb (105.2 kg), SpO2 98 %. no apparent distress  S1 and S2 normal, no murmurs, clicks, gallops or rubs. Regular rate and rhythm. Chest is clear; no wheezes or rales. No edema or JVD. The abdomen is soft without tenderness, guarding, mass, rebound or organomegaly. Bowel sounds are normal. No CVA tenderness or inguinal adenopathy noted. Neuro: Cranial nerves and fundi are normal. HANNAH. EOM's intact. No papilledema. Neck supple. No bruits. Normal deep tendon reflexes. Cleared for the planned surgical procedure and anesthesia based on todays findings and exam        Lab Results   Component Value Date/Time    Hemoglobin A1c 6.2 (H) 01/13/2022 08:59 AM    Hemoglobin A1c (POC) 5.6 12/02/2015 12:00 PM     Lab Results   Component Value Date/Time    GFR est AA >60 01/13/2022 08:59 AM    GFR est non-AA 52 (L) 01/13/2022 08:59 AM    Creatinine 1.36 (H) 01/13/2022 08:59 AM    BUN 24 (H) 01/13/2022 08:59 AM    Sodium 135 (L) 01/13/2022 08:59 AM    Potassium 4.2 01/13/2022 08:59 AM    Chloride 102 01/13/2022 08:59 AM    CO2 26 01/13/2022 08:59 AM           Given keflex to hold for diverticular infections in future      EKG: normal EKG, normal sinus rhythm, LVH as  Before      . 1. Preop exam for internal medicine  cleared    2. Type 2 diabetes mellitus without complication, without long-term current use of insulin (HCC)  Controlled well    3. HTN, goal below 130/80  good    4.  Rosacea  Using doxy see derm when able

## 2022-01-20 ENCOUNTER — HOSPITAL ENCOUNTER (OUTPATIENT)
Dept: PREADMISSION TESTING | Age: 67
Discharge: HOME | End: 2022-01-20
Attending: ORTHOPAEDIC SURGERY
Payer: MEDICARE

## 2022-01-20 DIAGNOSIS — Z01.812 PRE-PROCEDURE LAB EXAM: ICD-10-CM

## 2022-01-20 LAB
SARS-COV-2, XPLCVT: NOT DETECTED
SOURCE, COVRS: NORMAL

## 2022-01-20 PROCEDURE — U0005 INFEC AGEN DETEC AMPLI PROBE: HCPCS

## 2022-01-20 RX ORDER — ACETAMINOPHEN 500 MG
1000 TABLET ORAL ONCE
Status: CANCELLED | OUTPATIENT
Start: 2022-01-20 | End: 2022-01-20

## 2022-01-20 RX ORDER — CELECOXIB 200 MG/1
200 CAPSULE ORAL ONCE
Status: CANCELLED | OUTPATIENT
Start: 2022-01-20 | End: 2022-01-20

## 2022-01-20 RX ORDER — PREGABALIN 75 MG/1
75 CAPSULE ORAL ONCE
Status: CANCELLED | OUTPATIENT
Start: 2022-01-20 | End: 2022-01-20

## 2022-01-25 ENCOUNTER — ANESTHESIA EVENT (OUTPATIENT)
Dept: SURGERY | Age: 67
End: 2022-01-25

## 2022-01-25 ENCOUNTER — HOSPITAL ENCOUNTER (OUTPATIENT)
Age: 67
Discharge: HOME | End: 2022-01-25
Attending: ORTHOPAEDIC SURGERY | Admitting: ORTHOPAEDIC SURGERY

## 2022-01-25 ENCOUNTER — ANESTHESIA (OUTPATIENT)
Dept: SURGERY | Age: 67
End: 2022-01-25

## 2022-01-25 VITALS
BODY MASS INDEX: 31.94 KG/M2 | HEART RATE: 97 BPM | SYSTOLIC BLOOD PRESSURE: 152 MMHG | HEIGHT: 71 IN | WEIGHT: 228.18 LBS | OXYGEN SATURATION: 99 % | DIASTOLIC BLOOD PRESSURE: 77 MMHG | RESPIRATION RATE: 14 BRPM | TEMPERATURE: 98.6 F

## 2022-01-25 PROBLEM — M17.11 PRIMARY LOCALIZED OSTEOARTHRITIS OF RIGHT KNEE: Status: ACTIVE | Noted: 2022-01-25

## 2022-01-25 RX ORDER — SODIUM CHLORIDE 0.9 % (FLUSH) 0.9 %
5-40 SYRINGE (ML) INJECTION AS NEEDED
Status: CANCELLED | OUTPATIENT
Start: 2022-01-25

## 2022-01-25 RX ORDER — DEXTROSE 50 % IN WATER (D50W) INTRAVENOUS SYRINGE
12.5-25 AS NEEDED
Status: CANCELLED | OUTPATIENT
Start: 2022-01-25

## 2022-01-25 RX ORDER — FENTANYL CITRATE 50 UG/ML
50 INJECTION, SOLUTION INTRAMUSCULAR; INTRAVENOUS AS NEEDED
Status: DISCONTINUED | OUTPATIENT
Start: 2022-01-25 | End: 2022-01-25 | Stop reason: HOSPADM

## 2022-01-25 RX ORDER — INSULIN LISPRO 100 [IU]/ML
INJECTION, SOLUTION INTRAVENOUS; SUBCUTANEOUS
Status: CANCELLED | OUTPATIENT
Start: 2022-01-25

## 2022-01-25 RX ORDER — OXYCODONE HYDROCHLORIDE 5 MG/1
2.5 TABLET ORAL
Status: CANCELLED | OUTPATIENT
Start: 2022-01-25

## 2022-01-25 RX ORDER — HYDROMORPHONE HYDROCHLORIDE 1 MG/ML
0.2 INJECTION, SOLUTION INTRAMUSCULAR; INTRAVENOUS; SUBCUTANEOUS
Status: CANCELLED | OUTPATIENT
Start: 2022-01-25

## 2022-01-25 RX ORDER — LIDOCAINE HYDROCHLORIDE 10 MG/ML
0.1 INJECTION, SOLUTION EPIDURAL; INFILTRATION; INTRACAUDAL; PERINEURAL AS NEEDED
Status: DISCONTINUED | OUTPATIENT
Start: 2022-01-25 | End: 2022-01-25 | Stop reason: HOSPADM

## 2022-01-25 RX ORDER — KETOROLAC TROMETHAMINE 30 MG/ML
15 INJECTION, SOLUTION INTRAMUSCULAR; INTRAVENOUS EVERY 6 HOURS
Status: CANCELLED | OUTPATIENT
Start: 2022-01-25 | End: 2022-01-26

## 2022-01-25 RX ORDER — HYDROMORPHONE HYDROCHLORIDE 1 MG/ML
0.5 INJECTION, SOLUTION INTRAMUSCULAR; INTRAVENOUS; SUBCUTANEOUS
Status: CANCELLED | OUTPATIENT
Start: 2022-01-25 | End: 2022-01-26

## 2022-01-25 RX ORDER — SODIUM CHLORIDE 0.9 % (FLUSH) 0.9 %
5-40 SYRINGE (ML) INJECTION EVERY 8 HOURS
Status: DISCONTINUED | OUTPATIENT
Start: 2022-01-25 | End: 2022-01-25 | Stop reason: HOSPADM

## 2022-01-25 RX ORDER — POLYETHYLENE GLYCOL 3350 17 G/17G
17 POWDER, FOR SOLUTION ORAL DAILY
Status: CANCELLED | OUTPATIENT
Start: 2022-01-25

## 2022-01-25 RX ORDER — ATORVASTATIN CALCIUM 10 MG/1
10 TABLET, FILM COATED ORAL
Status: CANCELLED | OUTPATIENT
Start: 2022-01-25

## 2022-01-25 RX ORDER — AMOXICILLIN 250 MG
1 CAPSULE ORAL 2 TIMES DAILY
Status: CANCELLED | OUTPATIENT
Start: 2022-01-25

## 2022-01-25 RX ORDER — ASPIRIN 81 MG/1
81 TABLET ORAL 2 TIMES DAILY
Status: CANCELLED | OUTPATIENT
Start: 2022-01-25

## 2022-01-25 RX ORDER — ACETAMINOPHEN 500 MG
500 TABLET ORAL
Status: CANCELLED | OUTPATIENT
Start: 2022-01-25

## 2022-01-25 RX ORDER — MIDAZOLAM HYDROCHLORIDE 1 MG/ML
1 INJECTION, SOLUTION INTRAMUSCULAR; INTRAVENOUS AS NEEDED
Status: DISCONTINUED | OUTPATIENT
Start: 2022-01-25 | End: 2022-01-25 | Stop reason: HOSPADM

## 2022-01-25 RX ORDER — OXYCODONE HYDROCHLORIDE 5 MG/1
5 TABLET ORAL
Status: CANCELLED | OUTPATIENT
Start: 2022-01-25

## 2022-01-25 RX ORDER — FACIAL-BODY WIPES
10 EACH TOPICAL DAILY PRN
Status: CANCELLED | OUTPATIENT
Start: 2022-01-27

## 2022-01-25 RX ORDER — PREGABALIN 75 MG/1
75 CAPSULE ORAL ONCE
Status: DISCONTINUED | OUTPATIENT
Start: 2022-01-25 | End: 2022-01-25 | Stop reason: HOSPADM

## 2022-01-25 RX ORDER — ONDANSETRON 2 MG/ML
4 INJECTION INTRAMUSCULAR; INTRAVENOUS AS NEEDED
Status: CANCELLED | OUTPATIENT
Start: 2022-01-25

## 2022-01-25 RX ORDER — SODIUM CHLORIDE 0.9 % (FLUSH) 0.9 %
5-40 SYRINGE (ML) INJECTION EVERY 8 HOURS
Status: CANCELLED | OUTPATIENT
Start: 2022-01-25

## 2022-01-25 RX ORDER — ACETAMINOPHEN 325 MG/1
650 TABLET ORAL ONCE
Status: DISCONTINUED | OUTPATIENT
Start: 2022-01-25 | End: 2022-01-25 | Stop reason: SDUPTHER

## 2022-01-25 RX ORDER — SODIUM CHLORIDE 9 MG/ML
125 INJECTION, SOLUTION INTRAVENOUS CONTINUOUS
Status: CANCELLED | OUTPATIENT
Start: 2022-01-25 | End: 2022-01-26

## 2022-01-25 RX ORDER — HYDROXYZINE HYDROCHLORIDE 10 MG/1
10 TABLET, FILM COATED ORAL
Status: CANCELLED | OUTPATIENT
Start: 2022-01-25

## 2022-01-25 RX ORDER — ACETAMINOPHEN 500 MG
1000 TABLET ORAL ONCE
Status: DISCONTINUED | OUTPATIENT
Start: 2022-01-25 | End: 2022-01-25 | Stop reason: HOSPADM

## 2022-01-25 RX ORDER — FENTANYL CITRATE 50 UG/ML
25 INJECTION, SOLUTION INTRAMUSCULAR; INTRAVENOUS
Status: CANCELLED | OUTPATIENT
Start: 2022-01-25

## 2022-01-25 RX ORDER — CELECOXIB 200 MG/1
200 CAPSULE ORAL ONCE
Status: DISCONTINUED | OUTPATIENT
Start: 2022-01-25 | End: 2022-01-25 | Stop reason: HOSPADM

## 2022-01-25 RX ORDER — MAGNESIUM SULFATE 100 %
4 CRYSTALS MISCELLANEOUS AS NEEDED
Status: CANCELLED | OUTPATIENT
Start: 2022-01-25

## 2022-01-25 RX ORDER — SODIUM CHLORIDE, SODIUM LACTATE, POTASSIUM CHLORIDE, CALCIUM CHLORIDE 600; 310; 30; 20 MG/100ML; MG/100ML; MG/100ML; MG/100ML
50 INJECTION, SOLUTION INTRAVENOUS CONTINUOUS
Status: DISCONTINUED | OUTPATIENT
Start: 2022-01-25 | End: 2022-01-25 | Stop reason: HOSPADM

## 2022-01-25 RX ORDER — SODIUM CHLORIDE 0.9 % (FLUSH) 0.9 %
5-40 SYRINGE (ML) INJECTION AS NEEDED
Status: DISCONTINUED | OUTPATIENT
Start: 2022-01-25 | End: 2022-01-25 | Stop reason: HOSPADM

## 2022-01-25 RX ORDER — ONDANSETRON 2 MG/ML
4 INJECTION INTRAMUSCULAR; INTRAVENOUS
Status: CANCELLED | OUTPATIENT
Start: 2022-01-25 | End: 2022-01-26

## 2022-01-25 RX ORDER — NALOXONE HYDROCHLORIDE 0.4 MG/ML
0.4 INJECTION, SOLUTION INTRAMUSCULAR; INTRAVENOUS; SUBCUTANEOUS AS NEEDED
Status: CANCELLED | OUTPATIENT
Start: 2022-01-25

## 2022-01-25 NOTE — DISCHARGE INSTRUCTIONS
Post-op Discharge Instructions Following Total Joint Replacement  Terry Nuno MD  [facility]  [phone]  Joan Robbins See Dr. Darwin Etienne approximately 3-4 weeks from date of surgery. Call [phone] to make an appointment.  Call Alex Ricardo RN if you have questions or concerns, [phone]. Activity   Use your walker for ambulation. Weight bearing as tolerated unless instructed otherwise by the physical therapist. Get up every hour you are awake and take a brief walk. Lengthen walking distance daily as your strength improves.  Continue using your walker until seen in the office for your first follow up visit.  Practice your exercises 3 times daily as instructed by the physical therapist. Elaine Aguilar for 20 minutes after exercising.  No driving until seen in the office for your first follow up visit. Incision Care   The light brown Aquacel surgical dressing is waterproof and is to remain on your incision for 7 days. On the 7th day, carefully lift the edge of the dressing to break the adhesive seal and gently peel it off.  If your Aquacel dressings comes loose or falls off before the 7th day, replace it with a dry sterile gauze dressing and change this dressing daily. Once there is no drainage on the bandage, you mean leave the incision open to air.  You may take a shower with the Aquacel dressing in place. After you remove the Aquacel dressing on day 7, you may continue to shower and get your incision wet in the shower. Do not submerge your incision under water in a bathtub, hot tub, swimming pool, etc. until after you have been evaluated at your first office visit. Medications   Blood Clot Prevention: Take medication as prescribed by your physician for 4 weeks postop.  Pain Management: Take pain medication as prescribed; wean yourself off of pain medication as your pain lessens. Take with food.  You make also take Tylenol every 4-6 hours as needed for pain.   Do not exceed 3 grams (3000mg) per day.  Place an ice bag on or around the incision for 20 minutes on / 20 minutes off as needed throughout the day and night, especially after exercising.  Stool Softener: You may want to take a stool softener (such as Senokot-S or Colace) to prevent constipation while taking pain medication. If constipation occurs, you may also use a laxative (such as Dulcolax tablets, Miralax, or a suppository). Diet   Resume usual diet at home. Drink plenty of fluids. Eat foods high in fiber and protein. Calcium and Vitamin D supplements recommended. Avoid alcoholic beverages. No smoking. When to call your Orthopaedic Surgeon: If you call after 5pm or on a weekend, the on call physician will return your call   Pain that is not relieved by pain medication, ice, and activity modification   Signs of infection (red incision, continuous drainage from the incision, malodorous drainage, persistent fever greater than 101 degrees Fahrenheit)   Signs of a blood clot in your leg (calf pain, tenderness, redness, and/or swelling of the lower leg)  ?   When to call your Primary Care Physician   Concerns about your medical conditions such as diabetes, high blood pressure, asthma, congestive heart failure   Call if blood sugars are elevated, if you have a persistent headache or dizziness, coughing or congestion, constipation or diarrhea, burning with urination, abnormal heart rate (fast or slow)  When to call 911 and go to the nearest Emergency Room   Acute onset of chest pain, shortness of breath, difficulty breathing

## 2022-01-25 NOTE — PROGRESS NOTES
In preoperative holding area this morning, patient admitted to eating crackers this morning before he left home. Surgery was canceled for today. Procedure will be rescheduled at a later date.     Ilda Connelly MD

## 2022-01-28 ENCOUNTER — TRANSCRIBE ORDER (OUTPATIENT)
Dept: REGISTRATION | Age: 67
End: 2022-01-28

## 2022-01-28 DIAGNOSIS — Z01.812 PRE-PROCEDURE LAB EXAM: Primary | ICD-10-CM

## 2022-02-03 RX ORDER — CELECOXIB 200 MG/1
200 CAPSULE ORAL ONCE
Status: CANCELLED | OUTPATIENT
Start: 2022-02-03 | End: 2022-02-03

## 2022-02-03 RX ORDER — ACETAMINOPHEN 500 MG
1000 TABLET ORAL ONCE
Status: CANCELLED | OUTPATIENT
Start: 2022-02-03 | End: 2022-02-03

## 2022-02-03 RX ORDER — PREGABALIN 75 MG/1
75 CAPSULE ORAL ONCE
Status: CANCELLED | OUTPATIENT
Start: 2022-02-03 | End: 2022-02-03

## 2022-02-07 ENCOUNTER — HOSPITAL ENCOUNTER (OUTPATIENT)
Dept: PREADMISSION TESTING | Age: 67
Discharge: HOME | End: 2022-02-07
Attending: ORTHOPAEDIC SURGERY
Payer: MEDICARE

## 2022-02-07 DIAGNOSIS — Z01.812 PRE-PROCEDURE LAB EXAM: ICD-10-CM

## 2022-02-07 LAB
SARS-COV-2, XPLCVT: NOT DETECTED
SOURCE, COVRS: NORMAL

## 2022-02-07 PROCEDURE — U0005 INFEC AGEN DETEC AMPLI PROBE: HCPCS

## 2022-02-09 ENCOUNTER — ANESTHESIA EVENT (OUTPATIENT)
Dept: SURGERY | Age: 67
End: 2022-02-09
Payer: MEDICARE

## 2022-02-10 ENCOUNTER — ANESTHESIA (OUTPATIENT)
Dept: SURGERY | Age: 67
End: 2022-02-10
Payer: MEDICARE

## 2022-02-10 ENCOUNTER — HOSPITAL ENCOUNTER (OUTPATIENT)
Age: 67
Discharge: HOME HEALTH | End: 2022-02-11
Attending: ORTHOPAEDIC SURGERY | Admitting: ORTHOPAEDIC SURGERY
Payer: MEDICARE

## 2022-02-10 DIAGNOSIS — M17.11 PRIMARY LOCALIZED OSTEOARTHRITIS OF RIGHT KNEE: Primary | ICD-10-CM

## 2022-02-10 LAB
ABO + RH BLD: NORMAL
BLOOD GROUP ANTIBODIES SERPL: NORMAL
GLUCOSE BLD STRIP.AUTO-MCNC: 143 MG/DL (ref 65–117)
GLUCOSE BLD STRIP.AUTO-MCNC: 179 MG/DL (ref 65–117)
GLUCOSE BLD STRIP.AUTO-MCNC: 185 MG/DL (ref 65–117)
GLUCOSE BLD STRIP.AUTO-MCNC: 203 MG/DL (ref 65–117)
SERVICE CMNT-IMP: ABNORMAL
SPECIMEN EXP DATE BLD: NORMAL

## 2022-02-10 PROCEDURE — 2709999900 HC NON-CHARGEABLE SUPPLY: Performed by: ORTHOPAEDIC SURGERY

## 2022-02-10 PROCEDURE — 36415 COLL VENOUS BLD VENIPUNCTURE: CPT

## 2022-02-10 PROCEDURE — 74011000250 HC RX REV CODE- 250: Performed by: ANESTHESIOLOGY

## 2022-02-10 PROCEDURE — 74011250636 HC RX REV CODE- 250/636: Performed by: ANESTHESIOLOGY

## 2022-02-10 PROCEDURE — 74011000250 HC RX REV CODE- 250

## 2022-02-10 PROCEDURE — C1776 JOINT DEVICE (IMPLANTABLE): HCPCS | Performed by: ORTHOPAEDIC SURGERY

## 2022-02-10 PROCEDURE — 77030014077 HC TOWER MX CEM J&J -C: Performed by: ORTHOPAEDIC SURGERY

## 2022-02-10 PROCEDURE — C1713 ANCHOR/SCREW BN/BN,TIS/BN: HCPCS | Performed by: ORTHOPAEDIC SURGERY

## 2022-02-10 PROCEDURE — 77030040750 HC INSTR NAV SYS DISP ORLN -G: Performed by: ORTHOPAEDIC SURGERY

## 2022-02-10 PROCEDURE — 76010000171 HC OR TIME 2 TO 2.5 HR INTENSV-TIER 1: Performed by: ORTHOPAEDIC SURGERY

## 2022-02-10 PROCEDURE — 74011250636 HC RX REV CODE- 250/636: Performed by: PHYSICIAN ASSISTANT

## 2022-02-10 PROCEDURE — 74011250637 HC RX REV CODE- 250/637: Performed by: PHYSICIAN ASSISTANT

## 2022-02-10 PROCEDURE — 86900 BLOOD TYPING SEROLOGIC ABO: CPT

## 2022-02-10 PROCEDURE — 77030019905 HC CATH URETH INTMIT MDII -A: Performed by: ORTHOPAEDIC SURGERY

## 2022-02-10 PROCEDURE — 74011250636 HC RX REV CODE- 250/636

## 2022-02-10 PROCEDURE — 97116 GAIT TRAINING THERAPY: CPT

## 2022-02-10 PROCEDURE — 77030000032 HC CUF TRNQT ZIMM -B: Performed by: ORTHOPAEDIC SURGERY

## 2022-02-10 PROCEDURE — 74011000250 HC RX REV CODE- 250: Performed by: NURSE ANESTHETIST, CERTIFIED REGISTERED

## 2022-02-10 PROCEDURE — 77030039497 HC CST PAD STERILE CHCS -A: Performed by: ORTHOPAEDIC SURGERY

## 2022-02-10 PROCEDURE — 77030031139 HC SUT VCRL2 J&J -A: Performed by: ORTHOPAEDIC SURGERY

## 2022-02-10 PROCEDURE — 76060000035 HC ANESTHESIA 2 TO 2.5 HR: Performed by: ORTHOPAEDIC SURGERY

## 2022-02-10 PROCEDURE — 74011000250 HC RX REV CODE- 250: Performed by: PHYSICIAN ASSISTANT

## 2022-02-10 PROCEDURE — 77030007866 HC KT SPN ANES BBMI -B: Performed by: ANESTHESIOLOGY

## 2022-02-10 PROCEDURE — 76210000017 HC OR PH I REC 1.5 TO 2 HR: Performed by: ORTHOPAEDIC SURGERY

## 2022-02-10 PROCEDURE — 77030002933 HC SUT MCRYL J&J -A: Performed by: ORTHOPAEDIC SURGERY

## 2022-02-10 PROCEDURE — 74011250636 HC RX REV CODE- 250/636: Performed by: NURSE ANESTHETIST, CERTIFIED REGISTERED

## 2022-02-10 PROCEDURE — 74011250637 HC RX REV CODE- 250/637

## 2022-02-10 PROCEDURE — 77030006835 HC BLD SAW SAG STRY -B: Performed by: ORTHOPAEDIC SURGERY

## 2022-02-10 PROCEDURE — 77030040361 HC SLV COMPR DVT MDII -B

## 2022-02-10 PROCEDURE — 77030020268 HC MISC GENERAL SUPPLY: Performed by: ORTHOPAEDIC SURGERY

## 2022-02-10 PROCEDURE — 77030012935 HC DRSG AQUACEL BMS -B: Performed by: ORTHOPAEDIC SURGERY

## 2022-02-10 PROCEDURE — 77030010507 HC ADH SKN DERMBND J&J -B: Performed by: ORTHOPAEDIC SURGERY

## 2022-02-10 PROCEDURE — 77030033067 HC SUT PDO STRATFX SPIR J&J -B: Performed by: ORTHOPAEDIC SURGERY

## 2022-02-10 PROCEDURE — 74011000258 HC RX REV CODE- 258: Performed by: NURSE ANESTHETIST, CERTIFIED REGISTERED

## 2022-02-10 PROCEDURE — 77030006807 HC BLD SAW RECIP KMET -B: Performed by: ORTHOPAEDIC SURGERY

## 2022-02-10 PROCEDURE — 82962 GLUCOSE BLOOD TEST: CPT

## 2022-02-10 PROCEDURE — 97162 PT EVAL MOD COMPLEX 30 MIN: CPT

## 2022-02-10 PROCEDURE — 77030005513 HC CATH URETH FOL11 MDII -B: Performed by: ORTHOPAEDIC SURGERY

## 2022-02-10 DEVICE — IMPL CAPPED KNEE K1 TOTAL/HEMI STD CEMENTED DJO: Type: IMPLANTABLE DEVICE | Site: KNEE | Status: FUNCTIONAL

## 2022-02-10 DEVICE — DOMED TRI-PEG PATELLA, 32X8MM, E-PLUS
Type: IMPLANTABLE DEVICE | Site: KNEE | Status: FUNCTIONAL
Brand: DJO SURGICAL

## 2022-02-10 DEVICE — EMPOWR 3D KNEETM INS, 10R 10MM, VE
Type: IMPLANTABLE DEVICE | Site: KNEE | Status: FUNCTIONAL
Brand: DJO SURGICAL

## 2022-02-10 DEVICE — SMARTSET GMV HIGH PERFORMANCE GENTAMICIN MEDIUM VISCOSITY BONE CEMENT 40G
Type: IMPLANTABLE DEVICE | Site: KNEE | Status: FUNCTIONAL
Brand: SMARTSET

## 2022-02-10 DEVICE — EMPOWR 3D KNEETM FEMUR, NP, 10R
Type: IMPLANTABLE DEVICE | Site: KNEE | Status: FUNCTIONAL
Brand: DJO SURGICAL

## 2022-02-10 DEVICE — DJO EMPOWR KNEETM, FIN BP, NP 10R
Type: IMPLANTABLE DEVICE | Site: KNEE | Status: FUNCTIONAL
Brand: DJO SURGICAL

## 2022-02-10 RX ORDER — SODIUM CHLORIDE, SODIUM LACTATE, POTASSIUM CHLORIDE, CALCIUM CHLORIDE 600; 310; 30; 20 MG/100ML; MG/100ML; MG/100ML; MG/100ML
100 INJECTION, SOLUTION INTRAVENOUS CONTINUOUS
Status: DISCONTINUED | OUTPATIENT
Start: 2022-02-10 | End: 2022-02-10 | Stop reason: HOSPADM

## 2022-02-10 RX ORDER — MORPHINE SULFATE 2 MG/ML
2 INJECTION, SOLUTION INTRAMUSCULAR; INTRAVENOUS
Status: DISCONTINUED | OUTPATIENT
Start: 2022-02-10 | End: 2022-02-10 | Stop reason: HOSPADM

## 2022-02-10 RX ORDER — KETOROLAC TROMETHAMINE 30 MG/ML
15 INJECTION, SOLUTION INTRAMUSCULAR; INTRAVENOUS EVERY 6 HOURS
Status: DISCONTINUED | OUTPATIENT
Start: 2022-02-10 | End: 2022-02-11 | Stop reason: HOSPADM

## 2022-02-10 RX ORDER — SODIUM CHLORIDE 0.9 % (FLUSH) 0.9 %
5-40 SYRINGE (ML) INJECTION EVERY 8 HOURS
Status: DISCONTINUED | OUTPATIENT
Start: 2022-02-10 | End: 2022-02-11 | Stop reason: HOSPADM

## 2022-02-10 RX ORDER — MIDAZOLAM HYDROCHLORIDE 1 MG/ML
1 INJECTION, SOLUTION INTRAMUSCULAR; INTRAVENOUS AS NEEDED
Status: DISCONTINUED | OUTPATIENT
Start: 2022-02-10 | End: 2022-02-10 | Stop reason: HOSPADM

## 2022-02-10 RX ORDER — AMOXICILLIN 250 MG
1 CAPSULE ORAL 2 TIMES DAILY
Status: DISCONTINUED | OUTPATIENT
Start: 2022-02-10 | End: 2022-02-11 | Stop reason: HOSPADM

## 2022-02-10 RX ORDER — ONDANSETRON 2 MG/ML
4 INJECTION INTRAMUSCULAR; INTRAVENOUS
Status: ACTIVE | OUTPATIENT
Start: 2022-02-10 | End: 2022-02-11

## 2022-02-10 RX ORDER — FENTANYL CITRATE 50 UG/ML
50 INJECTION, SOLUTION INTRAMUSCULAR; INTRAVENOUS AS NEEDED
Status: DISCONTINUED | OUTPATIENT
Start: 2022-02-10 | End: 2022-02-10 | Stop reason: HOSPADM

## 2022-02-10 RX ORDER — SODIUM CHLORIDE 0.9 % (FLUSH) 0.9 %
5-40 SYRINGE (ML) INJECTION AS NEEDED
Status: DISCONTINUED | OUTPATIENT
Start: 2022-02-10 | End: 2022-02-11 | Stop reason: HOSPADM

## 2022-02-10 RX ORDER — SODIUM CHLORIDE 0.9 % (FLUSH) 0.9 %
5-40 SYRINGE (ML) INJECTION AS NEEDED
Status: DISCONTINUED | OUTPATIENT
Start: 2022-02-10 | End: 2022-02-10 | Stop reason: HOSPADM

## 2022-02-10 RX ORDER — NALOXONE HYDROCHLORIDE 0.4 MG/ML
0.4 INJECTION, SOLUTION INTRAMUSCULAR; INTRAVENOUS; SUBCUTANEOUS AS NEEDED
Status: DISCONTINUED | OUTPATIENT
Start: 2022-02-10 | End: 2022-02-11 | Stop reason: HOSPADM

## 2022-02-10 RX ORDER — HYDROXYZINE HYDROCHLORIDE 10 MG/1
10 TABLET, FILM COATED ORAL
Status: DISCONTINUED | OUTPATIENT
Start: 2022-02-10 | End: 2022-02-11 | Stop reason: HOSPADM

## 2022-02-10 RX ORDER — ACETAMINOPHEN 500 MG
500 TABLET ORAL EVERY 4 HOURS
Qty: 100 TABLET | Refills: 0 | Status: SHIPPED
Start: 2022-02-10

## 2022-02-10 RX ORDER — SODIUM CHLORIDE 9 MG/ML
25 INJECTION, SOLUTION INTRAVENOUS CONTINUOUS
Status: DISCONTINUED | OUTPATIENT
Start: 2022-02-10 | End: 2022-02-10 | Stop reason: HOSPADM

## 2022-02-10 RX ORDER — HYDROCHLOROTHIAZIDE 25 MG/1
12.5 TABLET ORAL DAILY
Status: DISCONTINUED | OUTPATIENT
Start: 2022-02-11 | End: 2022-02-11 | Stop reason: HOSPADM

## 2022-02-10 RX ORDER — ASPIRIN 81 MG/1
81 TABLET ORAL 2 TIMES DAILY
Qty: 60 TABLET | Refills: 0 | Status: SHIPPED | OUTPATIENT
Start: 2022-02-10

## 2022-02-10 RX ORDER — ATORVASTATIN CALCIUM 10 MG/1
10 TABLET, FILM COATED ORAL
Status: DISCONTINUED | OUTPATIENT
Start: 2022-02-11 | End: 2022-02-11 | Stop reason: HOSPADM

## 2022-02-10 RX ORDER — LIDOCAINE HYDROCHLORIDE 20 MG/ML
INJECTION, SOLUTION EPIDURAL; INFILTRATION; INTRACAUDAL; PERINEURAL AS NEEDED
Status: DISCONTINUED | OUTPATIENT
Start: 2022-02-10 | End: 2022-02-10 | Stop reason: HOSPADM

## 2022-02-10 RX ORDER — LISINOPRIL 10 MG/1
10 TABLET ORAL DAILY
Status: DISCONTINUED | OUTPATIENT
Start: 2022-02-11 | End: 2022-02-11 | Stop reason: HOSPADM

## 2022-02-10 RX ORDER — DIPHENHYDRAMINE HYDROCHLORIDE 50 MG/ML
12.5 INJECTION, SOLUTION INTRAMUSCULAR; INTRAVENOUS AS NEEDED
Status: DISCONTINUED | OUTPATIENT
Start: 2022-02-10 | End: 2022-02-10 | Stop reason: HOSPADM

## 2022-02-10 RX ORDER — OXYCODONE HYDROCHLORIDE 5 MG/1
2.5-5 TABLET ORAL
Qty: 42 TABLET | Refills: 0 | Status: SHIPPED | OUTPATIENT
Start: 2022-02-10 | End: 2022-02-17

## 2022-02-10 RX ORDER — SODIUM CHLORIDE 9 MG/ML
125 INJECTION, SOLUTION INTRAVENOUS CONTINUOUS
Status: DISPENSED | OUTPATIENT
Start: 2022-02-10 | End: 2022-02-11

## 2022-02-10 RX ORDER — ACETAMINOPHEN 500 MG
500 TABLET ORAL
Status: DISCONTINUED | OUTPATIENT
Start: 2022-02-10 | End: 2022-02-11 | Stop reason: HOSPADM

## 2022-02-10 RX ORDER — BUPIVACAINE HYDROCHLORIDE 5 MG/ML
INJECTION, SOLUTION EPIDURAL; INTRACAUDAL
Status: COMPLETED | OUTPATIENT
Start: 2022-02-10 | End: 2022-02-10

## 2022-02-10 RX ORDER — LIDOCAINE HYDROCHLORIDE 10 MG/ML
0.1 INJECTION, SOLUTION EPIDURAL; INFILTRATION; INTRACAUDAL; PERINEURAL AS NEEDED
Status: DISCONTINUED | OUTPATIENT
Start: 2022-02-10 | End: 2022-02-10 | Stop reason: HOSPADM

## 2022-02-10 RX ORDER — FENTANYL CITRATE 50 UG/ML
25 INJECTION, SOLUTION INTRAMUSCULAR; INTRAVENOUS
Status: DISCONTINUED | OUTPATIENT
Start: 2022-02-10 | End: 2022-02-10 | Stop reason: HOSPADM

## 2022-02-10 RX ORDER — SODIUM CHLORIDE, SODIUM LACTATE, POTASSIUM CHLORIDE, CALCIUM CHLORIDE 600; 310; 30; 20 MG/100ML; MG/100ML; MG/100ML; MG/100ML
INJECTION, SOLUTION INTRAVENOUS
Status: DISCONTINUED | OUTPATIENT
Start: 2022-02-10 | End: 2022-02-10 | Stop reason: HOSPADM

## 2022-02-10 RX ORDER — ONDANSETRON 2 MG/ML
4 INJECTION INTRAMUSCULAR; INTRAVENOUS AS NEEDED
Status: DISCONTINUED | OUTPATIENT
Start: 2022-02-10 | End: 2022-02-10 | Stop reason: HOSPADM

## 2022-02-10 RX ORDER — OXYCODONE HYDROCHLORIDE 5 MG/1
2.5 TABLET ORAL
Status: DISCONTINUED | OUTPATIENT
Start: 2022-02-10 | End: 2022-02-11 | Stop reason: HOSPADM

## 2022-02-10 RX ORDER — ASPIRIN 81 MG/1
81 TABLET ORAL EVERY 12 HOURS
Status: DISCONTINUED | OUTPATIENT
Start: 2022-02-10 | End: 2022-02-11 | Stop reason: HOSPADM

## 2022-02-10 RX ORDER — SODIUM CHLORIDE 0.9 % (FLUSH) 0.9 %
5-40 SYRINGE (ML) INJECTION EVERY 8 HOURS
Status: DISCONTINUED | OUTPATIENT
Start: 2022-02-10 | End: 2022-02-10 | Stop reason: HOSPADM

## 2022-02-10 RX ORDER — ROPIVACAINE HYDROCHLORIDE 5 MG/ML
30 INJECTION, SOLUTION EPIDURAL; INFILTRATION; PERINEURAL AS NEEDED
Status: DISCONTINUED | OUTPATIENT
Start: 2022-02-10 | End: 2022-02-10 | Stop reason: HOSPADM

## 2022-02-10 RX ORDER — BUPIVACAINE HYDROCHLORIDE 5 MG/ML
INJECTION, SOLUTION EPIDURAL; INTRACAUDAL AS NEEDED
Status: DISCONTINUED | OUTPATIENT
Start: 2022-02-10 | End: 2022-02-10 | Stop reason: HOSPADM

## 2022-02-10 RX ORDER — MIDAZOLAM HYDROCHLORIDE 1 MG/ML
0.5 INJECTION, SOLUTION INTRAMUSCULAR; INTRAVENOUS
Status: DISCONTINUED | OUTPATIENT
Start: 2022-02-10 | End: 2022-02-10 | Stop reason: HOSPADM

## 2022-02-10 RX ORDER — HYDROMORPHONE HYDROCHLORIDE 1 MG/ML
0.5 INJECTION, SOLUTION INTRAMUSCULAR; INTRAVENOUS; SUBCUTANEOUS
Status: DISCONTINUED | OUTPATIENT
Start: 2022-02-10 | End: 2022-02-10 | Stop reason: HOSPADM

## 2022-02-10 RX ORDER — AMOXICILLIN 250 MG
1 CAPSULE ORAL
Qty: 60 TABLET | Refills: 0 | Status: SHIPPED | OUTPATIENT
Start: 2022-02-10

## 2022-02-10 RX ORDER — ACETAMINOPHEN 500 MG
1000 TABLET ORAL ONCE
Status: COMPLETED | OUTPATIENT
Start: 2022-02-10 | End: 2022-02-10

## 2022-02-10 RX ORDER — ROPIVACAINE HYDROCHLORIDE 5 MG/ML
INJECTION, SOLUTION EPIDURAL; INFILTRATION; PERINEURAL
Status: COMPLETED | OUTPATIENT
Start: 2022-02-10 | End: 2022-02-10

## 2022-02-10 RX ORDER — OXYCODONE HYDROCHLORIDE 5 MG/1
5 TABLET ORAL
Status: DISCONTINUED | OUTPATIENT
Start: 2022-02-10 | End: 2022-02-11 | Stop reason: HOSPADM

## 2022-02-10 RX ORDER — DOXYCYCLINE HYCLATE 100 MG
100 TABLET ORAL
Status: DISCONTINUED | OUTPATIENT
Start: 2022-02-10 | End: 2022-02-11 | Stop reason: HOSPADM

## 2022-02-10 RX ORDER — FACIAL-BODY WIPES
10 EACH TOPICAL DAILY PRN
Status: DISCONTINUED | OUTPATIENT
Start: 2022-02-12 | End: 2022-02-11 | Stop reason: HOSPADM

## 2022-02-10 RX ORDER — CELECOXIB 200 MG/1
200 CAPSULE ORAL ONCE
Status: COMPLETED | OUTPATIENT
Start: 2022-02-10 | End: 2022-02-10

## 2022-02-10 RX ORDER — ACETAMINOPHEN 325 MG/1
650 TABLET ORAL ONCE
Status: DISCONTINUED | OUTPATIENT
Start: 2022-02-10 | End: 2022-02-10 | Stop reason: HOSPADM

## 2022-02-10 RX ORDER — POLYETHYLENE GLYCOL 3350 17 G/17G
17 POWDER, FOR SOLUTION ORAL DAILY
Status: DISCONTINUED | OUTPATIENT
Start: 2022-02-10 | End: 2022-02-11 | Stop reason: HOSPADM

## 2022-02-10 RX ORDER — INSULIN LISPRO 100 [IU]/ML
INJECTION, SOLUTION INTRAVENOUS; SUBCUTANEOUS
Status: DISCONTINUED | OUTPATIENT
Start: 2022-02-10 | End: 2022-02-11 | Stop reason: HOSPADM

## 2022-02-10 RX ORDER — HYDROMORPHONE HYDROCHLORIDE 1 MG/ML
0.5 INJECTION, SOLUTION INTRAMUSCULAR; INTRAVENOUS; SUBCUTANEOUS
Status: ACTIVE | OUTPATIENT
Start: 2022-02-10 | End: 2022-02-11

## 2022-02-10 RX ORDER — MAGNESIUM SULFATE 100 %
4 CRYSTALS MISCELLANEOUS AS NEEDED
Status: DISCONTINUED | OUTPATIENT
Start: 2022-02-10 | End: 2022-02-11 | Stop reason: HOSPADM

## 2022-02-10 RX ORDER — PROPOFOL 10 MG/ML
INJECTION, EMULSION INTRAVENOUS
Status: DISCONTINUED | OUTPATIENT
Start: 2022-02-10 | End: 2022-02-10 | Stop reason: HOSPADM

## 2022-02-10 RX ORDER — PREGABALIN 75 MG/1
75 CAPSULE ORAL ONCE
Status: COMPLETED | OUTPATIENT
Start: 2022-02-10 | End: 2022-02-10

## 2022-02-10 RX ADMIN — PROPOFOL 75 MCG/KG/MIN: 10 INJECTION, EMULSION INTRAVENOUS at 07:25

## 2022-02-10 RX ADMIN — SODIUM CHLORIDE, POTASSIUM CHLORIDE, SODIUM LACTATE AND CALCIUM CHLORIDE 100 ML/HR: 600; 310; 30; 20 INJECTION, SOLUTION INTRAVENOUS at 10:08

## 2022-02-10 RX ADMIN — DEXMEDETOMIDINE HYDROCHLORIDE 4 MCG: 100 INJECTION, SOLUTION, CONCENTRATE INTRAVENOUS at 09:11

## 2022-02-10 RX ADMIN — BUPIVACAINE HYDROCHLORIDE 10 MG: 5 INJECTION, SOLUTION EPIDURAL; INTRACAUDAL at 07:28

## 2022-02-10 RX ADMIN — ACETAMINOPHEN 500 MG: 500 TABLET ORAL at 13:06

## 2022-02-10 RX ADMIN — SODIUM CHLORIDE, PRESERVATIVE FREE 10 ML: 5 INJECTION INTRAVENOUS at 22:00

## 2022-02-10 RX ADMIN — KETOROLAC TROMETHAMINE 15 MG: 30 INJECTION, SOLUTION INTRAMUSCULAR; INTRAVENOUS at 16:13

## 2022-02-10 RX ADMIN — ROPIVACAINE HYDROCHLORIDE 30 ML: 5 INJECTION, SOLUTION EPIDURAL; INFILTRATION; PERINEURAL at 07:23

## 2022-02-10 RX ADMIN — WATER 2 G: 1 INJECTION INTRAMUSCULAR; INTRAVENOUS; SUBCUTANEOUS at 07:40

## 2022-02-10 RX ADMIN — DEXMEDETOMIDINE HYDROCHLORIDE 4 MCG: 100 INJECTION, SOLUTION, CONCENTRATE INTRAVENOUS at 08:16

## 2022-02-10 RX ADMIN — DEXMEDETOMIDINE HYDROCHLORIDE 4 MCG: 100 INJECTION, SOLUTION, CONCENTRATE INTRAVENOUS at 07:46

## 2022-02-10 RX ADMIN — ACETAMINOPHEN 500 MG: 500 TABLET ORAL at 19:40

## 2022-02-10 RX ADMIN — SODIUM CHLORIDE, POTASSIUM CHLORIDE, SODIUM LACTATE AND CALCIUM CHLORIDE 100 ML/HR: 600; 310; 30; 20 INJECTION, SOLUTION INTRAVENOUS at 06:54

## 2022-02-10 RX ADMIN — DEXMEDETOMIDINE HYDROCHLORIDE 4 MCG: 100 INJECTION, SOLUTION, CONCENTRATE INTRAVENOUS at 08:01

## 2022-02-10 RX ADMIN — BUPIVACAINE HYDROCHLORIDE 10 MG: 5 INJECTION, SOLUTION EPIDURAL; INTRACAUDAL; PERINEURAL at 07:28

## 2022-02-10 RX ADMIN — TRANEXAMIC ACID 1 G: 100 INJECTION, SOLUTION INTRAVENOUS at 07:40

## 2022-02-10 RX ADMIN — PREGABALIN 75 MG: 75 CAPSULE ORAL at 06:38

## 2022-02-10 RX ADMIN — POLYETHYLENE GLYCOL 3350 17 G: 17 POWDER, FOR SOLUTION ORAL at 19:40

## 2022-02-10 RX ADMIN — DEXMEDETOMIDINE HYDROCHLORIDE 4 MCG: 100 INJECTION, SOLUTION, CONCENTRATE INTRAVENOUS at 07:55

## 2022-02-10 RX ADMIN — DEXMEDETOMIDINE HYDROCHLORIDE 4 MCG: 100 INJECTION, SOLUTION, CONCENTRATE INTRAVENOUS at 08:40

## 2022-02-10 RX ADMIN — SENNOSIDES AND DOCUSATE SODIUM 1 TABLET: 8.6; 5 TABLET ORAL at 13:06

## 2022-02-10 RX ADMIN — SODIUM CHLORIDE, POTASSIUM CHLORIDE, SODIUM LACTATE AND CALCIUM CHLORIDE: 600; 310; 30; 20 INJECTION, SOLUTION INTRAVENOUS at 07:23

## 2022-02-10 RX ADMIN — DEXMEDETOMIDINE HYDROCHLORIDE 4 MCG: 100 INJECTION, SOLUTION, CONCENTRATE INTRAVENOUS at 08:52

## 2022-02-10 RX ADMIN — SODIUM CHLORIDE, PRESERVATIVE FREE 10 ML: 5 INJECTION INTRAVENOUS at 14:00

## 2022-02-10 RX ADMIN — SODIUM CHLORIDE 125 ML/HR: 900 INJECTION, SOLUTION INTRAVENOUS at 10:08

## 2022-02-10 RX ADMIN — OXYCODONE 5 MG: 5 TABLET ORAL at 13:06

## 2022-02-10 RX ADMIN — DEXMEDETOMIDINE HYDROCHLORIDE 4 MCG: 100 INJECTION, SOLUTION, CONCENTRATE INTRAVENOUS at 07:52

## 2022-02-10 RX ADMIN — SODIUM CHLORIDE, POTASSIUM CHLORIDE, SODIUM LACTATE AND CALCIUM CHLORIDE: 600; 310; 30; 20 INJECTION, SOLUTION INTRAVENOUS at 09:35

## 2022-02-10 RX ADMIN — OXYCODONE 5 MG: 5 TABLET ORAL at 16:13

## 2022-02-10 RX ADMIN — LIDOCAINE HYDROCHLORIDE 0.1 ML: 10 INJECTION, SOLUTION EPIDURAL; INFILTRATION; INTRACAUDAL; PERINEURAL at 06:54

## 2022-02-10 RX ADMIN — MIDAZOLAM 2 MG: 1 INJECTION INTRAMUSCULAR; INTRAVENOUS at 07:11

## 2022-02-10 RX ADMIN — FENTANYL CITRATE 50 MCG: 50 INJECTION, SOLUTION INTRAMUSCULAR; INTRAVENOUS at 07:11

## 2022-02-10 RX ADMIN — ACETAMINOPHEN 1000 MG: 500 TABLET ORAL at 06:38

## 2022-02-10 RX ADMIN — DEXMEDETOMIDINE HYDROCHLORIDE 4 MCG: 100 INJECTION, SOLUTION, CONCENTRATE INTRAVENOUS at 07:47

## 2022-02-10 RX ADMIN — CELECOXIB 200 MG: 200 CAPSULE ORAL at 06:38

## 2022-02-10 RX ADMIN — SENNOSIDES AND DOCUSATE SODIUM 1 TABLET: 8.6; 5 TABLET ORAL at 19:40

## 2022-02-10 RX ADMIN — LIDOCAINE HYDROCHLORIDE 40 MG: 20 INJECTION, SOLUTION EPIDURAL; INFILTRATION; INTRACAUDAL; PERINEURAL at 07:25

## 2022-02-10 RX ADMIN — WATER 2 G: 1 INJECTION INTRAMUSCULAR; INTRAVENOUS; SUBCUTANEOUS at 16:13

## 2022-02-10 NOTE — PROGRESS NOTES
TRANSFER - OUT REPORT:    Verbal report given to Shayna Ramsay RN(name) on Jasen Holt  being transferred to (unit) for routine post - op       Report consisted of patients Situation, Background, Assessment and   Recommendations(SBAR). Information from the following report(s) SBAR, Kardex, OR Summary, Procedure Summary, Intake/Output and MAR was reviewed with the receiving nurse. Lines:   Peripheral IV 02/10/22 Anterior;Right Arm (Active)   Site Assessment Clean, dry, & intact 02/10/22 1030   Phlebitis Assessment 0 02/10/22 1030   Infiltration Assessment 0 02/10/22 1030   Dressing Status Clean, dry, & intact 02/10/22 1030   Dressing Type Transparent 02/10/22 1030   Hub Color/Line Status Green 02/10/22 1030        Opportunity for questions and clarification was provided.       Patient transported with:   [employer]

## 2022-02-10 NOTE — ANESTHESIA PROCEDURE NOTES
Peripheral Block    Start time: 2/10/2022 7:13 AM  End time: 2/10/2022 7:23 AM  Performed by: Delgado Izaguirre MD  Authorized by: Delgado Izaguirre MD       Pre-procedure: Indications: at surgeon's request and post-op pain management    Preanesthetic Checklist: patient identified, risks and benefits discussed, site marked, timeout performed, anesthesia consent given and patient being monitored      Block Type:   Block Type:   Adductor canal  Laterality:  Right  Monitoring:  Standard ASA monitoring, responsive to questions, oxygen, continuous pulse ox, frequent vital sign checks and heart rate  Injection Technique:  Single shot  Procedures: ultrasound guided    Patient Position: supine  Prep: chlorhexidine    Location:  Mid thigh  Needle Type:  Stimuplex  Needle Gauge:  22 G  Needle Localization:  Ultrasound guidance  Medication Injected:  Ropivacaine (PF) (NAROPIN)(0.5%) 5 mg/mL injection, 30 mL    Assessment:  Number of attempts:  1  Injection Assessment:  Incremental injection every 5 mL, local visualized surrounding nerve on ultrasound, negative aspiration for blood, no intravascular symptoms, no paresthesia and ultrasound image on chart  Patient tolerance:  Patient tolerated the procedure well with no immediate complications

## 2022-02-10 NOTE — PROGRESS NOTES
Problem: Mobility Impaired (Adult and Pediatric)  Goal: *Acute Goals and Plan of Care (Insert Text)  Description: FUNCTIONAL STATUS PRIOR TO ADMISSION: Patient was independent and active without use of DME.    HOME SUPPORT PRIOR TO ADMISSION: The patient lived with wife but did not require assist.    Physical Therapy Goals  Initiated 2/10/2022    1. Patient will move from supine to sit and sit to supine  and scoot up and down in bed with modified independence within 4 days. 2. Patient will perform sit to stand with modified independence within 4 days. 3. Patient will ambulate with modified independence for 150 feet with the least restrictive device within 4 days. 4. Patient will ascend/descend 12 stairs with 1 handrail(s) with modified independence within 4 days. 5. Patient will perform home exercise program per protocol with modified independence within 4 days. 6. Patient will demonstrate AROM 0-90 degrees in operative joint within 4 days. Outcome: Progressing Towards Goal     PHYSICAL THERAPY EVALUATION  Patient: Jesus Millan (86 y.o. male)  Date: 2/10/2022  Primary Diagnosis: Primary osteoarthritis of right knee [M17.11]  Primary localized osteoarthritis of right knee [M17.11]  Procedure(s) (LRB):  RIGHT TOTAL KNEE ARTHROPLASTY (SPINAL W/BLOCK W/IV SEDATION) (Right) Day of Surgery   Precautions:  WBAT    ASSESSMENT  Based on the objective data described below, the patient presents with decreased R knee ROM, R knee strength, and overall limited functional mobility s/p R TKR day 0. Patient doing very well this afternoon and eager to mobilize. Patient's blood pressure slightly elevated with upright mobility, however, asymptomatic throughout session. Patient amb x 40 ft with RW with CGA overall. Patient is moving at [facility] level. Patient demonstrated understanding of supine HEP, however, will need continued reinforcement.       Current Level of Function Impacting Discharge (mobility/balance): Bed mobility SBA, Transfers CGA, and amb x 40 ft RW with CGA overall    Functional Outcome Measure: The patient scored 50 on the Barthel outcome measure which is indicative of 50% impairment of ADLs and function POD #0 R TKR. Other factors to consider for discharge: Lives with wife who can assist if needed upon discharge home. Patient will benefit from skilled therapy intervention to address the above noted impairments. PLAN :  Recommendations and Planned Interventions: bed mobility training, transfer training, gait training, therapeutic exercises, neuromuscular re-education, modalities, edema management/control, patient and family training/education, and therapeutic activities      Frequency/Duration: Patient will be followed by physical therapy:  twice daily to address goals. Recommendation for discharge: (in order for the patient to meet his/her long term goals)  Physical therapy at least 2 days/week in the home     This discharge recommendation:  Has not yet been discussed the attending provider and/or case management    IF patient discharges home will need the following DME: to be determined (TBD) Patient owns quad cane and RW         SUBJECTIVE:   Patient stated I am feeling great.     OBJECTIVE DATA SUMMARY:   HISTORY:    Past Medical History:   Diagnosis Date    Arthralgia of knee 7/15/2013    Diabetes ([address]     type 2    Diverticular disease of left colon 3/22/2013    GERD (gastroesophageal reflux disease)     Gout 12/6/1985    Hypertension     SLIGHTLY PER PT'S PCP     Past Surgical History:   Procedure Laterality Date    HX COLONOSCOPY  2000    Dr. Matt Trinidad    HX HEENT      deviated septum age 12     [address] [facility] [address]     HX OTHER SURGICAL  1/21/16    excision of lymph node right axilla    HX TONSILLECTOMY      child       Personal factors and/or comorbidities impacting plan of care: Lives with wife, Sol Donahue Environment: Private residence  # Steps to Enter: 3  Rails to Enter: No  Wheelchair Ramp: No  One/Two Story Residence: Two story, live on 1st floor  Living Alone: No  Support Systems: Spouse/Significant Other  Patient Expects to be Discharged to[de-identified] Home  Current DME Used/Available at Home: Walker, rolling,Cane, quad  Tub or Shower Type: Shower    EXAMINATION/PRESENTATION/DECISION MAKING:   Critical Behavior:  Neurologic State: Alert  Orientation Level: Oriented X4  Cognition: Appropriate decision making,Appropriate for age attention/concentration  Safety/Judgement: Awareness of environment,Fall prevention  Hearing: Auditory  Auditory Impairment: None  Skin:  Ace bandage covering operative R knee, unable to assess  Edema: Ace bandage on R knee, unable to fully assess edema  Range Of Motion:  AROM: Generally decreased, functional (R knee limited)        Strength:    Strength: Generally decreased, functional           Tone & Sensation:   Tone: Normal              Sensation: Intact               Coordination:  Coordination: Generally decreased, functional    Functional Mobility:  Bed Mobility:  Rolling: Stand-by assistance  Supine to Sit: Stand-by assistance  Sit to Supine: Stand-by assistance  Scooting: Stand-by assistance  Transfers:  Sit to Stand: Contact guard assistance  Stand to Sit: Contact guard assistance                 Balance:   Sitting: Intact  Standing: Intact; With support  Ambulation/Gait Training:  Distance (ft): 40 Feet (ft)  Assistive Device: Gait belt;Walker, rolling        Gait Description (WDL): Exceptions to WDL     Right Side Weight Bearing: As tolerated  Left Side Weight Bearing: Full  Base of Support: Widened     Speed/Erin: Slow  Step Length: Left shortened;Right shortened             Therapeutic Exercises:   Reviewed supine exercises:   Ankle pumps  Quad sets  Ham sets  Heel slides   *Instructed to perform 3-5 x per every hour    Functional Measure:  Barthel Index:    Bathin  Bladder: 10  Bowels: 10  Groomin  Dressing: 10  Feeding: 10  Mobility: 0  Stairs: 0  Toilet Use: 0  Transfer (Bed to Chair and Back): 0  Total: 50/100       The Barthel ADL Index: Guidelines  1. The index should be used as a record of what a patient does, not as a record of what a patient could do. 2. The main aim is to establish degree of independence from any help, physical or verbal, however minor and for whatever reason. 3. The need for supervision renders the patient not independent. 4. A patient's performance should be established using the best available evidence. Asking the patient, friends/relatives and nurses are the usual sources, but direct observation and common sense are also important. However direct testing is not needed. 5. Usually the patient's performance over the preceding 24-48 hours is important, but occasionally longer periods will be relevant. 6. Middle categories imply that the patient supplies over 50 per cent of the effort. 7. Use of aids to be independent is allowed. Score Interpretation (from [address] [address])    Independent   60-79 Minimally independent   40-59 Partially dependent   20-39 Very dependent   <20 Totally dependent     -Brenton Doherty., Barthel, DCherelleW. (1965). Functional evaluation: the Barthel Index. [address] [facility] ([address] (). The Barthel activities of daily living index: self-reporting versus actual performance in the old (> or = 75 years). Journal of [address] [place] [address] 45(7), [address] [facility], TARA, Karla Victoria., [facility]. (). Measuring the change in disability after inpatient rehabilitation; comparison of the responsiveness of the Barthel Index and Functional [place] Measure. Journal of Neurology, Neurosurgery, and Psychiatry, 66(4), 305-151. Zachary Angel, N.J.A, ALLISON Sampson.MARCELINO, & Frank Penn MCherelleA. (2004) Assessment of post-stroke quality of life in cost-effectiveness studies:  The usefulness of the Barthel Index and the EuroQoL-5D. Quality of Life Research, 15, 828-71           Physical Therapy Evaluation Charge Determination   History Examination Presentation Decision-Making   MEDIUM  Complexity : 1-2 comorbidities / personal factors will impact the outcome/ POC  MEDIUM Complexity : 3 Standardized tests and measures addressing body structure, function, activity limitation and / or participation in recreation  MEDIUM Complexity : Evolving with changing characteristics  MEDIUM Complexity : FOTO score of 26-74      Based on the above components, the patient evaluation is determined to be of the following complexity level: MEDIUM    Pain Ratin/10    Activity Tolerance:   Good    After treatment patient left in no apparent distress:   Supine in bed, Call bell within reach, and Caregiver / family present    COMMUNICATION/EDUCATION:   The patients plan of care was discussed with: Registered nurse and Rehabilitation technician. Fall prevention education was provided and the patient/caregiver indicated understanding. and Patient/family have participated as able in goal setting and plan of care.     Thank you for this referral.  Lalita Araya, PT   Time Calculation: 30 mins

## 2022-02-10 NOTE — DISCHARGE INSTRUCTIONS
Post-op Discharge Instructions Following Total Joint Replacement  Justo Hudson MD  [address]  [phone]  Marcello Neighbor See Dr. Toan Garduno approximately 3-4 weeks from date of surgery. Call [phone] to make an appointment.  Call Kareem Larios RN if you have questions or concerns, [phone]. Activity   Use your walker for ambulation. Weight bearing as tolerated unless instructed otherwise by the physical therapist. Get up every hour you are awake and take a brief walk. Lengthen walking distance daily as your strength improves.  Continue using your walker until seen in the office for your first follow up visit.  Practice your exercises 3 times daily as instructed by the physical therapist. Mark Anthony Dye for 20 minutes after exercising.  No driving until seen in the office for your first follow up visit. Incision Care   The light brown Aquacel surgical dressing is waterproof and is to remain on your incision for 7 days. On the 7th day, carefully lift the edge of the dressing to break the adhesive seal and gently peel it off.  If your Aquacel dressings comes loose or falls off before the 7th day, replace it with a dry sterile gauze dressing and change this dressing daily. Once there is no drainage on the bandage, you mean leave the incision open to air.  You may take a shower with the Aquacel dressing in place. After you remove the Aquacel dressing on day 7, you may continue to shower and get your incision wet in the shower. Do not submerge your incision under water in a bathtub, hot tub, swimming pool, etc. until after you have been evaluated at your first office visit. Medications   Blood Clot Prevention: Take medication as prescribed by your physician for 4 weeks postop.  Pain Management: Take pain medication as prescribed; wean yourself off of pain medication as your pain lessens. Take with food.  You make also take Tylenol every 4-6 hours as needed for pain.   Do not exceed 3 grams (3000mg) per day.  Place an ice bag on or around the incision for 20 minutes on / 20 minutes off as needed throughout the day and night, especially after exercising.  Stool Softener: You may want to take a stool softener (such as Senokot-S or Colace) to prevent constipation while taking pain medication. If constipation occurs, you may also use a laxative (such as Dulcolax tablets, Miralax, or a suppository). Diet   Resume usual diet at home. Drink plenty of fluids. Eat foods high in fiber and protein. Calcium and Vitamin D supplements recommended. Avoid alcoholic beverages. No smoking. When to call your Orthopaedic Surgeon: If you call after 5pm or on a weekend, the on call physician will return your call   Pain that is not relieved by pain medication, ice, and activity modification   Signs of infection (red incision, continuous drainage from the incision, malodorous drainage, persistent fever greater than 101 degrees Fahrenheit)   Signs of a blood clot in your leg (calf pain, tenderness, redness, and/or swelling of the lower leg)  ?   When to call your Primary Care Physician   Concerns about your medical conditions such as diabetes, high blood pressure, asthma, congestive heart failure   Call if blood sugars are elevated, if you have a persistent headache or dizziness, coughing or congestion, constipation or diarrhea, burning with urination, abnormal heart rate (fast or slow)  When to call 911 and go to the nearest Emergency Room   Acute onset of chest pain, shortness of breath, difficulty breathing

## 2022-02-10 NOTE — ANESTHESIA PROCEDURE NOTES
Spinal Block    Start time: 2/10/2022 7:26 AM  End time: 2/10/2022 7:28 AM  Performed by: Rosanna Burk MD  Authorized by: Rosanna Burk MD     Pre-procedure:   Indications: primary anesthetic      Spinal Block:   Patient Position:  Seated    Prep: Betadine      Location:  L2-3  Technique:  Single shot    Local Dose (mL):  10    Needle:   Needle Type:  Pencan  Needle Gauge:  24 G  Attempts:  1      Events: CSF confirmed, no blood with aspiration and no paresthesia        Assessment:  Insertion:  Uncomplicated  Patient tolerance:  Patient tolerated the procedure well with no immediate complications

## 2022-02-10 NOTE — PROGRESS NOTES
Ortho NP Note    POD# 0  s/p RIGHT TOTAL KNEE ARTHROPLASTY (SPINAL W/BLOCK W/IV SEDATION)     Pt resting in bed with wife at bedside. Denies pain at present; states he feels the block is still working. VSS Afebrile. Patient has had something to eat/drink. No nausea. Most Recent Labs:   Lab Results   Component Value Date/Time    HGB 12.8 01/13/2022 08:59 AM    Hemoglobin A1c 6.2 (H) 01/13/2022 08:59 AM       Body mass index is 31.24 kg/m². Reference: BMI greater than 30 is classified as obesity and greater than 40 is classified as morbid obesity. Voiding status: pending void    Ace wrap + gauze to R LE c.d.i. Cryotherapy in place over incision. Bilateral LEs warm, dry. 1+ DP pulses  Sensation and motor intact. DF/PF/EHL 4+/5. SCDs for mechanical DVT proph    Plan:  1) PT: starting today, WBAT  2) Greer-op Antibiotics Ancef  3) Pain:  Received tylenol, lyrica, Celebrex. in preop. Perioperative anesthesia: Spinal and adductor canal block. Post operative analgesia includes scheduled tylenol, toradol and PRN oxycodone PO, IV dilaudid  4) DVT Prophylaxis:  Aspirin 81 mg PO BID. Encouraged early mobilization, bed exercises, and SCD use. 5) Hx of HTN: Current /82, HR: 70.  Resume home Prinzide (lisinopril/hctz)    6) Hx of glucose intolerance: Pre op HGBA1C: 6.2. Using metformin at home. Discussed importance of glycemic control. Plan for sliding scale lispro ACHS.   7) Discharge plans: to home with HHPT and family support       Samir Reagan NP

## 2022-02-10 NOTE — ANESTHESIA PREPROCEDURE EVALUATION
Relevant Problems   No relevant active problems       Anesthetic History   No history of anesthetic complications            Review of Systems / Medical History  Patient summary reviewed, nursing notes reviewed and pertinent labs reviewed    Pulmonary  Within defined limits                 Neuro/Psych   Within defined limits           Cardiovascular  Within defined limits  Hypertension              Exercise tolerance: >4 METS     GI/Hepatic/Renal  Within defined limits   GERD           Endo/Other  Within defined limits  Diabetes    Arthritis     Other Findings              Physical Exam    Airway  Mallampati: II  TM Distance: > 6 cm  Neck ROM: normal range of motion   Mouth opening: Normal     Cardiovascular  Regular rate and rhythm,  S1 and S2 normal,  no murmur, click, rub, or gallop             Dental  No notable dental hx       Pulmonary  Breath sounds clear to auscultation               Abdominal  GI exam deferred       Other Findings            Anesthetic Plan    ASA: 2  Anesthesia type: spinal      Post-op pain plan if not by surgeon: peripheral nerve block single    Induction: Intravenous  Anesthetic plan and risks discussed with: Patient

## 2022-02-10 NOTE — ANESTHESIA POSTPROCEDURE EVALUATION
Procedure(s):  RIGHT TOTAL KNEE ARTHROPLASTY (SPINAL W/BLOCK W/IV SEDATION). spinal    Anesthesia Post Evaluation        Patient location during evaluation: PACU  Note status: Adequate. Level of consciousness: responsive to verbal stimuli and sleepy but conscious  Pain management: satisfactory to patient  Airway patency: patent  Anesthetic complications: no  Cardiovascular status: acceptable  Respiratory status: acceptable  Hydration status: acceptable  Comments: +Post-Anesthesia Evaluation and Assessment    Patient: Barb San MRN: [id number]  SSN: [id number]   YOB: 1955  Age: 78 y.o. Sex: male          Cardiovascular Function/Vital Signs    /77   Pulse 66   Temp (!) 0 °C (32 °F)   Resp 15   Ht 5' 11\" (1.803 m)   Wt 101.6 kg (224 lb)   SpO2 99%   BMI 31.24 kg/m²     Patient is status post Procedure(s):  RIGHT TOTAL KNEE ARTHROPLASTY (SPINAL W/BLOCK W/IV SEDATION). Nausea/Vomiting: Controlled. Postoperative hydration reviewed and adequate. Pain:  Pain Scale 1: Numeric (0 - 10) (02/10/22 1030)  Pain Intensity 1: 0 (02/10/22 1030)   Managed. Neurological Status:   Neuro (WDL): Within Defined Limits (02/10/22 1030)   At baseline. Mental Status and Level of Consciousness: Arousable. Pulmonary Status:   O2 Device: None (Room air) (02/10/22 1030)   Adequate oxygenation and airway patent. Complications related to anesthesia: None    Post-anesthesia assessment completed. No concerns. I have evaluated the patient and the patient is stable and ready to be discharged from PACU . Signed By: Nedra Zavala MD    2/10/2022        INITIAL Post-op Vital signs:   Vitals Value Taken Time   /71 02/10/22 1115   Temp 36.4 °C (97.6 °F) 02/10/22 1030   Pulse 60 02/10/22 1124   Resp 13 02/10/22 1124   SpO2 98 % 02/10/22 1123   Vitals shown include unvalidated device data.

## 2022-02-10 NOTE — DISCHARGE SUMMARY
[facility]     DISCHARGE SUMMARY     Name: Helen Pfeiffer       MR#: [id number]    : 1955  ADMIT DATE: 2/10/2022  DISCHARGE DATE: 2022     ADMISSION DIAGNOSIS: Primary osteoarthritis of right knee [M17.11]  Primary localized osteoarthritis of right knee [M17.11]     DISCHARGE DIAGNOSIS: Primary osteoarthritis of right knee [M17.11]     PROCEDURE PERFORMED: Procedure(s):  RIGHT TOTAL KNEE ARTHROPLASTY (SPINAL W/BLOCK W/IV SEDATION)     CONSULTATIONS:  None.     HISTORY OF PRESENT ILLNESS:  The patient is a 48-year-old gentleman with progressive right knee pain due to severe osteoarthritis. Symptoms have progressed despite comprehensive conservative treatment. He presents for right total knee replacement. Risks, benefits, and alternatives of procedure were reviewed with him in detail and he desires to proceed.     HOSPITAL COURSE:  The patient underwent the aforementioned procedure on date of admission under spinal anesthesia with adductor canal block. There were no immediate postoperative complications. He was started on a multimodal pain regimen and DVT prophylaxis.     DISPOSITION: The patient made slow, steady progress with physical therapy and was appropriate for discharge to Home in stable condition on postoperative day 1. DISCHARGE MEDICATIONS:  Reinitiate preadmission medications. In addition, the patient will be on ASA for DVT prophylaxis and low dose oxycodone and Tylenol for pain. DISCHARGE INSTRUCTIONS:  Detailed printed instructions were provided to the patient. Follow up with Dr. Lindy Jj in approximately 3 weeks. The patient will receive home health physical therapy in the meantime.     Signed by: Kip Ornelas PA-C  2022

## 2022-02-10 NOTE — BRIEF OP NOTE
Brief Postoperative Note    Patient: Evelyn Olivarez  YOB: 1955  MRN: [id number]    Date of Procedure: 2/10/2022     Pre-Op Diagnosis: Primary osteoarthritis of right knee [M17.11]    Post-Op Diagnosis: Same as preoperative diagnosis. Procedure(s):  RIGHT TOTAL KNEE ARTHROPLASTY (SPINAL W/BLOCK W/IV SEDATION)    Surgeon(s):  Maria Esther Santiago MD    Surgical Assistant: Physician Assistant: Miles Reyes PA-C  Surg Asst-1: Augustina Lefort D    Anesthesia: Spinal     Estimated Blood Loss (mL): 341     Complications: None    Specimens: * No specimens in log *     Implants:   Implant Name Type Inv.  Item Serial No.  Lot No. LRB No. Used Action   CEMENT BNE GENTAMICIN 40 GM SMARTSET GMV - SNA  CEMENT BNE GENTAMICIN 40 GM SMARTSET GMV NA [employer]_ [id number] Right 2 Implanted   COMPONENT FEM SZ 10 RT KNEE NP EMPOWR 3D - SNA  COMPONENT FEM SZ 10 RT KNEE NP EMPOWR 3D NA [employer] - DJO SURGICAL_ [id number] Right 1 Implanted   BASEPLATE TIB SZ 10 RT KNEE NP STEMMABLE EMPOWR - SNA  BASEPLATE TIB SZ 10 RT KNEE NP STEMMABLE EMPOWR NA [employer] - DJO SURGICAL_ [id number] Right 1 Implanted   INSERT TIB SZ 10 [id number] RT KNEE EMPOWR 3D E + - SNA  INSERT TIB SZ 10 [id number] RT KNEE EMPOWR 3D E + NA [employer] MEDICAL - DJO SURGICAL_ [id number] Right 1 Implanted   COMPONENT PATELLAR 3 PEG 32X8 MM KNEE DOMED POLYETH E-PLUS - SNA  COMPONENT PATELLAR 3 PEG 32X8 MM KNEE DOMED POLYETH E-PLUS NA [employer]let[employer]  SURGICAL_ [id number] Right 1 Implanted       Drains: * No LDAs found *    Findings: oa right knee    Electronically Signed by Neo Sanchez PA-C on 2/10/2022 at 9:34 AM

## 2022-02-11 VITALS
TEMPERATURE: 97.9 F | HEIGHT: 71 IN | OXYGEN SATURATION: 98 % | DIASTOLIC BLOOD PRESSURE: 70 MMHG | HEART RATE: 93 BPM | RESPIRATION RATE: 16 BRPM | SYSTOLIC BLOOD PRESSURE: 121 MMHG | BODY MASS INDEX: 31.36 KG/M2 | WEIGHT: 224 LBS

## 2022-02-11 LAB
ANION GAP SERPL CALC-SCNC: 5 MMOL/L (ref 5–15)
BUN SERPL-MCNC: 21 MG/DL (ref 6–20)
BUN/CREAT SERPL: 19 (ref 12–20)
CALCIUM SERPL-MCNC: 8.6 MG/DL (ref 8.5–10.1)
CHLORIDE SERPL-SCNC: 107 MMOL/L (ref 97–108)
CO2 SERPL-SCNC: 27 MMOL/L (ref 21–32)
CREAT SERPL-MCNC: 1.12 MG/DL (ref 0.7–1.3)
GLUCOSE BLD STRIP.AUTO-MCNC: 140 MG/DL (ref 65–117)
GLUCOSE SERPL-MCNC: 155 MG/DL (ref 65–100)
HGB BLD-MCNC: 10.6 G/DL (ref 12.1–17)
POTASSIUM SERPL-SCNC: 4.2 MMOL/L (ref 3.5–5.1)
SERVICE CMNT-IMP: ABNORMAL
SODIUM SERPL-SCNC: 139 MMOL/L (ref 136–145)

## 2022-02-11 PROCEDURE — 74011636637 HC RX REV CODE- 636/637: Performed by: PHYSICIAN ASSISTANT

## 2022-02-11 PROCEDURE — 27447 TOTAL KNEE ARTHROPLASTY: CPT | Performed by: ORTHOPAEDIC SURGERY

## 2022-02-11 PROCEDURE — 85018 HEMOGLOBIN: CPT

## 2022-02-11 PROCEDURE — 74011250636 HC RX REV CODE- 250/636: Performed by: PHYSICIAN ASSISTANT

## 2022-02-11 PROCEDURE — 82962 GLUCOSE BLOOD TEST: CPT

## 2022-02-11 PROCEDURE — 80048 BASIC METABOLIC PNL TOTAL CA: CPT

## 2022-02-11 PROCEDURE — 74011250637 HC RX REV CODE- 250/637: Performed by: ORTHOPAEDIC SURGERY

## 2022-02-11 PROCEDURE — 97530 THERAPEUTIC ACTIVITIES: CPT

## 2022-02-11 PROCEDURE — 74011250637 HC RX REV CODE- 250/637: Performed by: PHYSICIAN ASSISTANT

## 2022-02-11 PROCEDURE — 97116 GAIT TRAINING THERAPY: CPT

## 2022-02-11 PROCEDURE — 36415 COLL VENOUS BLD VENIPUNCTURE: CPT

## 2022-02-11 PROCEDURE — 74011000250 HC RX REV CODE- 250: Performed by: PHYSICIAN ASSISTANT

## 2022-02-11 PROCEDURE — 27447 TOTAL KNEE ARTHROPLASTY: CPT | Performed by: PHYSICIAN ASSISTANT

## 2022-02-11 RX ADMIN — ACETAMINOPHEN 500 MG: 500 TABLET ORAL at 06:43

## 2022-02-11 RX ADMIN — ASPIRIN 81 MG: 81 TABLET, COATED ORAL at 08:49

## 2022-02-11 RX ADMIN — ASPIRIN 81 MG: 81 TABLET, COATED ORAL at 00:07

## 2022-02-11 RX ADMIN — ACETAMINOPHEN 500 MG: 500 TABLET ORAL at 00:07

## 2022-02-11 RX ADMIN — POLYETHYLENE GLYCOL 3350 17 G: 17 POWDER, FOR SOLUTION ORAL at 08:49

## 2022-02-11 RX ADMIN — ACETAMINOPHEN 500 MG: 500 TABLET ORAL at 09:00

## 2022-02-11 RX ADMIN — KETOROLAC TROMETHAMINE 15 MG: 30 INJECTION, SOLUTION INTRAMUSCULAR; INTRAVENOUS at 06:43

## 2022-02-11 RX ADMIN — Medication 2 UNITS: at 07:30

## 2022-02-11 RX ADMIN — KETOROLAC TROMETHAMINE 15 MG: 30 INJECTION, SOLUTION INTRAMUSCULAR; INTRAVENOUS at 00:07

## 2022-02-11 RX ADMIN — SODIUM CHLORIDE, PRESERVATIVE FREE 10 ML: 5 INJECTION INTRAVENOUS at 06:00

## 2022-02-11 RX ADMIN — HYDROCHLOROTHIAZIDE 12.5 MG: 25 TABLET ORAL at 08:49

## 2022-02-11 RX ADMIN — DOXYCYCLINE HYCLATE 100 MG: 100 TABLET, COATED ORAL at 00:08

## 2022-02-11 RX ADMIN — LISINOPRIL 10 MG: 10 TABLET ORAL at 08:49

## 2022-02-11 RX ADMIN — SENNOSIDES AND DOCUSATE SODIUM 1 TABLET: 8.6; 5 TABLET ORAL at 08:49

## 2022-02-11 RX ADMIN — WATER 2 G: 1 INJECTION INTRAMUSCULAR; INTRAVENOUS; SUBCUTANEOUS at 00:00

## 2022-02-11 RX ADMIN — SODIUM CHLORIDE 125 ML/HR: 900 INJECTION, SOLUTION INTRAVENOUS at 00:09

## 2022-02-11 NOTE — PROGRESS NOTES
Problem: Mobility Impaired (Adult and Pediatric)  Goal: *Acute Goals and Plan of Care (Insert Text)  Description: FUNCTIONAL STATUS PRIOR TO ADMISSION: Patient was independent and active without use of DME.    HOME SUPPORT PRIOR TO ADMISSION: The patient lived with wife but did not require assist.    Physical Therapy Goals  Initiated 2/10/2022    1. Patient will move from supine to sit and sit to supine  and scoot up and down in bed with modified independence within 4 days. 2. Patient will perform sit to stand with modified independence within 4 days. 3. Patient will ambulate with modified independence for 150 feet with the least restrictive device within 4 days. 4. Patient will ascend/descend 12 stairs with 1 handrail(s) with modified independence within 4 days. 5. Patient will perform home exercise program per protocol with modified independence within 4 days. 6. Patient will demonstrate AROM 0-90 degrees in operative joint within 4 days. Outcome: Progressing Towards Goal         PHYSICAL THERAPY TREATMENT  Patient: Valentín Edge (54 y.o. male)  Date: 2/11/2022  Diagnosis: Primary osteoarthritis of right knee [M17.11]  Primary localized osteoarthritis of right knee [M17.11] <principal problem not specified>  Procedure(s) (LRB):  RIGHT TOTAL KNEE ARTHROPLASTY (SPINAL W/BLOCK W/IV SEDATION) (Right) 1 Day Post-Op  Precautions: WBAT  Chart, physical therapy assessment, plan of care and goals were reviewed. ASSESSMENT  Patient continues with skilled PT services and is progressing towards goals. Pt received sitting EOB and willing to work with therapy. Pt limited with little reported pain, decreased ROM and independence this session. Pt able to tolerate STS to RW with SBA, followed by amb to rehab gym for stair training. Pt able to perform 4 steps with BHR, followed by no HR with SPC with step to gait, CGA.  Pt continued with gait training in hallway around the unit with no LOB, noted hardly any UE support with RW. Upon return to room, pt was able to perform bed mobility with SBA. Pt reviewed and demo LE ex with min VC. Pt completed session with cold pack back on knee, call bell within reach and all needs met at the time with family in room. Rn notified of session and that pt was cleared upon d/c with HHPT. Current Level of Function Impacting Discharge (mobility/balance): SBA/CGA for all mobility, amb 300' with RW    Other factors to consider for discharge: none         PLAN :  Patient continues to benefit from skilled intervention to address the above impairments. Continue treatment per established plan of care. to address goals. Recommendation for discharge: (in order for the patient to meet his/her long term goals)  Physical therapy at least 2 days/week in the home     This discharge recommendation:  Has been made in collaboration with the attending provider and/or case management    IF patient discharges home will need the following DME: patient owns DME required for discharge       SUBJECTIVE:   Patient stated I walked the unit 3 times last night.     OBJECTIVE DATA SUMMARY:   Critical Behavior:  Neurologic State: Alert  Orientation Level: Oriented X4  Cognition: Appropriate decision making  Safety/Judgement: Awareness of environment,Fall prevention    Functional Mobility Training:  Bed Mobility:  Rolling: Stand-by assistance  Supine to Sit: Stand-by assistance  Scooting: Stand-by assistance    Transfers:  Sit to Stand: Contact guard assistance  Stand to Sit: Contact guard assistance     Balance:  Sitting: Intact  Standing: Impaired  Standing - Static: Constant support;Good  Standing - Dynamic : Constant support;Good    Ambulation/Gait Training:  Distance (ft): 300 Feet (ft)  Assistive Device: Gait belt;Walker, rolling  Ambulation - Level of Assistance: Stand-by assistance  Gait Abnormalities: Decreased step clearance  Right Side Weight Bearing: As tolerated  Base of Support: Widened  Speed/Erin: Slow;Pace decreased (<100 feet/min)  Step Length: Left shortened;Right shortened    Stairs:  Number of Stairs Trained: 8  Stairs - Level of Assistance: Contact guard assistance   Rail Use: None (SPC)    Therapeutic Exercises:     EXERCISE   Sets   Reps   Active Active Assist   Passive Self ROM   Comments   Ankle Pumps 1 10 [x]                                        []                                        []                                        []                                           Quad Sets 1 10 [x]                                        []                                        []                                        []                                           Hamstring Sets   []                                        []                                        []                                        []                                           Short Arc Quads   []                                        []                                        []                                        []                                           Knee Extension Stretch     []                                          []                                          []                                          []                                           Heel Slides 1 10 [x]                                        []                                        []                                        []                                           Long Arc Quads   []                                        []                                        []                                        []                                           Knee Flexion Stretch   []                                        []                                        []                                        []                                           Straight Leg Raises   []                                        []                                        [] []                                               Pain Ratin/10 with activity    Activity Tolerance:   Good    After treatment patient left in no apparent distress:   Call bell within reach, Caregiver / family present and sitting EOB. COMMUNICATION/COLLABORATION:   The patients plan of care was discussed with: Registered nurse.      Clark Gimenez PTA   Time Calculation: 34 mins

## 2022-02-11 NOTE — PROGRESS NOTES
Patient is alert and oriented x4, VS WNL, IV access removed and clean dressing applied. Patient is being discharged home with home health. Discharged teaching done with wife at beside. AVS signed by patient.

## 2022-02-11 NOTE — DISCHARGE SUMMARY
Ortho Discharge Summary    Patient ID:  Joyice Mortimer  [id number]  male  78 y.o.  1955    Admit date: 2/10/2022    Discharge date: 2/11/2022    Admitting Physician: Kt Esparza MD     Consulting Physician(s):   Treatment Team: Attending Provider: Sunil Mitchell MD; Utilization Review: Pattie Greenwood; Staff Nurse: Renae Sales, RN; Primary Nurse: Tania Luna RN    Date of Surgery:   2/10/2022     Preoperative Diagnosis:  Primary osteoarthritis of right knee [M17.11]    Postoperative Diagnosis:   Primary osteoarthritis of right knee [M17.11]    Procedure(s):   RIGHT TOTAL KNEE ARTHROPLASTY (SPINAL W/BLOCK W/IV SEDATION)     Anesthesia Type:   Spinal     Surgeon: Sunil Mitchell MD                            HPI:  Pt is a 78 y.o. male who has a history of Primary osteoarthritis of right knee [M17.11]  with pain and limitations of activities of daily living who presents at this time for a right RIGHT TOTAL KNEE ARTHROPLASTY (SPINAL W/BLOCK W/IV SEDATION) following the failure of conservative management. PMH:   Past Medical History:   Diagnosis Date    Arthralgia of knee 7/15/2013    Diabetes ([address]     type 2    Diverticular disease of left colon 3/22/2013    GERD (gastroesophageal reflux disease)     Gout 12/6/1985    Hypertension     SLIGHTLY PER PT'S PCP       Body mass index is 31.24 kg/m². : A BMI > 30 is classified as obesity and > 40 is classified as morbid obesity. Medications upon admission :   Prior to Admission Medications   Prescriptions Last Dose Informant Patient Reported? Taking?   atorvastatin (LIPITOR) 10 mg tablet 2/9/2022 at Unknown time  No Yes   Sig: TAKE 1 TABLET BY MOUTH EVERY DAY   Patient taking differently: nightly. TAKE 1 TABLET BY MOUTH EVERY DAY   ciprofloxacin HCl (CIPRO) 500 mg tablet Unknown at Unknown time  Yes No   Sig: as needed (FOR DIVERTICULITIS FLARE UPS).    Patient not taking: Reported on 1/25/2022   doxycycline (VIBRAMYCIN) 100 mg capsule 2/9/2022 at Unknown time  No Yes   Sig: TAKE 1 CAPSULE BY MOUTH EVERY DAY   Patient taking differently: nightly. ibuprofen (MOTRIN) 200 mg tablet 2/7/2022  Yes No   Sig: Take 800 mg by mouth as needed. Patient not taking: Reported on 1/25/2022   lisinopril-hydroCHLOROthiazide (PRINZIDE, ZESTORETIC) 10-12.5 mg per tablet 2/9/2022 at Unknown time  No Yes   Sig: Take 1 Tablet by mouth daily. Patient taking differently: Take 1 Tablet by mouth nightly. metFORMIN ER (GLUCOPHAGE XR) 500 mg tablet 2/9/2022 at 1900  No Yes   Sig: TAKE 3 TABLETS BY MOUTH EVERY DAY WITH DINNER   sildenafil citrate (Viagra) 100 mg tablet Unknown at Unknown time  No No   Sig: Take 1 Tab by mouth as needed for Erectile Dysfunction. Patient not taking: Reported on 1/25/2022      Facility-Administered Medications: None        Allergies:  No Known Allergies     Hospital Course: The patient underwent surgery. Complications:  None; patient tolerated the procedure well. Was taken to the PACU in stable condition and then transferred to the ortho floor. Mobilizing well on POD 1 with PT clearance. Perioperative Antibiotics:  Ancef     Postoperative Pain Management:  Oxycodone & Tylenol with scheduled toradol while in patient    DVT Prophylaxis: Aspirin 81 mg PO BID    Postoperative transfusions:    Number of units banked PRBCs =   none     Post Op complications: none    Hemoglobin at discharge:    Lab Results   Component Value Date/Time    HGB 10.6 (L) 02/11/2022 12:10 AM    INR 1.0 01/13/2022 08:59 AM       Aqaucel dressing remained in place - clean, dry and intact. No significant erythema or swelling. Wound appears to be healing without any evidence of infection. Neurovascular exam found to be within normal limits. Physical Therapy started following surgery and participated in bed mobility, transfers and ambulation.         Gait:  Gait  Base of Support: Widened  Speed/Erin: Slow  Step Length: Left shortened,Right shortened  Distance (ft): 40 Feet (ft)  Assistive Device: Gait belt,Walker, rolling                   Discharged to: Home. Condition on Discharge:   good    Discharge instructions:  - Anticoagulate with Aspirin 81 mg PO BID  - Take pain medications as prescribed  - Resume pre hospital diet      - Discharge activity: activity as tolerated  - Ambulate with assistive device as needed. - Weight bearing status as tolerated  - Wound Care Keep wound clean and dry. See discharge instruction sheet. -DISCHARGE MEDICATION LIST     Current Discharge Medication List      START taking these medications    Details   oxyCODONE IR (ROXICODONE) 5 mg immediate release tablet Take 0.5-1 Tablets by mouth every four (4) hours as needed for Pain for up to 7 days. Max Daily Amount: 30 mg.  Qty: 42 Tablet, Refills: 0  Start date: 2/10/2022, End date: 2/17/2022    Associated Diagnoses: Primary localized osteoarthritis of right knee      aspirin delayed-release 81 mg tablet Take 1 Tablet by mouth two (2) times a day. Indications: blood clot prevention  Qty: 60 Tablet, Refills: 0  Start date: 2/10/2022      senna-docusate (PERICOLACE) 8.6-50 mg per tablet Take 1 Tablet by mouth two (2) times daily as needed for Constipation. Qty: 60 Tablet, Refills: 0  Start date: 2/10/2022      acetaminophen (TYLENOL) 500 mg tablet Take 1 Tablet by mouth every four (4) hours. Qty: 100 Tablet, Refills: 0  Start date: 2/10/2022         CONTINUE these medications which have NOT CHANGED    Details   lisinopril-hydroCHLOROthiazide (PRINZIDE, ZESTORETIC) 10-12.5 mg per tablet Take 1 Tablet by mouth daily. Qty: 90 Tablet, Refills: 1    Associated Diagnoses: Essential hypertension      doxycycline (VIBRAMYCIN) 100 mg capsule TAKE 1 CAPSULE BY MOUTH EVERY DAY  Qty: 90 Capsule, Refills: 1    Associated Diagnoses: Acne rosacea;  Rosacea      metFORMIN ER (GLUCOPHAGE XR) 500 mg tablet TAKE 3 TABLETS BY MOUTH EVERY DAY WITH DINNER  Qty: 270 Tablet, Refills: 0      atorvastatin (LIPITOR) 10 mg tablet TAKE 1 TABLET BY MOUTH EVERY DAY  Qty: 90 Tab, Refills: 1      ciprofloxacin HCl (CIPRO) 500 mg tablet as needed (FOR DIVERTICULITIS FLARE UPS). sildenafil citrate (Viagra) 100 mg tablet Take 1 Tab by mouth as needed for Erectile Dysfunction.   Qty: 10 Tab, Refills: 2         STOP taking these medications       ibuprofen (MOTRIN) 200 mg tablet Comments:   Reason for Stopping:            per medical continuation form      -Follow up in office in 3-4 weeks      Signed:  Izzy Zee NP  Orthopaedic Nurse Practitioner    2/11/2022  9:36 AM

## 2022-02-11 NOTE — OP NOTES
[address]   OPERATIVE REPORT    Name:  Contreras Todd  MR#:  [id number]  :  1955  ACCOUNT #:  [de-identified]  DATE OF SERVICE:  02/10/2022    PREOPERATIVE DIAGNOSIS:  Osteoarthritis, right knee. POSTOPERATIVE DIAGNOSIS:  Osteoarthritis, right knee. PROCEDURE PERFORMED:  Right total knee arthroplasty. SURGEON:  Tom Franks MD    ASSISTANT:  Carmen Mckeon PA-C    ANESTHESIA:  Spinal with sedation as well as adductor canal block. COMPLICATIONS:  None. SPECIMENS REMOVED:  None. IMPLANTS:  Components implanted, DonJoy Empowr 3-D size 10 femur, size 10 tibial tray with 10-mm polyethylene insert and 32-mm patella. ESTIMATED BLOOD LOSS:  200 mL. INDICATIONS:  The patient is a 70-year-old gentleman with progressive right knee pain due to severe osteoarthritis. Symptoms have progressed despite comprehensive conservative treatment. He presents for right total knee replacement. Risks, benefits, and alternatives of procedure were reviewed with him in detail and he desires to proceed. PROCEDURE:  The Anesthesia team placed an adductor canal block in the right thigh before taking the patient to the operating room and they also placed a spinal.  Preoperative IV antibiotics were administered. Padded pneumatic tourniquet was placed around right upper thigh. Right lower extremity was prepped and draped in the usual sterile fashion. Tourniquet was inflated to 275. Through a midline anterior knee incision, I performed a medial parapatellar arthrotomy. Progressive medial release was performed to facilitate exposure and soft tissue balance throughout the procedure. As soon as the knee was flexed, the tourniquet was released. 10-mm distal femoral resection was performed using OrthAlign to navigate neutral cut relative to mechanical axis of the femur. PCL was excised. Proximal tibial resection was performed using an extramedullary alignment guide.   Menisci were excised and at this point, there was a symmetrical extension gap with a 10-mm spacer block with no need for further releases. Knee was placed in 90 degrees of flexion and gap  was inserted. Soft tissue tension was applied and block was adjusted to produce 10-mm flexion space. Femoral rotation was drilled and the femur was sized and prepared for a size 10 component utilizing appropriate jig. Remaining posterior osteophytes removed from the femur and subperiosteal posterior capsular release was performed. Trials were inserted and with the 10-mm insert in place, the knee had full extension to gravity. Satisfactory coronal plane stability and soft tissue tension throughout arc of motion. The patella was prepared for 32-mm component and tracked centrally using no thumbs technique. Tourniquet was reinflated. Trials were removed and tibial preparation was completed. Bony surfaces were copiously irrigated by pulse lavage and dried before the real components were cemented into place using antibiotic impregnated cement. Excess cement was removed and the knee was reduced in full extension with the real insert in place during cement setup. Periarticular soft tissues were injected with solution containing 0.5% ropivacaine with epinephrine as well as clonidine and Toradol. Tourniquet was released. Wound was irrigated. Arthrotomy was closed with a combination of heavy interrupted Vicryl sutures and a running #2 Stratafix suture. Skin and subcutaneous layers were closed in layered fashion with Vicryl and a running Monocryl subcuticular stitch. Wound was dressed with Dermabond and Aquacel occlusive dressing as well as sterile compressive dressing. The patient's bladder was decompressed with straight catheterization before he was transported to the postanesthesia care unit in stable condition. All counts were correct at the end of the procedure.     The physician assistant was critical throughout the procedure to assist with the patient's positioning, retraction, and closure.   There were no [employer] residents or fellows available to Ronak Harkins MD      [id number]  D:  02/11/2022 0:01  T:  02/11/2022 2:30  JOB #:  [id number]  CC:  MD Terry Caba MD

## 2022-02-11 NOTE — PROGRESS NOTES
Ortho NP Note    POD# 1  s/p RIGHT TOTAL KNEE ARTHROPLASTY (SPINAL W/BLOCK W/IV SEDATION)   Pt seen standing in room with Dr. Everardo Boo    Pt ambulating in room, states knee is comfortable at this time. No N/V, CP, SOB.        VSS Afebrile. Visit Vitals  /74   Pulse 78   Temp 97.9 °F (36.6 °C)   Resp 16   Ht 5' 11\" (1.803 m)   Wt 101.6 kg (224 lb)   SpO2 94%   BMI 31.24 kg/m²       Voiding status: spontaneous  Output (mL)  Urine Voided: 400 ml (02/10/22 1615)  Last Bowel Movement Date:  (PTA) (02/10/22 2019)  Unmeasurable Output  Urine Occurrence(s): 1 (02/10/22 2019)  Straight Cath  Straight Cath: Nurse performed cath;Sterile technique used (urine returned) (02/10/22 0942)  Number of Attempts: 1 (02/10/22 0942)  Catheter Size: 16 FR (02/10/22 0942)  Time Catheter Inserted: 2607 (02/10/22 9019)  Time Catheter Removed: 0278 (02/10/22 0942)      Labs    Lab Results   Component Value Date/Time    HGB 10.6 (L) 02/11/2022 12:10 AM      Lab Results   Component Value Date/Time    INR 1.0 01/13/2022 08:59 AM      Lab Results   Component Value Date/Time    Sodium 139 02/11/2022 12:10 AM    Potassium 4.2 02/11/2022 12:10 AM    Chloride 107 02/11/2022 12:10 AM    CO2 27 02/11/2022 12:10 AM    Glucose 155 (H) 02/11/2022 12:10 AM    BUN 21 (H) 02/11/2022 12:10 AM    Creatinine 1.12 02/11/2022 12:10 AM    Calcium 8.6 02/11/2022 12:10 AM     Recent Glucose Results:   Lab Results   Component Value Date/Time     (H) 02/11/2022 12:10 AM    GLUCPOC 140 (H) 02/11/2022 06:26 AM    GLUCPOC 143 (H) 02/10/2022 04:46 PM    GLUCPOC 185 (H) 02/10/2022 12:02 PM           Body mass index is 31.24 kg/m². : A BMI > 30 is classified as obesity and > 40 is classified as morbid obesity. Ace wrap and gauze to R LEc.d.i  Cryotherapy in place over incision  Calves soft and supple; No pain with passive stretch  Bilateral LEs warm, dry. 2+ DP pulses.     Sensation and motor intact - PF/DF/EHL intact 5/5  SCDs for mechanical DVT proph while in bed     PLAN:  1) PT: BID WBAT  2) Anticoagulation:  Aspirin 81 mg PO BID for DVT Prophylaxis. Encouraged early mobilization, bed exercises, and SCD use. 3) Pain - Multimodal approach including cryotherapy, scheduled Tylenol & Toradol with  PRN oxycodone & IV dilaudid   4) Hx of HTN: Current /92, HR: 93.  Resume home Prinzide (lisinopril/hctz)    5) Hx of glucose intolerance: Pre op HGBA1C: 6.2. AM BS: 140  Using metformin at home. Discussed importance of glycemic control. Plan for sliding scale lispro ACHS.   6) Readniess for discharge:     [x] Vital Signs stable    [x] Hgb stable - 10.6, expected in post op setting   [x] + Voiding    [x] Wound intact, drainage minimal    [x] Tolerating PO intake     [] Cleared by PT (OT if applicable)     [] Stair training completed (if applicable)    [] Independent / Contact Guard Assist (household distance)     [] Bed mobility     [] Car transfers     [] ADLs    [x] Adequate pain control on oral medication alone     Likely discharge to home today with Iris Mann NP  Available via [employer]

## 2022-02-14 NOTE — NURSE NAVIGATOR
[facility]  [facility] Orthopaedic Pathway Handoff     FROM:                                TO: [address]                                                      ([facility] or Facility name)  Marita Hull 54  [address] [place] [address]  Dept: [address]: [phone]                                      Room#:  551/01                                                       Nurse Navigator:  Kane Galeano RN         SITUATION      ASAScore: ASA 2 - Patient with mild systemic disease with no functional limitations    Admitted:  2/10/2022  Hospital Day: 2      Attending Provider:  No att. providers found     Consultations:  None    PCP:  Rigo Vargas MD   [phone]     Admitting Dx:  Primary osteoarthritis of right knee [M17.11]  Primary localized osteoarthritis of right knee [M17.11]       Active Problems:    Primary localized osteoarthritis of right knee (1/25/2022)      4 Days Post-Op of   Procedure(s):  RIGHT TOTAL KNEE ARTHROPLASTY (SPINAL W/BLOCK W/IV SEDATION)   BY: Aj Peters MD             ON: 2/10/2022                  Code Status: Prior             Advance Directive? No Doesnt Have (Send w/patient)     Isolation:  There are currently no Active Isolations       MDRO: No current active infections    BACKGROUND     Allergies:  No Known Allergies    Past Medical History:   Diagnosis Date    Arthralgia of knee 7/15/2013    Diabetes ([address]     type 2    Diverticular disease of left colon 3/22/2013    GERD (gastroesophageal reflux disease)     Gout 12/6/1985    Hypertension     SLIGHTLY PER PT'S PCP       Past Surgical History:   Procedure Laterality Date    HX COLONOSCOPY  2000    Dr. Love Trinidad    HX HEENT      deviated septum age 12     [facility] [address]     HX OTHER SURGICAL  1/21/16    excision of lymph node right axilla    HX TONSILLECTOMY      child Prior to Admission Medications   Prescriptions Last Dose Informant Patient Reported? Taking?   atorvastatin (LIPITOR) 10 mg tablet 2/9/2022 at Unknown time  No Yes   Sig: TAKE 1 TABLET BY MOUTH EVERY DAY   Patient taking differently: nightly. TAKE 1 TABLET BY MOUTH EVERY DAY   ciprofloxacin HCl (CIPRO) 500 mg tablet Unknown at Unknown time  Yes No   Sig: as needed (FOR DIVERTICULITIS FLARE UPS). Patient not taking: Reported on 1/25/2022   doxycycline (VIBRAMYCIN) 100 mg capsule 2/9/2022 at Unknown time  No Yes   Sig: TAKE 1 CAPSULE BY MOUTH EVERY DAY   Patient taking differently: nightly. ibuprofen (MOTRIN) 200 mg tablet 2/7/2022  Yes No   Sig: Take 800 mg by mouth as needed. Patient not taking: Reported on 1/25/2022   lisinopril-hydroCHLOROthiazide (PRINZIDE, ZESTORETIC) 10-12.5 mg per tablet 2/9/2022 at Unknown time  No Yes   Sig: Take 1 Tablet by mouth daily. Patient taking differently: Take 1 Tablet by mouth nightly. metFORMIN ER (GLUCOPHAGE XR) 500 mg tablet 2/9/2022 at 1900  No Yes   Sig: TAKE 3 TABLETS BY MOUTH EVERY DAY WITH DINNER   sildenafil citrate (Viagra) 100 mg tablet Unknown at Unknown time  No No   Sig: Take 1 Tab by mouth as needed for Erectile Dysfunction. Patient not taking: Reported on 1/25/2022      Facility-Administered Medications: None       Vaccinations:    Immunization History   Administered Date(s) Administered    COVID-19, J&J, PF, 0.5 mL Dose 03/01/2021, 03/21/2021    COVID-19, Moderna, Primary or Immunocompromised Series, MRNA, PF, 100mcg/0.5mL 10/25/2021    Influenza Vaccine 11/18/2013    Influenza Vaccine (Quad) PF (>6 Mo Flulaval, Fluarix, and >3 Yrs Afluria, Fluzone 20637) 12/02/2014, 12/02/2015, 02/02/2018, 02/13/2019    Influenza, Quadrivalent, Adjuvanted (>65 Yrs FLUAD QUAD 56313) 10/21/2021    Pneumococcal Polysaccharide (PPSV-23) 07/15/2013    Tdap 03/22/2013    Zoster Vaccine, Live 06/02/2015         ASSESSMENT   Age: 78 y.o.              Gender: male Height: Height: 5' 11\" (180.3 cm)                    Weight:Weight: 101.6 kg (224 lb)     No data found. Active Orders   There are no active orders of the following types: Diet. Orientation: Orientation Level: Oriented X4    Active Lines/Drains:  (Peg Tube / Montero / CL or S/L?):no    Urinary Status: Voiding      Last BM: Last Bowel Movement Date:  (PTA)     Skin Integrity: Incision (comment)             Mobility: Slightly limited   Weight Bearing Status: WBAT (Weight Bearing as Tolerated)      Gait Training  Assistive Device: Gait belt,Walker, rolling  Ambulation - Level of Assistance: Stand-by assistance  Distance (ft): 300 Feet (ft)  Stairs - Level of Assistance: Contact guard assistance  Number of Stairs Trained: 8  Rail Use: None (SPC)     On Anticoagulation? YES  Aspirin                                         Pain Medications given:  Oxycodone                                   Lab Results   Component Value Date/Time    Glucose 155 (H) 02/11/2022 12:10 AM    Hemoglobin A1c 6.2 (H) 01/13/2022 08:59 AM    INR 1.0 01/13/2022 08:59 AM    HGB 10.6 (L) 02/11/2022 12:10 AM    HGB 12.8 01/13/2022 08:59 AM    HGB 15.3 11/08/2021 01:11 PM    HGB 14.3 04/21/2021 10:51 AM       Readmission Risks:  Score:         RECOMMENDATION     See After Visit Summary (AVS) for:  · Discharge instructions  · After [address]   · Medication Reconciliation          [facility] Orthopaedic Nurse Navigator  JAMAL Carpio, RN-BC       Office  [phone]  Cell      [phone]  Fax      [phone]  [email or web address]             . [place]

## 2022-02-15 ENCOUNTER — PATIENT OUTREACH (OUTPATIENT)
Dept: CASE MANAGEMENT | Age: 67
End: 2022-02-15

## 2022-02-15 NOTE — PROGRESS NOTES
Post Discharge Follow-up contact after Joint Replacement    Patient discharged on 2/11/22  By  Mary Coelho   following  right knee Arthroplasty. Spoke with patient today, who reports that \" everything is progressing as anticipated. \"  Denies Fever, Shortness of Breath or Chest Pain. Home Health has visited. Patient also reports:  Surgical dressing clean, dry, intact  Calf is non-tender, operative extremity has minimal swelling. Pain is well managed. Discussed use of ice & elevation. Patient is progressing with therapy and is exercising independently. Taking Aspirin for anticoagulation, Tylenol for pain. Patient is not experiencing symptoms of constipation & urinating without difficulty. Discussed side effects of anticoagulants & pain medications (bleeding/bruising, constipation, lightheaded/dizziness)  Follow up appointment is scheduled for 3/4/22. Discussed calling surgeon Dr Inga Redmond  for drainage, bleeding, swelling in operative extremity, fever or pain. Discussed calling PCP Dr Luis Suresh with other medical issues.

## 2022-02-16 DIAGNOSIS — Z96.651 STATUS POST RIGHT KNEE REPLACEMENT: Primary | ICD-10-CM

## 2022-02-16 DIAGNOSIS — M79.661 RIGHT CALF PAIN: ICD-10-CM

## 2022-02-17 ENCOUNTER — HOSPITAL ENCOUNTER (OUTPATIENT)
Dept: VASCULAR SURGERY | Age: 67
Discharge: HOME | End: 2022-02-17
Payer: MEDICARE

## 2022-02-17 DIAGNOSIS — M79.661 RIGHT CALF PAIN: ICD-10-CM

## 2022-02-17 DIAGNOSIS — Z96.651 STATUS POST RIGHT KNEE REPLACEMENT: ICD-10-CM

## 2022-02-17 PROCEDURE — 93971 EXTREMITY STUDY: CPT

## 2022-03-04 ENCOUNTER — OFFICE VISIT (OUTPATIENT)
Dept: ORTHOPEDIC SURGERY | Age: 67
End: 2022-03-04
Payer: MEDICARE

## 2022-03-04 VITALS — WEIGHT: 211 LBS | BODY MASS INDEX: 29.54 KG/M2 | HEIGHT: 71 IN

## 2022-03-04 DIAGNOSIS — Z09 SURGERY FOLLOW-UP: ICD-10-CM

## 2022-03-04 DIAGNOSIS — M54.31 SCIATICA OF RIGHT SIDE: ICD-10-CM

## 2022-03-04 DIAGNOSIS — Z96.651 STATUS POST RIGHT KNEE REPLACEMENT: Primary | ICD-10-CM

## 2022-03-04 PROCEDURE — 99024 POSTOP FOLLOW-UP VISIT: CPT | Performed by: PHYSICIAN ASSISTANT

## 2022-03-04 RX ORDER — METHYLPREDNISOLONE 4 MG/1
TABLET ORAL
Qty: 1 DOSE PACK | Refills: 0 | Status: SHIPPED | OUTPATIENT
Start: 2022-03-04

## 2022-03-04 NOTE — PROGRESS NOTES
Chiquis Bullard (: 1955) is a 78 y.o. male, patient, here for evaluation of the following chief complaint(s):  Surgical Follow-up (RTK #1 F/U)       SUBJECTIVE/OBJECTIVE:  Chiquis Bullard presents today for first postop visit status post right TKA 2/10/22. Finished with home health physical therapy, ready to transition outpatient. Taking Tylenol as needed for pain. Still taking aspirin twice a day for DVT prophylaxis as prescribed. PHYSICAL EXAM:  Vitals: Ht 5' 11\" (1.803 m)   Wt 211 lb (95.7 kg)   BMI 29.43 kg/m²   Body mass index is 29.43 kg/m². 78y.o. year old M in no acute distress. Ambulates with a slight limp on the right, cane in the contralateral hand. Neutral alignment of the right knee. Well-healing curvilinear incision with no warmth, erythema, drainage. Mild to moderate residual effusion. Range of motion lacks a few degrees of full extension with flexion to 90. Preop 51 15. Motor 5/5. No distal edema. IMAGING:  Radiographs: XR Results (most recent):  Results from Appointment encounter on 22    XR KNEE RT 3 V    Narrative  3 views of the right knee today including AP, lateral and sunrise using digital radiography demonstrate satisfactory position and alignment of cemented cruciate substituting components. No evidence of loosening. Patella tracks centrally. ASSESSMENT/PLAN:  1. Status post right knee replacement  -     REFERRAL TO PHYSICAL THERAPY  -     XR KNEE RT 3 V; Future  2. Surgery follow-up  3. Sciatica of right side  -     methylPREDNISolone (MEDROL DOSEPACK) 4 mg tablet; Per dose pack instructions, Normal, Disp-1 Dose Pack, R-0    First postop visit status post right total knee arthroplasty on 2/10/2022. Continue Tylenol as needed. Discussed range of motion goals. Transition outpatient physical therapy, prescription provided. Resume daily aspirin dosing. Follow-up in 4 weeks for clinical recheck, sooner if needed.    Return in about 4 weeks (around 4/1/2022) for POV #2 with Dr. Carolina Duke. Review Of Systems  ROS     Positive for: Skin, Musculoskeletal    Last edited by Gena Mendieta RN on 3/4/2022  8:41 AM. (History)         Patient denies any recent fever, chills, nausea, vomiting, chest pain, or shortness of breath. No Known Allergies    Current Outpatient Medications   Medication Sig    methylPREDNISolone (MEDROL DOSEPACK) 4 mg tablet Per dose pack instructions    aspirin delayed-release 81 mg tablet Take 1 Tablet by mouth two (2) times a day. Indications: blood clot prevention    senna-docusate (PERICOLACE) 8.6-50 mg per tablet Take 1 Tablet by mouth two (2) times daily as needed for Constipation.  acetaminophen (TYLENOL) 500 mg tablet Take 1 Tablet by mouth every four (4) hours.  ciprofloxacin HCl (CIPRO) 500 mg tablet as needed (FOR DIVERTICULITIS FLARE UPS). (Patient not taking: Reported on 1/25/2022)    lisinopril-hydroCHLOROthiazide (PRINZIDE, ZESTORETIC) 10-12.5 mg per tablet Take 1 Tablet by mouth daily. (Patient taking differently: Take 1 Tablet by mouth nightly.)    doxycycline (VIBRAMYCIN) 100 mg capsule TAKE 1 CAPSULE BY MOUTH EVERY DAY (Patient taking differently: nightly.)    metFORMIN ER (GLUCOPHAGE XR) 500 mg tablet TAKE 3 TABLETS BY MOUTH EVERY DAY WITH DINNER    sildenafil citrate (Viagra) 100 mg tablet Take 1 Tab by mouth as needed for Erectile Dysfunction. (Patient not taking: Reported on 1/25/2022)    atorvastatin (LIPITOR) 10 mg tablet TAKE 1 TABLET BY MOUTH EVERY DAY (Patient taking differently: nightly. TAKE 1 TABLET BY MOUTH EVERY DAY)     No current facility-administered medications for this visit.        Past Medical History:   Diagnosis Date    Arthralgia of knee 7/15/2013    Diabetes ([facility] [address]     type 2    Diverticular disease of left colon 3/22/2013    GERD (gastroesophageal reflux disease)     Gout 12/6/1985    Hypertension     SLIGHTLY PER PT'S PCP        Past Surgical History:   Procedure Laterality Date  HX COLONOSCOPY  2000    Dr. Mana Trinidad    HX HEENT      deviated septum age 12     [facility] [address]  HX OTHER SURGICAL  1/21/16    excision of lymph node right axilla    HX TONSILLECTOMY      child       Family History   Problem Relation Age of Onset    Mental Retardation Mother     Cancer Father         lung    Diabetes Maternal Grandmother     Diabetes Paternal Grandmother         Social History     Socioeconomic History    Marital status:      Spouse name: Not on file    Number of children: Not on file    Years of education: Not on file    Highest education level: Not on file   Occupational History    Not on file   Tobacco Use    Smoking status: Never Smoker    Smokeless tobacco: Never Used   Vaping Use    Vaping Use: Never used   Substance and Sexual Activity    Alcohol use: Yes     Comment: OCCASSIONELY    Drug use: No    Sexual activity: Yes     Partners: Female   Other Topics Concern    Not on file   Social History Narrative    Not on file     Social Determinants of Health     Financial Resource Strain:     Difficulty of Paying Living Expenses: Not on file   Food Insecurity:     Worried About Running Out of Food in the Last Year: Not on file    Roxanna of Food in the Last Year: Not on file   Transportation Needs:     Lack of Transportation (Medical): Not on file    Lack of Transportation (Non-Medical):  Not on file   Physical Activity:     Days of Exercise per Week: Not on file    Minutes of Exercise per Session: Not on file   Stress:     Feeling of Stress : Not on file   Social Connections:     Frequency of Communication with Friends and Family: Not on file    Frequency of Social Gatherings with Friends and Family: Not on file    Attends Zoroastrianism Services: Not on file    Active Member of Clubs or Organizations: Not on file    Attends Club or Organization Meetings: Not on file    Marital Status: Not on file Intimate Partner Violence:     Fear of Current or Ex-Partner: Not on file    Emotionally Abused: Not on file    Physically Abused: Not on file    Sexually Abused: Not on file   Housing Stability:     Unable to Pay for Housing in the Last Year: Not on file    Number of [place] in the Last Year: Not on file    Unstable Housing in the Last Year: Not on file         Orders Placed This Encounter    XR KNEE RT 3 V     Standing Status:   Future     Number of Occurrences:   1     Standing Expiration Date:   3/5/2023    REFERRAL TO PHYSICAL THERAPY     Referral Priority:   Routine     Referral Type:   PT/OT/ST     Referral Reason:   Specialty Services Required     Number of Visits Requested:   1    methylPREDNISolone (MEDROL DOSEPACK) 4 mg tablet     Sig: Per dose pack instructions     Dispense:  1 Dose Pack     Refill:  0        Surjit Garduno M.D. was available for immediate consultation as the supervising physician. An electronic signature was used to authenticate this note.   -- Kev Farrell PA-C

## 2022-03-04 NOTE — LETTER
3/4/2022    Patient: Analia Lopez   YOB: 1955   Date of Visit: 3/4/2022     Karma Phillip, [address] [facility]  Via In Basket    Dear Karma Phillip MD,      Thank you for referring Mr. Deb Martin to [facility] for evaluation. My notes for this consultation are attached. If you have questions, please do not hesitate to call me. I look forward to following your patient along with you.       Sincerely,    Kathleen Saha PA-C

## 2022-03-09 ENCOUNTER — HOSPITAL ENCOUNTER (OUTPATIENT)
Dept: PHYSICAL THERAPY | Age: 67
Discharge: HOME | End: 2022-03-09
Payer: MEDICARE

## 2022-03-09 PROCEDURE — 97161 PT EVAL LOW COMPLEX 20 MIN: CPT

## 2022-03-09 PROCEDURE — 97110 THERAPEUTIC EXERCISES: CPT

## 2022-03-09 NOTE — PROGRESS NOTES
PT INITIAL EVALUATION NOTE - [facility] 2-15    Patient Name: Bianka Costa  Date:3/9/2022  : 1955  [x]  Patient  Verified  Payor: [place] MEDICARE / Plan: [place] MEDICARE PART A & B / Product Type: Medicare /    In time:10:15 AM  Out time:11:00 AM  Total Treatment Time (min): 45  Total Timed Codes (min):25  1:1 Treatment Time ([address] only): 25   Visit #: 1    Treatment Area: Pain in right knee [M25.561]  Unilateral primary osteoarthritis, right knee [M17.11]    SUBJECTIVE  Pain Level (0-10 scale): 1/10  Any medication changes, allergies to medications, adverse drug reactions, diagnosis change, or new procedure performed?: [] No    [x] Yes (see summary sheet for update)    Subjective:    78year old white male who had a right knee replacement performed in February 10, 2022 at [id number] [place] [address] Road was received for 2 weeks. Patient returned to MD on 2022 and referred to Outpatient Physical Therapy to improve ROM. Gait with straight cane as needed. Plan to get left knee done in . PLOF: Independent with all ADL's; no use of AD  Mechanism of Injury: Injury to knee 45 years ago, however presently due to OA.   Previous Treatment/Compliance: Home Health Physical Therapy for 2 weeks  Radiographs: Yes  What increases symptoms: Bending knee  What decreases symptoms: ICE and medication  Work Hx: Working  Living Situation: Staying at home with wife, 3 steps  Pt Goals: to increase range of motion  Barriers: none  Motivation: Good  Substance use: None reported  Cognition: A&O x 4  Fall Assessment: TUG Test: 12 seconds  Past Medical History:  Past Medical History:   Diagnosis Date    Arthralgia of knee 7/15/2013    Diabetes ([facility] [address]     type 2    Diverticular disease of left colon 3/22/2013    GERD (gastroesophageal reflux disease)     Gout 1985    Hypertension     SLIGHTLY PER PT'S PCP     Past Surgical History:  Past Surgical History:   Procedure Laterality Date    HX COLONOSCOPY   Dr. Olmos Ameena- [place]    HX HEENT      deviated septum age 12     [address]  HX OTHER SURGICAL  1/21/16    excision of lymph node right axilla    HX TONSILLECTOMY      child     Current Medications:  Current Outpatient Medications   Medication Instructions    acetaminophen (TYLENOL) 500 mg, Oral, EVERY 4 HOURS    aspirin delayed-release 81 mg, Oral, 2 TIMES DAILY    atorvastatin (LIPITOR) 10 mg tablet TAKE 1 TABLET BY MOUTH EVERY DAY    ciprofloxacin HCl (CIPRO) 500 mg tablet AS NEEDED    doxycycline (VIBRAMYCIN) 100 mg capsule TAKE 1 CAPSULE BY MOUTH EVERY DAY    lisinopril-hydroCHLOROthiazide (PRINZIDE, ZESTORETIC) 10-12.5 mg per tablet 1 Tablet, Oral, DAILY    metFORMIN ER (GLUCOPHAGE XR) 500 mg tablet TAKE 3 TABLETS BY MOUTH EVERY DAY WITH DINNER    methylPREDNISolone (MEDROL DOSEPACK) 4 mg tablet Per dose pack instructions    senna-docusate (PERICOLACE) 8.6-50 mg per tablet 1 Tablet, Oral, 2 TIMES DAILY AS NEEDED    sildenafil citrate (VIAGRA) 100 mg, Oral, AS NEEDED          OBJECTIVE/EXAMINATION    25 min Therapeutic Exercise:  [x] See flow sheet :   Rationale: increase ROM, increase strength, improve coordination, improve balance and increase proprioception to improve the   patients ability to return to normal gait. With   [] TE   [] TA   [] neuro   [] other: Patient Education: [x] Provided HEP    [] Progressed/Changed HEP based on:   [] positioning   [] body mechanics   [] transfers   [] heat/ice application    [] other:    Posture: Good  Other Observations: Right Hamstring tightness and extension lag  Gait and Functional Mobility: Gait with straight cane independently  Palpation: right knee with healing incision site.       Hip:  Strength      Right Left       Flexion 5/5 5/5       Extension 5/5 5/5       Abduction 5/5 5/5       Adduction 5/5 5/5      Knee:  Strength AROM/ PROM     Right Left Right Right     Flexion 4/5 5/5 103 105 Extension 4/5 4/5 -15 -10    Ankle  Strength      Right Left       Dorsiflexion 5/5 5/5       Plantarflexion 5/5 5/5       Inversion 5/5 5/5       Eversion 5/5 5/5      *All strength measures are on a scale with 5 as a maximum, if a space is left blank it was not tested. All ROM measurements are measured in degrees.       Pain Level (0-10 scale) post treatment: 1/10    ASSESSMENT/Changes in Function:   [x]  See Plan of [address] [facility], ,  3/9/2022

## 2022-03-09 NOTE — PROGRESS NOTES
[address]  [place], [address] [place]  Ph: [phone]    Fax: [phone]    Plan of Care/Statement of Necessity for Physical Therapy Services  2-15    Patient name: Nkechi Melgar  : 1955  Provider#: [id number]  Referral source: Van Brady MD      Medical/Treatment Diagnosis: Pain in right knee [M25.561]  Unilateral primary osteoarthritis, right knee [M17.11]     Prior Hospitalization: see medical history     Comorbidities: see medical history  Prior Level of Function: Independent with all ADL's; no use of AD  Medications: Verified on Patient Summary List  Start of Care: 3/9/2022      Onset Date: 2/10/22   The Plan of Care and following information is based on the information from the initial evaluation. Assessment/ key information:   78year old white male with right post-op knee surgery of 4 weeks. Patient received Home Health Physical Therapy for 2 weeks and was referred to Outpatient Physical Therapy following MD visit on 2022. Patient is ambulating independently with a straight cane. Patient presents decreased range of motion and strength of the right knee that is interfering with returning to normal gait with no assistive device and activities of daily living. He will benefit from skilled Physical Therapy to return to prior level of function. Physical Therapy interventions will include progressive gait training, range of motion exercises to increase full extension and flexion. Modalities will be provided as needed for swelling and pain. Patient will be provided with  Home exercise with 1:1 supervision for compliance to return to maximal level of function.      Evaluation Complexity History LOW Complexity : Zero comorbidities / personal factors that will impact the outcome / POC; Examination LOW Complexity : 1-2 Standardized tests and measures addressing body structure, function, activity limitation and / or participation in recreation ;Presentation LOW Complexity : Stable, uncomplicated  ;Clinical Decision Making TUG Score: 12 seconds  Overall Complexity Rating: LOW     Problem List: pain affecting function, decrease ROM, decrease strength, decrease ADL/ functional abilitiies and decrease flexibility/ joint mobility   Treatment Plan may include any combination of the following: Therapeutic exercise, Physical agent/modality, Gait/balance training, Manual therapy, Patient education, Self Care training, Functional mobility training, Home safety training and Stair training  Patient / Family readiness to learn indicated by: asking questions  Persons(s) to be included in education: patient (P)  Barriers to Learning/Limitations: None  Patient Goal (s): To increase knee range of motion  Patient Self Reported Health Status: good  Rehabilitation Potential: good    Short Term Goals: To be accomplished in 6 treatments. Patient will be able to perform HEP independently to obtain full extension and flexion. Patient will be able to perform sit to stand from low chair with flexion of over 115 degrees. Patient will be able to ambulate community distance greater than 10 minutes with no assistive device. Long Term Goals: To be accomplished in 12 treatments. Patient will be able to perform sit to stand from low chair with right knee strength of 5/5 with no difficulty. Patient will be able to return to normal gait with no assistive for community distances as long as he likes. Patient will be able to achieve -5 degrees of extension and 120 degrees of right knee to maintain mobility with no difficulty. Frequency / Duration: Patient to be seen 2 times per week for 12 treatments.     Patient/ Caregiver education and instruction: exercises    [x]  Plan of care has been reviewed with PTA        Certification Period: 3/9/2022 to June 7, 2022    Jeffrey Brown, PT,  3/9/2022     ________________________________________________________________________    I certify that the above Therapy Services are being furnished while the patient is under my care. I agree with the treatment plan and certify that this therapy is necessary.     Physician's Signature:____________________  Date:____________Time: _________    Patient name: Erik Abad  : 1955  Provider#: [id number]

## 2022-03-11 ENCOUNTER — HOSPITAL ENCOUNTER (OUTPATIENT)
Dept: PHYSICAL THERAPY | Age: 67
Discharge: HOME | End: 2022-03-11
Payer: MEDICARE

## 2022-03-11 PROCEDURE — 97016 VASOPNEUMATIC DEVICE THERAPY: CPT

## 2022-03-11 PROCEDURE — 97110 THERAPEUTIC EXERCISES: CPT

## 2022-03-11 NOTE — PROGRESS NOTES
PT DAILY TREATMENT NOTE - [facility] 2-15    Patient Name: Anne Marie Kwon  Date:3/11/2022  : 1955  [x]  Patient  Verified  Payor: VA MEDICARE / Plan: VA MEDICARE PART A & B / Product Type: Medicare /    In time:8:30 AM  Out time:9:30 AM  Total Treatment Time (min):60  Total Timed Codes (min):50  1:1 Treatment Time ( [address] only): 50  Visit #:  2    Treatment Area: Pain in right knee [M25.561]  Unilateral primary osteoarthritis, right knee [M17.11]    SUBJECTIVE  Pain Level (0-10 scale): 2/10  Any medication changes, allergies to medications, adverse drug reactions, diagnosis change, or new procedure performed?: [x] No    [] Yes (see summary sheet for update)    Subjective functional status/changes:   [] No changes reported  Don't trust my knee yet. I cannot sleep in my because my back bothers met. I feel numbness to my foot. I thought it maybe the spinal block. OBJECTIVE    Modality rationale: Shwetha Rosario Type Additional Details       [] Estim: []Att   []Unatt    []TENS instruct                  []IFC  []Premod   []NMES                    []Other:  []w/US      []w/ heat  []w/ ice  Position:  Location:       []  Traction: [] Cervical       []Lumbar                       [] Prone          []Supine                       []Intermittent   []Continuous Lbs:  [] before manual  [] after manual  [] w/ heat  [] Simultaneously performed with w/ Estim    []  Ultrasound: []Continuous   [] Pulsed                       at: []1MHz   []3MHz Location:  W/cm2:    [] Paraffin         Location:   []w/heat    []  Ice     []  Heat  []  Ice massage Position:  Location:    []  Laser  []  Other: Position:  Location:     10 [x]  Vasopneumatic Device Pressure:       [x] lo [] med [] hi   [x] w/ ice      Temperature:   [] Simultaneously performed with w/ Estim     [x] Skin assessment post-treatment:  [x]intact []redness- no adverse reaction     []redness - adverse reaction:     50 min Therapeutic Exercise:  [x] See flow sheet :   Rationale: increase ROM, increase strength, improve coordination, improve balance and increase proprioception to   improve the patients ability to return to normal gait. With   [] TE   [] TA   [] neuro   [] other: Patient Education: [x] Review HEP  Prone knee extension  [] Progressed/Changed HEP based on:   [] positioning   [] body mechanics   [] transfers   [] heat/ice application    [] other:      Other Objective/Functional Measures: Biodex: PROM 105 degree - promoting full  knee extension  Strengthening exercises with 2lb wt.:  In standing:  Wt. Shifting to improve balance and gait    in Level (0-10 scale) post treatment: 2/10    ASSESSMENT/Changes in Function:   The pt tolerated treatment. Patient is experiencing back pain along with knee pain. Not able to sleep in bed for a long period. Advised patient not to sleep with pillow under knee and concentrate on gait without holding knee so tight. Progress to independent gait with no assistive device. Patient will continue to benefit from skilled PT services to address functional mobility deficits, address ROM deficits, address strength deficits, analyze and address soft tissue restrictions and analyze and cue movement patterns to attain remaining goals. [x]  See Plan of Care  []  See progress note/recertification  []  See Discharge Summary         Progress towards goals / Updated goals:    Short Term Goals: To be accomplished in 6 treatments. Patient will be able to perform HEP independently to obtain full extension and flexion. Patient will be able to perform sit to stand from low chair with flexion of over 115 degrees. Patient will be able to ambulate community distance greater than 10 minutes with no assistive device. Long Term Goals: To be accomplished in 12 treatments. Patient will be able to perform sit to stand from low chair with right knee strength of 5/5 with no difficulty.   Patient will be able to return to normal gait with no assistive for community distances as long as he likes. Patient will be able to achieve -5 degrees of extension and 120 degrees of right knee to maintain mobility with no difficulty.     PLAN  [x]  Upgrade activities as tolerated     [x]  Continue plan of care  []  Update interventions per flow sheet       []  Discharge due to:_  []  Other:_      Kelly Squires, PT,  3/11/2022

## 2022-03-13 RX ORDER — ATORVASTATIN CALCIUM 10 MG/1
TABLET, FILM COATED ORAL
Qty: 90 TABLET | Refills: 1 | Status: SHIPPED | OUTPATIENT
Start: 2022-03-13 | End: 2022-09-16

## 2022-03-15 ENCOUNTER — HOSPITAL ENCOUNTER (OUTPATIENT)
Dept: PHYSICAL THERAPY | Age: 67
Discharge: HOME | End: 2022-03-15
Payer: MEDICARE

## 2022-03-15 PROCEDURE — 97016 VASOPNEUMATIC DEVICE THERAPY: CPT

## 2022-03-15 PROCEDURE — 97150 GROUP THERAPEUTIC PROCEDURES: CPT

## 2022-03-15 PROCEDURE — 97110 THERAPEUTIC EXERCISES: CPT

## 2022-03-15 NOTE — PROGRESS NOTES
PT DAILY TREATMENT NOTE - [facility] 2-15    Patient Name: Roopa Matta  Date:3/15/2022  : 1955  [x]  Patient  Verified  Payor: VA MEDICARE / Plan: VA MEDICARE PART A & B / Product Type: Medicare /    In time: 8:05  Out time: 9:23  Total Treatment Time (min): 78  Total Timed Codes (min): 27  1:1 Treatment Time ( only): 27   Visit #:  3    Treatment Area: Pain in right knee [M25.561]  Unilateral primary osteoarthritis, right knee [M17.11]    SUBJECTIVE  Pain Level (0-10 scale): 1/10  Any medication changes, allergies to medications, adverse drug reactions, diagnosis change, or new procedure performed?: [x] No    [] Yes (see summary sheet for update)  Subjective functional status/changes:   [] No changes reported  \"It's doing good. \"    OBJECTIVE    Modality rationale: decrease edema, decrease inflammation and decrease pain to improve the patients ability to be able to perform AROM   Min Type Additional Details       [] Estim: []Att   []Unatt    []TENS instruct                  []IFC  []Premod   []NMES                    []Other:  []w/US      []w/ heat  []w/ ice  Position:  Location:       []  Traction: [] Cervical       []Lumbar                       [] Prone          []Supine                       []Intermittent   []Continuous Lbs:  [] before manual  [] after manual  [] w/ heat  [] Simultaneously performed with w/ Estim    []  Ultrasound: []Continuous   [] Pulsed                       at: []1MHz   []3MHz Location:  W/cm2:    [] Paraffin         Location:   []w/heat    []  Ice     []  Heat  []  Ice massage Position:  Location:    []  Laser  []  Other: Position:  Location:     10 [x]  Vasopneumatic Device Pressure:       [x] lo [] med [] hi   [x] w/ ice      Temperature: 34deg  [] Simultaneously performed with w/ Estim     [x] Skin assessment post-treatment:  [x]intact []redness- no adverse reaction     []redness - adverse reaction:     27 min Therapeutic Exercise:  [x] See flow sheet :   Rationale: increase ROM and increase strength to improve the patients ability to improve functional mobility         41 min Group Therapy:  [x] See flow sheet :   Billed while completing TE with other pts throughout session    With   [x] TE   [] TA   [] neuro   [] other: Patient Education: [x] Review HEP    [] Progressed/Changed HEP based on:   [] positioning   [] body mechanics   [] transfers   [] heat/ice application    [] other:      Pain Level (0-10 scale) post treatment: 2/10    ASSESSMENT/Changes in Function: The pt tolerated treatment fairly well. Pt c/o increased tightness in R hamstring. Patient will continue to benefit from skilled PT services to address functional mobility deficits, address ROM deficits and address strength deficits to attain remaining goals     [x]  See Plan of Care  []  See progress note/recertification  []  See Discharge Summary         Progress towards goals / Updated goals:  Short Term Goals: To be accomplished in 6 treatments. Patient will be able to perform HEP independently to obtain full extension and flexion. Patient will be able to perform sit to stand from low chair with flexion of over 115 degrees. Patient will be able to ambulate community distance greater than 10 minutes with no assistive device.     Long Term Goals: To be accomplished in 12 treatments. Patient will be able to perform sit to stand from low chair with right knee strength of 5/5 with no difficulty. Patient will be able to return to normal gait with no assistive for community distances as long as he likes. Patient will be able to achieve -5 degrees of extension and 120 degrees of right knee to maintain mobility with no difficulty.     PLAN  [x]  Upgrade activities as tolerated     [x]  Continue plan of care  []  Update interventions per flow sheet       []  Discharge due to:_  []  Other:_      Soheila Bravo, PTA, LPTA 3/15/2022

## 2022-03-18 PROBLEM — M17.11 PRIMARY LOCALIZED OSTEOARTHRITIS OF RIGHT KNEE: Status: ACTIVE | Noted: 2022-01-25

## 2022-03-19 PROBLEM — R03.0 BORDERLINE HYPERTENSION: Status: ACTIVE | Noted: 2020-04-01

## 2022-03-19 PROBLEM — S64.490A: Status: ACTIVE | Noted: 2020-06-15

## 2022-03-19 PROBLEM — S61.210A: Status: ACTIVE | Noted: 2020-06-15

## 2022-03-22 ENCOUNTER — HOSPITAL ENCOUNTER (OUTPATIENT)
Dept: PHYSICAL THERAPY | Age: 67
Discharge: HOME | End: 2022-03-22
Payer: MEDICARE

## 2022-03-22 PROCEDURE — 97150 GROUP THERAPEUTIC PROCEDURES: CPT

## 2022-03-22 PROCEDURE — 97110 THERAPEUTIC EXERCISES: CPT

## 2022-03-22 PROCEDURE — 97016 VASOPNEUMATIC DEVICE THERAPY: CPT

## 2022-03-22 NOTE — PROGRESS NOTES
PT DAILY TREATMENT NOTE - [facility] 2-15    Patient Name: Ole Haas  Date:3/22/2022  : 1955  [x]  Patient  Verified  Payor: Danny Yepez / Plan: VA MEDICARE PART A & B / Product Type: Medicare /    In time: 8:03  Out time: 9:10  Total Treatment Time (min): 67  Total Timed Codes (min): 39  1:1 Treatment Time ([facility] only): 44   Visit #:  4    Treatment Area: Pain in right knee [M25.561]  Unilateral primary osteoarthritis, right knee [M17.11]    SUBJECTIVE  Pain Level (0-10 scale): 0/10  Any medication changes, allergies to medications, adverse drug reactions, diagnosis change, or new procedure performed?: [x] No    [] Yes (see summary sheet for update)  Subjective functional status/changes:   [] No changes reported  \"It gets better everyday. \"    OBJECTIVE    Modality rationale: decrease edema, decrease inflammation and decrease pain to improve the patients ability to be able to perform AROM   Min Type Additional Details       [] Estim: []Att   []Unatt    []TENS instruct                  []IFC  []Premod   []NMES                    []Other:  []w/US      []w/ heat  []w/ ice  Position:  Location:       []  Traction: [] Cervical       []Lumbar                       [] Prone          []Supine                       []Intermittent   []Continuous Lbs:  [] before manual  [] after manual  [] w/ heat  [] Simultaneously performed with w/ Estim    []  Ultrasound: []Continuous   [] Pulsed                       at: []1MHz   []3MHz Location:  W/cm2:    [] Paraffin         Location:   []w/heat    []  Ice     []  Heat  []  Ice massage Position:  Location:    []  Laser  []  Other: Position:  Location:     10 [x]  Vasopneumatic Device Pressure:       [x] lo [] med [] hi   [x] w/ ice      Temperature: 34deg  [] Simultaneously performed with w/ Estim     [x] Skin assessment post-treatment:  [x]intact []redness- no adverse reaction     []redness - adverse reaction:     39 min Therapeutic Exercise:  [x] See flow sheet :   Rationale: increase ROM and increase strength to improve the patients ability to improve functional mobility         18 min Group Therapy:  [x] See flow sheet :   Billed while completing TE with another pt towards end of session    With   [x] TE   [] TA   [] neuro   [] other: Patient Education: [x] Review HEP    [] Progressed/Changed HEP based on:   [] positioning   [] body mechanics   [] transfers   [] heat/ice application    [] other:      Other Objective/Functional Measures: Progressed strengthening    Pain Level (0-10 scale) post treatment: 2/10    ASSESSMENT/Changes in Function: The pt tolerated treatment fairly well. Progressed strengthening today without c/o increased pain. Decreased knee extension today on Biodex PROM compared to last time. Patient will continue to benefit from skilled PT services to address functional mobility deficits, address ROM deficits and address strength deficits to attain remaining goals     [x]  See Plan of Care  []  See progress note/recertification  []  See Discharge Summary         Progress towards goals / Updated goals:  Short Term Goals: To be accomplished in 6 treatments. Patient will be able to perform HEP independently to obtain full extension and flexion. Patient will be able to perform sit to stand from low chair with flexion of over 115 degrees. Patient will be able to ambulate community distance greater than 10 minutes with no assistive device.     Long Term Goals: To be accomplished in 12 treatments. Patient will be able to perform sit to stand from low chair with right knee strength of 5/5 with no difficulty. Patient will be able to return to normal gait with no assistive for community distances as long as he likes. Patient will be able to achieve -5 degrees of extension and 120 degrees of right knee to maintain mobility with no difficulty.     PLAN  [x]  Upgrade activities as tolerated     [x]  Continue plan of care  []  Update interventions per flow sheet       [] Discharge due to:_  []  Other:_      Darell Marcelo PTA, LPTA 3/22/2022

## 2022-03-24 ENCOUNTER — HOSPITAL ENCOUNTER (OUTPATIENT)
Dept: PHYSICAL THERAPY | Age: 67
Discharge: HOME | End: 2022-03-24
Payer: MEDICARE

## 2022-03-24 PROCEDURE — 97110 THERAPEUTIC EXERCISES: CPT

## 2022-03-24 PROCEDURE — 97150 GROUP THERAPEUTIC PROCEDURES: CPT

## 2022-03-24 PROCEDURE — 97016 VASOPNEUMATIC DEVICE THERAPY: CPT

## 2022-03-24 NOTE — PROGRESS NOTES
PT DAILY TREATMENT NOTE - [facility] 2-15    Patient Name: Charla Murcia  Date:3/24/2022  : 1955  [x]  Patient  Verified  [place]or: [place] MED[place] / Plan: [place] MEDICARE PART A & B / Product Type: Medicare /    In time:3:00 PM  Out time:3:55 PM  Total Treatment Time (min): 55  Total Timed Codes (min): 20  1:1 Treatment Time ( only): 20   Visit #:  5    Treatment Area: Pain in right knee [M25.561]  Unilateral primary osteoarthritis, right knee [M17.11]    SUBJECTIVE  Pain Level (0-10 scale): 0/10  Any medication changes, allergies to medications, adverse drug reactions, diagnosis change, or new procedure performed?: [x] No    [] Yes (see summary sheet for update)  Subjective functional status/changes:   [] No changes reported    Doing alright. I am walking better. OBJECTIVE    Modality rationale: decrease edema and decrease pain to improve the patients ability to to return to normal gait.    Min Type Additional Details       [] Estim: []Att   []Unatt    []TENS instruct                  []IFC  []Premod   []NMES                    []Other:  []w/US      []w/ heat  []w/ ice  Position:  Location:       []  Traction: [] Cervical       []Lumbar                       [] Prone          []Supine                       []Intermittent   []Continuous Lbs:  [] before manual  [] after manual  [] w/ heat  [] Simultaneously performed with w/ Estim    []  Ultrasound: []Continuous   [] Pulsed                       at: []1MHz   []3MHz Location:  W/cm2:    [] Paraffin         Location:   []w/heat    []  Ice     []  Heat  []  Ice massage Position:  Location:    []  Laser  []  Other: Position:  Location:     10 [x]  Vasopneumatic Device Pressure:       [x] lo [] med [] hi   [x] w/ ice      Temperature:   [] Simultaneously performed with w/ Estim     [x] Skin assessment post-treatment:  [x]intact []redness- no adverse reaction     []redness - adverse reaction:     20 min Therapeutic Exercise:  [] See flow sheet :   Rationale: increase ROM, increase strength, improve coordination, improve balance and increase proprioception to improve the patients ability   to to return to normal  gait. 25 min Group Therapy:  [x] See flow sheet :   Billed while completing with another patient. With   [] TE   [] TA   [] neuro   [] other: Patient Education: [x] Review HEP    [] Progressed/Changed HEP based on:   [] positioning   [] body mechanics   [] transfers   [] heat/ice application    [] other:      Pain Level (0-10 scale) post treatment: 2/10    ASSESSMENT/Changes in Function:   The pt tolerated treatment. Progressing well with flexion. Continue to work on the end range of extension. Patient will continue to benefit from skilled PT services to modify and progress therapeutic interventions, address functional mobility deficits, address ROM deficits, address strength deficits and analyze and address soft tissue restrictions to attain remaining goals     [x]  See Plan of Care  []  See progress note/recertification  []  See Discharge Summary              Progress towards goals / Updated goals:  Short Term Goals: To be accomplished in 6 treatments. Patient will be able to perform HEP independently to obtain full extension and flexion. Patient will be able to perform sit to stand from low chair with flexion of over 115 degrees. Patient will be able to ambulate community distance greater than 10 minutes with no assistive device.     Long Term Goals: To be accomplished in 12 treatments. Patient will be able to perform sit to stand from low chair with right knee strength of 5/5 with no difficulty. Patient will be able to return to normal gait with no assistive for community distances as long as he likes. Patient will be able to achieve -5 degrees of extension and 120 degrees of right knee to maintain mobility with no difficulty.     PLAN  [x]  Upgrade activities as tolerated     [x]  Continue plan of care  []  Update interventions per flow sheet       [] Discharge due to:_  []  Other:_      Levora Shown, PT,  3/24/2022

## 2022-03-27 ENCOUNTER — PATIENT MESSAGE (OUTPATIENT)
Dept: INTERNAL MEDICINE CLINIC | Age: 67
End: 2022-03-27

## 2022-03-28 RX ORDER — COLCHICINE 0.6 MG/1
0.6 TABLET ORAL 2 TIMES DAILY
Qty: 20 TABLET | Refills: 1 | Status: SHIPPED | OUTPATIENT
Start: 2022-03-28 | End: 2022-04-07

## 2022-03-28 NOTE — TELEPHONE ENCOUNTER
Patient is requesting a refill for RX that has ended.     colchicine (COLCRYS) 0.6 mg tablet [[id number]]  ENDED    Order Details  Dose: 0.6 mg Route: Oral Frequency: 2 TIMES DAILY   Dispense Quantity: 20 Tablet Refills: 1          Sig: Take 1 Tab by mouth two (2) times a day for 10 days.         Start Date: 05/01/19 End Date: 05/11/19 after 20 doses   Written Date: 05/01/19 Expiration Date: --

## 2022-03-29 ENCOUNTER — HOSPITAL ENCOUNTER (OUTPATIENT)
Dept: PHYSICAL THERAPY | Age: 67
Discharge: HOME | End: 2022-03-29
Payer: MEDICARE

## 2022-03-29 PROCEDURE — 97110 THERAPEUTIC EXERCISES: CPT

## 2022-03-29 PROCEDURE — 97016 VASOPNEUMATIC DEVICE THERAPY: CPT

## 2022-03-29 NOTE — PROGRESS NOTES
PT DAILY TREATMENT NOTE - [facility] 2-15    Patient Name: Donnamae Cogan  Date:3/29/2022  : 1955  [x]  Patient  Verified  Payor: Jossy Sheth / Plan: [place] MEDICARE PART A & B / Product Type: Medicare /    In time:8:08  Out time:9:06  Total Treatment Time (min): 58  Total Timed Codes (min): 58  1:1 Treatment Time ( [address] only): 62   Visit #:  6    Treatment Area: Pain in right knee [M25.561]  Unilateral primary osteoarthritis, right knee [M17.11]    SUBJECTIVE  Pain Level (0-10 scale): 2/10  Any medication changes, allergies to medications, adverse drug reactions, diagnosis change, or new procedure performed?: [x] No    [] Yes (see summary sheet for update)  Subjective functional status/changes:   [] No changes reported  \"Reports he has been hurting in both knees since his last P.T. session. Reports he feels like he overdid it on last session. Reports no pain in left knee at close of session and reports 2/10 in Rt. Knee at close of session. Eric Records \"    OBJECTIVE    Modality rationale: decrease edema, decrease inflammation and decrease pain to improve the patients ability to decrease pain and increase tolerance to functional mobility.     Min Type Additional Details       [] Estim: []Att   []Unatt    []TENS instruct                  []IFC  []Premod   []NMES                    []Other:  []w/US      []w/ heat  []w/ ice  Position:  Location:       []  Traction: [] Cervical       []Lumbar                       [] Prone          []Supine                       []Intermittent   []Continuous Lbs:  [] before manual  [] after manual  [] w/ heat  [] Simultaneously performed with w/ Estim    []  Ultrasound: []Continuous   [] Pulsed                       at: []1MHz   []3MHz Location:  W/cm2:    [] Paraffin         Location:   []w/heat    []  Ice     []  Heat  []  Ice massage Position:  Location:    []  Laser  []  Other: Position:  Location:     10 [x]  Vasopneumatic Device Pressure:       [] lo [x] med [] hi   [x] w/ ice      Temperature: 34F[] Simultaneously performed with w/ Estim     [x] Skin assessment post-treatment:  [x]intact []redness- no adverse reaction     []redness - adverse reaction:     48 min Therapeutic Exercise:  [x] See flow sheet :   Rationale: increase ROM and increase strength to improve the patients ability to attain and maintain highest practicable functional capacity. With   [] TE   [] TA   [] neuro   [] other: Patient Education: [x] Review HEP    [] Progressed/Changed HEP based on:   [] positioning   [] body mechanics   [] transfers   [] heat/ice application    [] other:      Other Objective/Functional Measures: N/A    Pain Level (0-10 scale) post treatment: 2/10    ASSESSMENT/Changes in Function:   The pt tolerated treatment well. Treatment this date tailored to address report of increased pain since last P.T. session as per flow sheet. Patient demonstrates relief in left knee pain at close of session. Patient will continue to benefit from skilled PT services to modify and progress therapeutic interventions, address functional mobility deficits, address ROM deficits, address strength deficits and analyze and address soft tissue restrictions to attain remaining goals     [x]  See Plan of Care  []  See progress note/recertification  []  See Discharge Summary         Progress towards goals / Updated goals:  Short Term Goals: To be accomplished in 6 treatments. Patient will be able to perform HEP independently to obtain full extension and flexion. Patient will be able to perform sit to stand from low chair with flexion of over 115 degrees. Patient will be able to ambulate community distance greater than 10 minutes with no assistive device.     Long Term Goals: To be accomplished in 12 treatments. Patient will be able to perform sit to stand from low chair with right knee strength of 5/5 with no difficulty.   Patient will be able to return to normal gait with no assistive for community distances as long as he likes. Patient will be able to achieve -5 degrees of extension and 120 degrees of right knee to maintain mobility with no difficulty.     PLAN  [x]  Upgrade activities as tolerated     [x]  Continue plan of care  []  Update interventions per flow sheet       []  Discharge due to:_  []  Other:_      Garrick Liu PTA, L.P.T.A. 3/29/2022

## 2022-03-31 ENCOUNTER — APPOINTMENT (OUTPATIENT)
Dept: PHYSICAL THERAPY | Age: 67
End: 2022-03-31
Payer: MEDICARE

## 2022-04-04 ENCOUNTER — HOSPITAL ENCOUNTER (OUTPATIENT)
Dept: PHYSICAL THERAPY | Age: 67
Discharge: HOME | End: 2022-04-04
Payer: MEDICARE

## 2022-04-04 PROCEDURE — 97016 VASOPNEUMATIC DEVICE THERAPY: CPT

## 2022-04-04 PROCEDURE — 97110 THERAPEUTIC EXERCISES: CPT

## 2022-04-04 NOTE — PROGRESS NOTES
PT DAILY TREATMENT NOTE - [facility] 2-15    Patient Name: Cristina Hall  Date:2022  : 1955  [x]  Patient  Verified  Payor: [place] MEDICARE / Plan: [place] MEDICARE PART A & B / Product Type: Medicare /    In time:7:35 AM  Out time: 8:25 AM  Total Treatment Time (min): 50   Total Timed Codes (min): 50  1:1 Treatment Time ( only): 50   Visit #:  7    Treatment Area: Pain in right knee [M25.561]  Unilateral primary osteoarthritis, right knee [M17.11]    SUBJECTIVE  Pain Level (0-10 scale): 2/10  Any medication changes, allergies to medications, adverse drug reactions, diagnosis change, or new procedure performed?: [x] No    [] Yes (see summary sheet for update)  Subjective functional status/changes:   [] No changes reported    I had gout and was sick from that bug that was going around last    OBJECTIVE    Modality rationale: decrease inflammation and decrease pain to improve the patients ability to return to normal gait with no assistive for community distances as long as he likes.    Min Type Additional Details       [] Estim: []Att   []Unatt    []TENS instruct                  []IFC  []Premod   []NMES                    []Other:  []w/US      []w/ heat  []w/ ice  Position:  Location:       []  Traction: [] Cervical       []Lumbar                       [] Prone          []Supine                       []Intermittent   []Continuous Lbs:  [] before manual  [] after manual  [] w/ heat  [] Simultaneously performed with w/ Estim    []  Ultrasound: []Continuous   [] Pulsed                       at: []1MHz   []3MHz Location:  W/cm2:    [] Paraffin         Location:   []w/heat    []  Ice     []  Heat  []  Ice massage Position:  Location:    []  Laser  []  Other: Position:  Location:     10 [x]  Vasopneumatic Device Pressure:       [x] lo [] med [] hi   [x] w/ ice      Temperature: 34[] Simultaneously performed with w/ Estim     [x] Skin assessment post-treatment:  [x]intact []redness- no adverse reaction     []redness - adverse reaction:     40 min Therapeutic Exercise:  [x] See flow sheet :   Rationale: increase ROM, increase strength, improve coordination, improve balance and increase proprioception    to improve the patients ability to return to normal gait with no assistive for community distances as long as he likes. With   [] TE   [] TA   [] neuro   [] other: Patient Education: [x] Review HEP    [] Progressed/Changed HEP based on:   [] positioning   [] body mechanics   [] transfers   [] heat/ice application    [] other:      Other Objective:Strengthening and range of motion for right knee replacement    Pain Level (0-10 scale) post treatment:0/10    ASSESSMENT/Changes in Function:   The pt tolerated treatment. Returning to normal gait. ROM in extension is progressing well. Patient will continue to benefit from skilled PT services to address ROM deficits, address strength deficits and analyze and address soft tissue restrictions to attain remaining goals     [x]  See Plan of Care  []  See progress note/recertification  []  See Discharge Summary         Progress towards goals / Updated goals:  Short Term Goals: To be accomplished in 6 treatments. Patient will be able to perform HEP independently to obtain full extension and flexion. Patient will be able to perform sit to stand from low chair with flexion of over 115 degrees. Patient will be able to ambulate community distance greater than 10 minutes with no assistive device.     Long Term Goals: To be accomplished in 12 treatments. Patient will be able to perform sit to stand from low chair with right knee strength of 5/5 with no difficulty. Patient will be able to return to normal gait with no assistive for community distances as long as he likes.   Patient will be able to achieve -5 degrees of extension and 120 degrees of right knee to maintain mobility with no difficulty.       PLAN  [x]  Upgrade activities as tolerated     [x]  Continue plan of care  [] Update interventions per flow sheet       []  Discharge due to:_  []  Other:_      Rianna Salmon, PT,  4/4/2022

## 2022-04-07 ENCOUNTER — HOSPITAL ENCOUNTER (OUTPATIENT)
Dept: PHYSICAL THERAPY | Age: 67
Discharge: HOME | End: 2022-04-07
Payer: MEDICARE

## 2022-04-07 PROCEDURE — 97110 THERAPEUTIC EXERCISES: CPT

## 2022-04-07 NOTE — PROGRESS NOTES
[address]  [place], [place]  Ph: [phone]     Fax: [phone]    Discharge Summary 2-15      Patient name: Donnamae Cogan  : 1955  Provider#: [id number]  Referral source: Koffi Gale MD      Medical/Treatment Diagnosis: Pain in right knee [M25.561]  Unilateral primary osteoarthritis, right knee [M17.11]     Prior Hospitalization: see medical history     Comorbidities: See Plan of Care  Prior Level of Function: See Plan of Care  Medications: Verified on Patient Summary List    Start of Care: 3/9/22       Onset Date:2/10/22   Visits from Start of Care: 8   Missed Visits: 2  Reporting Period : 3/9/22 to 2022    Assessment / Summary of care:   78year old white male with right  knee surgery that was performed on 2022. Patient received Home Health Physical Therapy for 2 weeks and was referred to Outpatient Physical Therapy following MD visit on 2022. Patient has completed 8 visits. Patient is now ambulating independently with no assistive device. He has met Physical Therapy short term goals. Patient has returned to his prior level of function and returned to work. He has shown significant improvement in strength and range of motion. Patient is advised to continue with strengthening in order to perform knee extension with no difficulty. Patient has benefited from skilled PT services to address functional mobility deficits, address ROM deficits, address strength deficits and analyze and address soft tissue restrictions. Physical Therapy interventions has included progressive gait training, range of motion exercises to increase full extension and flexion. Patient has been  provided with  Home exercise with 1:1 supervision for compliance to return to maximal level of function. Patient will be returning to MD on 22.  Patient has been discharged from Physical Therapy on home program.         Progress towards goals / Updated goals:  Short Term Goals: To be accomplished in 6 treatments. Patient will be able to perform HEP independently to obtain full extension and flexion. MET  Patient will be able to perform sit to stand from low chair with flexion of over 115 degrees. MET  Patient will be able to ambulate community distance greater than 10 minutes with no assistive device.       Long Term Goals: To be accomplished in 12 treatments. Patient will be able to perform sit to stand from low chair with right knee strength of 5/5 with no difficulty. MET  Patient will be able to return to normal gait with no assistive for community distances as long as he likes. MET  Patient will be able to achieve -5 degrees of extension and 120 degrees of right knee to maintain mobility with no difficulty.  Partially MET      RECOMMENDATIONS:  [x]Discontinue therapy: []Patient has reached or is progressing toward set goals     []Patient is non-compliant or has abdicated     []Due to lack of appreciable progress towards set goals     []Other      Chandler Jang, PT,  4/7/2022

## 2022-04-07 NOTE — PROGRESS NOTES
PT DAILY TREATMENT NOTE - [facility] 2-15    Patient Name: Siobhan Morrison  Date:2022  : 1955  [x]  Patient  Verified  Payor: [place] MEDICARE / Plan: VA MEDICARE PART A & B / Product Type: Medicare /    In time: 8:05 AM  Out time:8:30 AM  Total Treatment Time (min): 25  Total Timed Codes (min): 25  1:1 Treatment Time ( only): 25   Visit #:  8    Treatment Area: Pain in right knee [M25.561]  Unilateral primary osteoarthritis, right knee [M17.11]    SUBJECTIVE  Pain Level (0-10 scale): 0/10  Any medication changes, allergies to medications, adverse drug reactions, diagnosis change, or new procedure performed?: [x] No    [] Yes (see summary sheet for update)  Subjective functional status/changes:   [] No changes reported  I have returned to performing my daily activities and work. Knee is doing much better. Now,  I do not have to be so careful with my walking. Plan to get the other knee done next year. OBJECTIVE    25 min Therapeutic Exercise:  [x] See flow sheet :   Rationale: increase ROM, increase strength, improve coordination, improve balance and increase proprioception    to improve the patients ability to return to normal gait. With   [] TE   [] TA   [] neuro   [] other: Patient Education: [x] Review HEP    [] Progressed/Changed HEP based on:   [] positioning   [] body mechanics   [] transfers   [] heat/ice application    [] other:      Other Objective: Strengthening and stretching exercises for right total knee  AROM: right knee 115 flexion, 0 Extension with slight overpressure  Gait: independent    Pain Level (0-10 scale) post treatment: 0/10    ASSESSMENT/Changes in Function:   The pt tolerated treatment. Patient has completed 8 visits of Physical Therapy. He has met Physical Therapy long term and short term goals. Patient has returned to his prior level of function and returned to work. He has shown significant improvement in strength and range of motion.  Patient is advised to continue with strengthening in order to perform 0 degrees of knee extension with no difficulty. Patient has benefited from skilled PT services to address functional mobility deficits, address ROM deficits, address strength deficits and analyze and address soft tissue restrictions. [x]  See Plan of Care  []  See progress note/recertification  []  See Discharge Summary              Progress towards goals / Updated goals:  Short Term Goals: To be accomplished in 6 treatments. Patient will be able to perform HEP independently to obtain full extension and flexion. MET  Patient will be able to perform sit to stand from low chair with flexion of over 115 degrees. MET  Patient will be able to ambulate community distance greater than 10 minutes with no assistive device.     Long Term Goals: To be accomplished in 12 treatments. Patient will be able to perform sit to stand from low chair with right knee strength of 5/5 with no difficulty. MET  Patient will be able to return to normal gait with no assistive for community distances as long as he likes. MET  Patient will be able to achieve -5 degrees of extension and 120 degrees of right knee to maintain mobility with no difficulty.  Partially MET    PLAN  []  Upgrade activities as tolerated     []  Continue plan of care  []  Update interventions per flow sheet       []  Discharge due to:_  []  Other:_      Amanda Hyman, PT, 4/7/2022

## 2022-04-08 ENCOUNTER — OFFICE VISIT (OUTPATIENT)
Dept: ORTHOPEDIC SURGERY | Age: 67
End: 2022-04-08
Payer: MEDICARE

## 2022-04-08 VITALS — HEIGHT: 71 IN | WEIGHT: 209 LBS | BODY MASS INDEX: 29.26 KG/M2

## 2022-04-08 DIAGNOSIS — Z96.651 STATUS POST RIGHT KNEE REPLACEMENT: Primary | ICD-10-CM

## 2022-04-08 DIAGNOSIS — Z09 SURGERY FOLLOW-UP: ICD-10-CM

## 2022-04-08 PROCEDURE — 99024 POSTOP FOLLOW-UP VISIT: CPT | Performed by: ORTHOPAEDIC SURGERY

## 2022-04-08 NOTE — PROGRESS NOTES
Soledad Shrestha (: 1955) is a 78 y.o. male, patient, here for evaluation of the following chief complaint(s):  Surgical Follow-up (RTk #2 F/U)       SUBJECTIVE/OBJECTIVE:  Soledad Shrestha presents today for routine follow-up approximately 8 weeks out from right primary total knee replacement. Overall is very happy with progress. He was just discharged from outpatient therapy. Mild soreness with activities but no pain. PHYSICAL EXAM:  Vitals: Ht 5' 11\" (1.803 m)   Wt 209 lb (94.8 kg)   BMI 29.15 kg/m²   Body mass index is 29.15 kg/m². 78y.o. year old M, no distress. Ambulates without a limp. Right anterior knee incision is well-healed. Trace residual local warmth. Full active knee extension with flexion to approximately 115 degrees. Preoperative motion 5 to 115 degrees. Stable throughout arc of motion. Symmetrical palpable distal pulses. No gross motor or sensory deficits in lower extremities. No distal edema. IMAGING:  Radiographs: No new images today. ASSESSMENT/PLAN:  1. Status post right knee replacement  2. Surgery follow-up    The xray and exam findings were discussed with the patient today. Satisfactory progress 8 weeks postop. Continue home exercise program and low impact activities as tolerated. Return in 10 months for routine 1 year postop x-ray right knee, sooner if needed     No follow-ups on file. Review Of Systems  ROS     Positive for: Musculoskeletal    Last edited by Shruti Toscano RN on 2022  9:36 AM. (History)         Patient denies any recent fever, chills, nausea, vomiting, chest pain, or shortness of breath.     No Known Allergies    Current Outpatient Medications   Medication Sig    atorvastatin (LIPITOR) 10 mg tablet TAKE 1 TABLET BY MOUTH EVERY DAY    methylPREDNISolone (MEDROL DOSEPACK) 4 mg tablet Per dose pack instructions    senna-docusate (PERICOLACE) 8.6-50 mg per tablet Take 1 Tablet by mouth two (2) times daily as needed for Constipation.  lisinopril-hydroCHLOROthiazide (PRINZIDE, ZESTORETIC) 10-12.5 mg per tablet Take 1 Tablet by mouth daily. (Patient taking differently: Take 1 Tablet by mouth nightly.)    doxycycline (VIBRAMYCIN) 100 mg capsule TAKE 1 CAPSULE BY MOUTH EVERY DAY (Patient taking differently: nightly.)    metFORMIN ER (GLUCOPHAGE XR) 500 mg tablet TAKE 3 TABLETS BY MOUTH EVERY DAY WITH DINNER    sildenafil citrate (Viagra) 100 mg tablet Take 1 Tab by mouth as needed for Erectile Dysfunction.  aspirin delayed-release 81 mg tablet Take 1 Tablet by mouth two (2) times a day. Indications: blood clot prevention (Patient not taking: Reported on 4/8/2022)    acetaminophen (TYLENOL) 500 mg tablet Take 1 Tablet by mouth every four (4) hours.  ciprofloxacin HCl (CIPRO) 500 mg tablet as needed (FOR DIVERTICULITIS FLARE UPS). (Patient not taking: Reported on 1/25/2022)     No current facility-administered medications for this visit. Past Medical History:   Diagnosis Date    Arthralgia of knee 7/15/2013    Diabetes ([facility] [address]     type 2    Diverticular disease of left colon 3/22/2013    GERD (gastroesophageal reflux disease)     Gout 12/6/1985    Hypertension     SLIGHTLY PER PT'S PCP        Past Surgical History:   Procedure Laterality Date    HX COLONOSCOPY  2000    Dr. Katherine Trinidad    HX HEENT      deviated septum age 12     [address]     HX OTHER SURGICAL  1/21/16    excision of lymph node right axilla    HX TONSILLECTOMY      child       Family History   Problem Relation Age of Onset    Mental Retardation Mother     Cancer Father         lung    Diabetes Maternal Grandmother     Diabetes Paternal Grandmother         Social History     Socioeconomic History    Marital status:      Spouse name: Not on file    Number of children: Not on file    Years of education: Not on file    Highest education level: Not on file   Occupational History    Not on file   Tobacco Use    Smoking status: Never Smoker    Smokeless tobacco: Never Used   Vaping Use    Vaping Use: Never used   Substance and Sexual Activity    Alcohol use: Yes     Comment: OCCASSIONELY    Drug use: No    Sexual activity: Yes     Partners: Female   Other Topics Concern    Not on file   Social History Narrative    Not on file     Social Determinants of Health     Financial Resource Strain:     Difficulty of Paying Living Expenses: Not on file   Food Insecurity:     Worried About Running Out of Food in the Last Year: Not on file    Roxanna of Food in the Last Year: Not on file   Transportation Needs:     Lack of Transportation (Medical): Not on file    Lack of Transportation (Non-Medical): Not on file   Physical Activity:     Days of Exercise per Week: Not on file    Minutes of Exercise per Session: Not on file   Stress:     Feeling of Stress : Not on file   Social Connections:     Frequency of Communication with Friends and Family: Not on file    Frequency of Social Gatherings with Friends and Family: Not on file    Attends Mandaen Services: Not on file    Active Member of [address] [place] [address] or Organizations: Not on file    Attends Club or Organization Meetings: Not on file    Marital Status: Not on file   Intimate Partner Violence:     Fear of Current or Ex-Partner: Not on file    Emotionally Abused: Not on file    Physically Abused: Not on file    Sexually Abused: Not on file   Housing Stability:     Unable to Pay for Housing in the Last Year: Not on file    Number of [place] in the Last Year: Not on file    Unstable Housing in the Last Year: Not on file       No orders of the defined types were placed in this encounter. An electronic signature was used to authenticate this note.   -- Rosanna Moncada MD

## 2022-04-08 NOTE — LETTER
4/8/2022    Patient: Siobhan Morrison   YOB: 1955   Date of Visit: 4/8/2022     Ric Gutierrez, [address] [address]  Via In Basket    Dear Ric Gutierrez MD,      Thank you for referring Mr. Lizy Lombardi to [facility] for evaluation. My notes for this consultation are attached. If you have questions, please do not hesitate to call me. I look forward to following your patient along with you.       Sincerely,    Fuad Kim MD

## 2022-06-12 RX ORDER — METFORMIN HYDROCHLORIDE 500 MG/1
TABLET, EXTENDED RELEASE ORAL
Qty: 270 TABLET | Refills: 0 | Status: SHIPPED | OUTPATIENT
Start: 2022-06-12 | End: 2022-09-16

## 2022-06-20 NOTE — TELEPHONE ENCOUNTER
Future Appointments:  Future Appointments   Date Time Provider Tamika Graf   2/10/2023 10:00 AM Maria C Wiseman MD [facility] AMB        Last Appointment With Me:  1/19/2022     Requested Prescriptions     Pending Prescriptions Disp Refills    sildenafil citrate (Viagra) 100 mg tablet 10 Tablet 2     Sig: Take 1 Tablet by mouth as needed for Erectile Dysfunction.

## 2022-06-21 RX ORDER — SILDENAFIL 100 MG/1
100 TABLET, FILM COATED ORAL AS NEEDED
Qty: 10 TABLET | Refills: 2 | Status: SHIPPED | OUTPATIENT
Start: 2022-06-21 | End: 2022-06-23 | Stop reason: SDUPTHER

## 2022-06-21 NOTE — TELEPHONE ENCOUNTER
Future Appointments:  Future Appointments   Date Time Provider Tamika Graf   2/10/2023 10:00 AM Latricia Sanchez MD [facility] AMB        Last Appointment With Me:  1/19/2022     Requested Prescriptions     Pending Prescriptions Disp Refills    sildenafil citrate (Viagra) 100 mg tablet 10 Tablet 2     Sig: Take 1 Tablet by mouth as needed for Erectile Dysfunction.

## 2022-06-22 ENCOUNTER — PATIENT MESSAGE (OUTPATIENT)
Dept: INTERNAL MEDICINE CLINIC | Age: 67
End: 2022-06-22

## 2022-06-23 RX ORDER — SILDENAFIL 100 MG/1
100 TABLET, FILM COATED ORAL
Qty: 10 TABLET | Refills: 2 | Status: SHIPPED | OUTPATIENT
Start: 2022-06-23 | End: 2022-08-17 | Stop reason: SDUPTHER

## 2022-06-23 NOTE — TELEPHONE ENCOUNTER
From: Siri Bell  To: Darren Arce MD  Sent: 6/22/2022 4:27 PM EDT  Subject: Prescription information request     [facility] which is the pharmacy connected [facility] requires a daily dosing instruction rather than as needed. Can you contact them at [phone] and give them the info over the phone?   Thanks   Lg Sarah   This is for the Sildenafil prescription

## 2022-08-17 ENCOUNTER — PATIENT MESSAGE (OUTPATIENT)
Dept: INTERNAL MEDICINE CLINIC | Age: 67
End: 2022-08-17

## 2022-08-17 DIAGNOSIS — K57.30 DIVERTICULAR DISEASE OF LEFT COLON: Primary | ICD-10-CM

## 2022-08-18 RX ORDER — CEPHALEXIN 500 MG/1
500 CAPSULE ORAL 3 TIMES DAILY
Qty: 21 CAPSULE | Refills: 0 | Status: SHIPPED | OUTPATIENT
Start: 2022-08-18

## 2022-08-18 NOTE — TELEPHONE ENCOUNTER
Patient is taking a trip to [place] and is asking for medication to help manage his diverticulitis while being away

## 2022-08-21 DIAGNOSIS — L71.9 ACNE ROSACEA: ICD-10-CM

## 2022-08-21 DIAGNOSIS — L71.9 ROSACEA: ICD-10-CM

## 2022-08-22 RX ORDER — DOXYCYCLINE 100 MG/1
100 CAPSULE ORAL DAILY
Qty: 90 CAPSULE | Refills: 1 | OUTPATIENT
Start: 2022-08-22

## 2022-09-16 RX ORDER — ATORVASTATIN CALCIUM 10 MG/1
TABLET, FILM COATED ORAL
Qty: 90 TABLET | Refills: 1 | Status: SHIPPED | OUTPATIENT
Start: 2022-09-16

## 2023-01-27 ENCOUNTER — OFFICE VISIT (OUTPATIENT)
Dept: ORTHOPEDIC SURGERY | Age: 68
End: 2023-01-27
Payer: MEDICARE

## 2023-01-27 DIAGNOSIS — M17.12 PRIMARY OSTEOARTHRITIS OF LEFT KNEE: Primary | ICD-10-CM

## 2023-01-27 DIAGNOSIS — Z96.651 STATUS POST TOTAL KNEE REPLACEMENT, RIGHT: ICD-10-CM

## 2023-01-27 NOTE — PROGRESS NOTES
Rock Wheeler (: 1955) is a 75 y.o. male, patient, here for evaluation of the following chief complaint(s):  Knee Pain (Left /)       SUBJECTIVE/OBJECTIVE:  Rock Wheeler presents today complaining of progressive left knee pain, and also for routine follow-up 1 year after right total knee replacement. Left knee pain is constant. Aches at rest.  Occasional wakening neck pain. Difficulty climbing stairs. He does not trust the knee on uneven ground. Pain and stiffness after prolonged sitting. Cannot stand still in 1 position for more than a few minutes. Remote history of cortisone injections with decreasing efficacy. History of right total knee replacement in 2022, doing well. No pain at rest, no pain at night, no pain with activity. Denies subjective instability. Right knee does not limit him. PHYSICAL EXAM:  Vitals: There were no vitals taken for this visit. There is no height or weight on file to calculate BMI. 75y.o. year old M in no acute distress. Ambulates with a limp on the left. Pain-free motion of both hips. Right total knee neutral alignment. Healed midline anterior scar. Satisfactory range of motion. Stable throughout arc of motion. Varus alignment left knee. Tender medially. ROM 0-120. Pseudolaxity is present. Palpable Baker's cyst, nonpulsatile. Skin warm, dry. No effusion. Motor 5/5. No distal edema. IMAGING:  Radiographs: XR Results (most recent):  Results from Appointment encounter on 23    XR KNEES BI MIN 4 V    Narrative  4 x-ray views of both knees including AP and PA flexion, lateral, sunrise demonstrate satisfactory position and alignment of cemented total knee components in the right knee. No evidence of loosening. Patella tracks centrally with lateral patellar tilt. Severe osteoarthritis in the left knee with complete loss of medial compartment joint space and moderate loss patellofemoral space.   Tricompartmental osteophytes present. ASSESSMENT/PLAN:  1. Left knee pain, unspecified chronicity  -     XR KNEES BI MIN 4 V; Future  2. Status post total knee replacement, right  -     XR KNEES BI MIN 4 V; Future    Well-functioning right total knee replacement, 1 year postop. Severe left knee osteoarthritis. We discussed continued conservative treatment measures vs left total knee replacement. Symptoms have progressed despite conservative treatment measures outlined above and he desires to proceed with left total knee replacement. Discussed risks, benefits, and alternatives in detail, as well as anticipated hospital stay and course of rehabilitation. He will see primary Dr. Roula Boyd prior to surgery. All questions answered. Plan is for IV tranexamic acid intraoperatively, ASA for DVT prophylaxis. No follow-ups on file. Review Of Systems  ROS    Positive for: Musculoskeletal  Last edited by Ailyn Durbin on 1/27/2023 11:10 AM.         Patient denies any recent fever, chills, nausea, vomiting, chest pain, or shortness of breath. No Known Allergies    Current Outpatient Medications   Medication Sig    colchicine 0.6 mg tablet TAKE 1 TABLET BY MOUTH TWICE A DAY FOR 10 DAYS    atorvastatin (LIPITOR) 10 mg tablet TAKE 1 TABLET BY MOUTH EVERY DAY    metFORMIN ER (GLUCOPHAGE XR) 500 mg tablet TAKE 3 TABLETS BY MOUTH EVERY DAY WITH DINNER    sildenafil citrate (Viagra) 100 mg tablet Take 1 Tablet by mouth daily as needed for Erectile Dysfunction. lisinopril-hydroCHLOROthiazide (PRINZIDE, ZESTORETIC) 10-12.5 mg per tablet Take 1 Tablet by mouth daily. (Patient taking differently: Take 1 Tablet by mouth nightly.)    doxycycline (VIBRAMYCIN) 100 mg capsule TAKE 1 CAPSULE BY MOUTH EVERY DAY (Patient taking differently: nightly.)    cephALEXin (KEFLEX) 500 mg capsule Take 1 Capsule by mouth three (3) times daily.     methylPREDNISolone (MEDROL DOSEPACK) 4 mg tablet Per dose pack instructions (Patient not taking: Reported on 1/27/2023)    aspirin delayed-release 81 mg tablet Take 1 Tablet by mouth two (2) times a day. Indications: blood clot prevention (Patient not taking: No sig reported)    senna-docusate (PERICOLACE) 8.6-50 mg per tablet Take 1 Tablet by mouth two (2) times daily as needed for Constipation. (Patient not taking: Reported on 1/27/2023)    acetaminophen (TYLENOL) 500 mg tablet Take 1 Tablet by mouth every four (4) hours. (Patient not taking: Reported on 1/27/2023)    ciprofloxacin HCl (CIPRO) 500 mg tablet as needed (FOR DIVERTICULITIS FLARE UPS). (Patient not taking: No sig reported)     No current facility-administered medications for this visit. Past Medical History:   Diagnosis Date    Arthralgia of knee 7/15/2013    Diabetes ([address]     type 2    Diverticular disease of left colon 3/22/2013    GERD (gastroesophageal reflux disease)     Gout 12/6/1985    Hypertension     SLIGHTLY PER PT'S PCP        Past Surgical History:   Procedure Laterality Date    HX COLONOSCOPY  2000    Dr. Jayjay Trinidad    HX HEENT      deviated septum age 12    HX KNEE REPLACEMENT Right 02/10/2022    HX ORTHOPAEDIC  1974    REPAIR OF CARTILAGE IN RT KNEE JOINT.     HX OTHER SURGICAL  01/21/2016    excision of lymph node right axilla    HX TONSILLECTOMY      child       Family History   Problem Relation Age of Onset    Mental Retardation Mother     Cancer Father         lung    Diabetes Maternal Grandmother     Diabetes Paternal Grandmother         Social History     Socioeconomic History    Marital status:      Spouse name: Not on file    Number of children: Not on file    Years of education: Not on file    Highest education level: Not on file   Occupational History    Not on file   Tobacco Use    Smoking status: Never    Smokeless tobacco: Never   Vaping Use    Vaping Use: Never used   Substance and Sexual Activity    Alcohol use: Yes     Comment: OCCASSIONELY    Drug use: No    Sexual activity: Yes Partners: Female   Other Topics Concern    Not on file   Social History Narrative    Not on file     Social Determinants of Health     Financial Resource Strain: Not on file   Food Insecurity: Not on file   Transportation Needs: Not on file   Physical Activity: Not on file   Stress: Not on file   Social Connections: Not on file   Intimate Partner Violence: Not on file   Housing Stability: Not on file       Orders Placed This Encounter    XR KNEES BI MIN 4 V     Standing Status:   Future     Number of Occurrences:   1     Standing Expiration Date:   1/28/2024      Mere Chinchilla. Edward Rendon M.D. An electronic signature was used to authenticate this note.

## 2023-01-27 NOTE — LETTER
1/27/2023    Patient: Iva Kimble   YOB: 1955   Date of Visit: 1/27/2023     Gracie Rey, [address] [address]  Via In Basket    Dear Gracie Rey MD,      Thank you for referring Mr. Dionte Russo to [facility] for evaluation. My notes for this consultation are attached. If you have questions, please do not hesitate to call me. I look forward to following your patient along with you.       Sincerely,    Kaleb Ball MD

## 2023-01-31 ENCOUNTER — OFFICE VISIT (OUTPATIENT)
Dept: INTERNAL MEDICINE CLINIC | Age: 68
End: 2023-01-31
Payer: MEDICARE

## 2023-01-31 VITALS
OXYGEN SATURATION: 98 % | WEIGHT: 234 LBS | HEART RATE: 86 BPM | HEIGHT: 71 IN | BODY MASS INDEX: 32.76 KG/M2 | RESPIRATION RATE: 18 BRPM | SYSTOLIC BLOOD PRESSURE: 154 MMHG | TEMPERATURE: 97 F | DIASTOLIC BLOOD PRESSURE: 92 MMHG

## 2023-01-31 DIAGNOSIS — I10 ESSENTIAL HYPERTENSION: ICD-10-CM

## 2023-01-31 DIAGNOSIS — E78.5 DYSLIPIDEMIA (HIGH LDL; LOW HDL): ICD-10-CM

## 2023-01-31 DIAGNOSIS — E11.9 TYPE 2 DIABETES MELLITUS WITHOUT COMPLICATION, WITHOUT LONG-TERM CURRENT USE OF INSULIN (HCC): ICD-10-CM

## 2023-01-31 DIAGNOSIS — Z23 NEEDS FLU SHOT: ICD-10-CM

## 2023-01-31 DIAGNOSIS — E11.9 TYPE 2 DIABETES MELLITUS WITHOUT COMPLICATION, WITHOUT LONG-TERM CURRENT USE OF INSULIN (HCC): Primary | ICD-10-CM

## 2023-01-31 DIAGNOSIS — M17.12 PRIMARY OSTEOARTHRITIS OF LEFT KNEE: Primary | ICD-10-CM

## 2023-01-31 PROCEDURE — 2022F DILAT RTA XM EVC RTNOPTHY: CPT | Performed by: INTERNAL MEDICINE

## 2023-01-31 PROCEDURE — 99214 OFFICE O/P EST MOD 30 MIN: CPT | Performed by: INTERNAL MEDICINE

## 2023-01-31 PROCEDURE — 90694 VACC AIIV4 NO PRSRV 0.5ML IM: CPT | Performed by: INTERNAL MEDICINE

## 2023-01-31 PROCEDURE — G8510 SCR DEP NEG, NO PLAN REQD: HCPCS | Performed by: INTERNAL MEDICINE

## 2023-01-31 PROCEDURE — G8417 CALC BMI ABV UP PARAM F/U: HCPCS | Performed by: INTERNAL MEDICINE

## 2023-01-31 PROCEDURE — 1101F PT FALLS ASSESS-DOCD LE1/YR: CPT | Performed by: INTERNAL MEDICINE

## 2023-01-31 PROCEDURE — G8427 DOCREV CUR MEDS BY ELIG CLIN: HCPCS | Performed by: INTERNAL MEDICINE

## 2023-01-31 PROCEDURE — G0463 HOSPITAL OUTPT CLINIC VISIT: HCPCS | Performed by: INTERNAL MEDICINE

## 2023-01-31 PROCEDURE — G8536 NO DOC ELDER MAL SCRN: HCPCS | Performed by: INTERNAL MEDICINE

## 2023-01-31 PROCEDURE — 3017F COLORECTAL CA SCREEN DOC REV: CPT | Performed by: INTERNAL MEDICINE

## 2023-01-31 PROCEDURE — 3044F HG A1C LEVEL LT 7.0%: CPT | Performed by: INTERNAL MEDICINE

## 2023-01-31 RX ORDER — LISINOPRIL AND HYDROCHLOROTHIAZIDE 20; 25 MG/1; MG/1
1 TABLET ORAL DAILY
Qty: 90 TABLET | Refills: 1 | Status: SHIPPED | OUTPATIENT
Start: 2023-01-31

## 2023-01-31 NOTE — PROGRESS NOTES
Room:8  Identified pt with two pt identifiers(name and ). Reviewed record in preparation for visit and have obtained necessary documentation. Chief Complaint   Patient presents with    Follow-up    Diabetes        Vitals:    23 1426   BP: (!) 154/92   Pulse: 86   Resp: 18   Temp: 97 °F (36.1 °C)   TempSrc: Temporal   SpO2: 98%   Weight: 234 lb (106.1 kg)   Height: 5' 11\" (1.803 m)   PainSc:   0 - No pain       Health Maintenance Due   Topic    Shingles Vaccine (1 of 2)    Eye Exam Retinal or Dilated     Foot Exam Q1     COVID-19 Vaccine (4 - Booster for Deskarma series)    Medicare Yearly Exam     Flu Vaccine (1)    Diabetic Alb to Cr ratio (uACR) test     Lipid Screen     A1C test (Diabetic or Prediabetic)     Depression Screen     GFR test (Diabetes, CKD 3-4, OR last GFR 15-59)        1. \"Have you been to the ER, urgent care clinic since your last visit? Hospitalized since your last visit? \" No    2. \"Have you seen or consulted any other health care providers outside of the [address] [place] [address] since your last visit? \" No     3. For patients over 45: Has the patient had a colonoscopy? No     If the patient is female:    4. For patients over 36: Has the patient had a mammogram? NA - based on age    11. For patients over 21: Has the patient had a pap smear?  NA - based on age    Current Outpatient Medications   Medication Instructions    atorvastatin (LIPITOR) 10 mg tablet TAKE 1 TABLET BY MOUTH EVERY DAY    cephALEXin (KEFLEX) 500 mg, Oral, 3 TIMES DAILY    ciprofloxacin HCl (CIPRO) 500 mg tablet AS NEEDED    colchicine 0.6 mg tablet TAKE 1 TABLET BY MOUTH TWICE A DAY FOR 10 DAYS    doxycycline (VIBRAMYCIN) 100 mg capsule TAKE 1 CAPSULE BY MOUTH EVERY DAY    lisinopril-hydroCHLOROthiazide (PRINZIDE, ZESTORETIC) 10-12.5 mg per tablet 1 Tablet, Oral, DAILY    metFORMIN ER (GLUCOPHAGE XR) 500 mg tablet TAKE 3 TABLETS BY MOUTH EVERY DAY WITH DINNER    sildenafil citrate (VIAGRA) 100 mg, Oral, DAILY AS NEEDED No Known Allergies    Immunization History   Administered Date(s) Administered    COVID-19, J&J, (age 18y+), IM, 0.5mL 03/01/2021, 03/21/2021    COVID-19, MODERNA BLUE border, Primary or Immunocompromised, (age 18y+), IM, 100 mcg/0.5mL 10/25/2021    Influenza Vaccine 11/18/2013    Influenza, FLUAD, (age 72 y+), Adjuvanted 10/21/2021    Influenza, FLUARIX, FLULAVAL, FLUZONE (age 10 mo+) AND AFLURIA, (age 1 y+), PF, 0.5mL 12/02/2014, 12/02/2015, 02/02/2018, 02/13/2019    Pneumococcal Polysaccharide (PPSV-23) 07/15/2013    Tdap 03/22/2013    Zoster Vaccine, Live 06/02/2015       Past Medical History:   Diagnosis Date    Arthralgia of knee 7/15/2013    Diabetes ([facility] [address]     type 2    Diverticular disease of left colon 3/22/2013    GERD (gastroesophageal reflux disease)     Gout 12/6/1985    Hypertension     SLIGHTLY PER PT'S PCP

## 2023-02-01 LAB
ALBUMIN SERPL-MCNC: 4.4 G/DL (ref 3.5–5)
ALBUMIN/GLOB SERPL: 1.2 (ref 1.1–2.2)
ALP SERPL-CCNC: 73 U/L (ref 45–117)
ALT SERPL-CCNC: 41 U/L (ref 12–78)
ANION GAP SERPL CALC-SCNC: 5 MMOL/L (ref 5–15)
AST SERPL-CCNC: 31 U/L (ref 15–37)
BILIRUB SERPL-MCNC: 0.8 MG/DL (ref 0.2–1)
BUN SERPL-MCNC: 15 MG/DL (ref 6–20)
BUN/CREAT SERPL: 12 (ref 12–20)
CALCIUM SERPL-MCNC: 9.4 MG/DL (ref 8.5–10.1)
CHLORIDE SERPL-SCNC: 103 MMOL/L (ref 97–108)
CHOLEST SERPL-MCNC: 134 MG/DL
CO2 SERPL-SCNC: 29 MMOL/L (ref 21–32)
CREAT SERPL-MCNC: 1.27 MG/DL (ref 0.7–1.3)
CREAT UR-MCNC: 59.3 MG/DL
EST. AVERAGE GLUCOSE BLD GHB EST-MCNC: 140 MG/DL
GLOBULIN SER CALC-MCNC: 3.8 G/DL (ref 2–4)
GLUCOSE SERPL-MCNC: 137 MG/DL (ref 65–100)
HBA1C MFR BLD: 6.5 % (ref 4–5.6)
HDLC SERPL-MCNC: 38 MG/DL
HDLC SERPL: 3.5 (ref 0–5)
LDLC SERPL CALC-MCNC: 49 MG/DL (ref 0–100)
MICROALBUMIN UR-MCNC: 1.11 MG/DL
MICROALBUMIN/CREAT UR-RTO: 19 MG/G (ref 0–30)
POTASSIUM SERPL-SCNC: 4.4 MMOL/L (ref 3.5–5.1)
PROT SERPL-MCNC: 8.2 G/DL (ref 6.4–8.2)
SODIUM SERPL-SCNC: 137 MMOL/L (ref 136–145)
TRIGL SERPL-MCNC: 235 MG/DL (ref ?–150)
VLDLC SERPL CALC-MCNC: 47 MG/DL

## 2023-02-01 NOTE — PROGRESS NOTES
Preop form filled out may be early but patient insists we send it in knee replacement in 6 weeks      SUBJECTIVE: Jasen Holt is a 75 y.o. male seen for a follow up visit; he has diabetes, hypertension, hyperlipidemia, and obesity. Current Outpatient Medications   Medication Sig Dispense Refill    lisinopril-hydroCHLOROthiazide (PRINZIDE, ZESTORETIC) 20-25 mg per tablet Take 1 Tablet by mouth daily. 90 Tablet 1    colchicine 0.6 mg tablet TAKE 1 TABLET BY MOUTH TWICE A DAY FOR 10 DAYS 20 Tablet 0    atorvastatin (LIPITOR) 10 mg tablet TAKE 1 TABLET BY MOUTH EVERY DAY 90 Tablet 1    metFORMIN ER (GLUCOPHAGE XR) 500 mg tablet TAKE 3 TABLETS BY MOUTH EVERY DAY WITH DINNER 90 Tablet 0    sildenafil citrate (Viagra) 100 mg tablet Take 1 Tablet by mouth daily as needed for Erectile Dysfunction. 10 Tablet 2     Patient Active Problem List   Diagnosis Code    Gout M10.9    Diverticular disease of left colon K57.30    ED (erectile dysfunction) N52.9    BPH (benign prostatic hyperplasia) N40.0    Diabetes mellitus (HCC) E11.9    Arthralgia of knee M25.569    Asthma J45.909    Dyslipidemia (high LDL; low HDL) E78.5    Lymphadenopathy R59.1    Borderline hypertension R03.0    Laceration of right index finger without foreign body S61.210A    Injury of digital nerve of right index finger S64.490A    Primary localized osteoarthritis of right knee M17.11     System Review: Cardiovascular ROS - taking medications as instructed, no medication side effects noted, patient does not perform home BP monitoring, no TIA's, no chest pain on exertion, no dyspnea on exertion, no swelling of ankles, Diabetic ROS - medication compliance: noncompliant much of the time, diabetic diet compliance: noncompliant some of the time, home glucose monitoring: is not performed. New concerns: weight gain  exercising.      OBJECTIVE:  Visit Vitals  BP (!) 154/92 (BP 1 Location: Left upper arm, BP Patient Position: Sitting, BP Cuff Size: Adult)   Pulse 86 Temp 97 °F (36.1 °C) (Temporal)   Resp 18   Ht 5' 11\" (1.803 m)   Wt 234 lb (106.1 kg)   SpO2 98%   BMI 32.64 kg/m²      Appearance: alert, well appearing, and in no distress and overweight. General exam: CVS exam BP noted to be mildly elevated today in office, S1, S2 normal, no gallop, no murmur, chest clear, no JVD, no HSM, no edema. Lab review: orders written for new lab studies as appropriate; see orders, no lab studies available for review at time of visit. ASSESSMENT:  diabetes borderline controlled, hypertension poorly controlled, hyperlipidemia stable, no significant medication side effects noted. PLAN:  the following changes are made - increase dose ace to 20/25  Requested Prescriptions     Signed Prescriptions Disp Refills    lisinopril-hydroCHLOROthiazide (PRINZIDE, ZESTORETIC) 20-25 mg per tablet 90 Tablet 1     Sig: Take 1 Tablet by mouth daily. Diet weight loss before surgery   exercise and leg strengthening. Diagnoses and all orders for this visit:    1. Type 2 diabetes mellitus without complication, without long-term current use of insulin (HCC)  -     MICROALBUMIN, UR, RAND W/ MICROALB/CREAT RATIO; Future  -     LIPID PANEL; Future  -     METABOLIC PANEL, COMPREHENSIVE; Future  -     HEMOGLOBIN A1C WITH EAG; Future    2. Needs flu shot  -     INFLUENZA, FLUAD, (AGE 65 Y+), IM, PF, 0.5 ML    3. Essential hypertension    4. Dyslipidemia (high LDL; low HDL)    Other orders  -     lisinopril-hydroCHLOROthiazide (PRINZIDE, ZESTORETIC) 20-25 mg per tablet; Take 1 Tablet by mouth daily.

## 2023-03-06 ENCOUNTER — HOSPITAL ENCOUNTER (OUTPATIENT)
Dept: PREADMISSION TESTING | Age: 68
Discharge: HOME | End: 2023-03-06

## 2023-03-06 VITALS
BODY MASS INDEX: 32.72 KG/M2 | TEMPERATURE: 97.2 F | WEIGHT: 233.69 LBS | HEART RATE: 92 BPM | DIASTOLIC BLOOD PRESSURE: 81 MMHG | HEIGHT: 71 IN | SYSTOLIC BLOOD PRESSURE: 134 MMHG

## 2023-03-06 LAB
ABO + RH BLD: NORMAL
APPEARANCE UR: CLEAR
BACTERIA URNS QL MICRO: NEGATIVE /HPF
BILIRUB UR QL: NEGATIVE
BLOOD GROUP ANTIBODIES SERPL: NORMAL
COLOR UR: ABNORMAL
EPITH CASTS URNS QL MICRO: ABNORMAL /LPF
ERYTHROCYTE [DISTWIDTH] IN BLOOD BY AUTOMATED COUNT: 13.9 % (ref 11.5–14.5)
GLUCOSE UR STRIP.AUTO-MCNC: >1000 MG/DL
HCT VFR BLD AUTO: 40.7 % (ref 36.6–50.3)
HGB BLD-MCNC: 14.3 G/DL (ref 12.1–17)
HGB UR QL STRIP: NEGATIVE
HYALINE CASTS URNS QL MICRO: ABNORMAL /LPF (ref 0–5)
INR PPP: 1 (ref 0.9–1.1)
KETONES UR QL STRIP.AUTO: NEGATIVE MG/DL
LEUKOCYTE ESTERASE UR QL STRIP.AUTO: NEGATIVE
MCH RBC QN AUTO: 31 PG (ref 26–34)
MCHC RBC AUTO-ENTMCNC: 35.1 G/DL (ref 30–36.5)
MCV RBC AUTO: 88.1 FL (ref 80–99)
NITRITE UR QL STRIP.AUTO: NEGATIVE
NRBC # BLD: 0 K/UL (ref 0–0.01)
NRBC BLD-RTO: 0 PER 100 WBC
PH UR STRIP: 5 (ref 5–8)
PLATELET # BLD AUTO: 112 K/UL (ref 150–400)
PMV BLD AUTO: 11.8 FL (ref 8.9–12.9)
PROT UR STRIP-MCNC: NEGATIVE MG/DL
PROTHROMBIN TIME: 10 SEC (ref 9–11.1)
RBC # BLD AUTO: 4.62 M/UL (ref 4.1–5.7)
RBC #/AREA URNS HPF: ABNORMAL /HPF (ref 0–5)
SP GR UR REFRACTOMETRY: 1.01 (ref 1–1.03)
SPECIMEN EXP DATE BLD: NORMAL
UA: UC IF INDICATED,UAUC: ABNORMAL
UROBILINOGEN UR QL STRIP.AUTO: 0.2 EU/DL (ref 0.2–1)
WBC # BLD AUTO: 5.6 K/UL (ref 4.1–11.1)
WBC URNS QL MICRO: ABNORMAL /HPF (ref 0–4)

## 2023-03-06 PROCEDURE — 85027 COMPLETE CBC AUTOMATED: CPT

## 2023-03-06 PROCEDURE — 81001 URINALYSIS AUTO W/SCOPE: CPT

## 2023-03-06 PROCEDURE — 86900 BLOOD TYPING SEROLOGIC ABO: CPT

## 2023-03-06 PROCEDURE — 85610 PROTHROMBIN TIME: CPT

## 2023-03-06 PROCEDURE — 93005 ELECTROCARDIOGRAM TRACING: CPT

## 2023-03-06 PROCEDURE — 36415 COLL VENOUS BLD VENIPUNCTURE: CPT

## 2023-03-06 NOTE — PERIOP NOTES
[address] INSTRUCTIONS  ORTHOPAEDIC    Surgery Date:   3/14/23    Your surgeon's office or [facility] staff will call you between 4 PM- 8 PM the day before surgery with your arrival time. If your surgery is on a Monday, you will receive a call the preceding Friday. Please report to [facility] Patient Access/Admitting on the 1st floor. Bring your insurance card, photo identification, and any copayment (if applicable). If you are going home the same day of your surgery, you must have a responsible adult to drive you home. You need to have a responsible adult to stay with you the first 24 hours after surgery and you should not drive a car for 24 hours following your surgery. Do NOT eat any solid foods after midnight the night before surgery including candy, mints or gum. You may drink clear liquids from midnight until 1 hour prior to arrival time. You may drink up to 12 ounces at one time every 4 hours. Do NOT drink alcohol or smoke 24 hours before surgery. STOP smoking for 14 days prior as it helps with breathing and healing after surgery. If your arrival time is 3pm or later, you may eat a light breakfast before 8am (toast, bagel-no butter, black coffee, plain tea, fruit juice-no pulp) Please note special instructions, if applicable, below for medications. If you are being admitted to the hospital,please leave personal belongings/luggage in your car until you have an assigned hospital room number. Please wear comfortable clothes. Wear your glasses instead of contacts. We ask that all money, jewelry and valuables be left at home. Wear no make up, particularly mascara, the day of surgery. All body piercings, rings, and jewelry need to be removed and left at home. Please remove any nail polish or artificial nails from your fingernails. Please wear your hair loose or down. Please no pony-tails, buns, or any metal hair accessories.  If you shower the morning of surgery, please do not apply any lotions or powders afterwards. You may wear deodorant. Do not shave any body area within 24 hours of your surgery. Please follow all instructions to avoid any potential surgical cancellation. Should your physical condition change, (i.e. fever, cold, flu, etc.) please notify your surgeon as soon as possible. It is important to be on time. If a situation occurs where you may be delayed, please call:  [phone] / [phone] on the day of surgery. The Preadmission Testing staff can be reached at [phone]. Special instructions: NONE    Current Outpatient Medications   Medication Sig    lisinopril-hydroCHLOROthiazide (PRINZIDE, ZESTORETIC) 20-25 mg per tablet Take 1 Tablet by mouth daily. colchicine 0.6 mg tablet TAKE 1 TABLET BY MOUTH TWICE A DAY FOR 10 DAYS (Patient taking differently: Take 0.6 mg by mouth as needed.)    atorvastatin (LIPITOR) 10 mg tablet TAKE 1 TABLET BY MOUTH EVERY DAY (Patient taking differently: Take 10 mg by mouth every evening. TAKE 1 TABLET BY MOUTH EVERY DAY)    metFORMIN ER (GLUCOPHAGE XR) 500 mg tablet TAKE 3 TABLETS BY MOUTH EVERY DAY WITH DINNER (Patient taking differently: Take 500 mg by mouth two (2) times a day. TAKE 3 TABLETS BY MOUTH EVERY DAY WITH DINNER)    sildenafil citrate (Viagra) 100 mg tablet Take 1 Tablet by mouth daily as needed for Erectile Dysfunction. No current facility-administered medications for this encounter.        YOU MUST ONLY TAKE THESE MEDICATIONS THE MORNING OF SURGERY WITH A SIP OF WATER: NONE    MEDICATIONS TO TAKE THE MORNING OF SURGERY ONLY IF NEEDED: COLCHICINE    HOLD these prescription medications BEFORE Surgery: METFORMIN AND SILDENAFIL FOR 24HRS PRIOR TO SURGERY (DO NOT TAKE ON 3/13/23 OR THE MORNING OF SURGERY)    Ask your surgeon/prescribing physician about when/if to STOP taking these medications: NONE    Stop any non-steroidal anti-inflammatory drugs (i.e. Ibuprofen, Naproxen, Advil, Aleve) 3 days before surgery. You may take Tylenol. STOP all vitamins and herbal supplements 1 week prior to  surgery. If you are currently taking Plavix, Coumadin, or any other blood-thinning/anticoagulant medication contact your prescribing physician for instructions. Preventing Infections Before and After - Your Surgery    IMPORTANT INSTRUCTIONS    You play an important role in your health and preparation for surgery. To reduce the germs on your skin you will need to shower with CHG soap (Chorhexidine gluconate 4%) two times before surgery. CHG soap (Hibiclens, Hex-A-Clens or store brand)  CHG soap will be provided at your Preadmission Testing (PAT) appointment. If you do not have a PAT appointment before surgery, you may arrange to  CHG soap from our office or purchase CHG soap at a pharmacy, grocery or department store. You need to purchase TWO 4 ounce bottles to use for your 2 showers. Steps to follow:  [place] your hair with your normal shampoo and your body with regular soap and rinse well to remove shampoo and soap from your skin. Wet a clean washcloth and turn off the shower. Put CHG soap on washcloth and apply to your entire body from the neck down. Do not use on your head, face or private parts(genitals). Do not use CHG soap on open sores, wounds or areas of skin irritation. Wash you body gently for 5 minutes. Do not wash your skin too hard. This soap does not create lather. Pay special attention to your underarms and from your belly button to your feet. Turn the shower back on and rinse well to get CHG soap off your body. Pat your skin dry with a clean, dry towel. Do not apply lotions or moisturizer. Put on clean clothes and sleep on fresh bed sheets and do not allow pets to sleep with you.     Shower with CHG soap 2 times before your surgery  The evening before your surgery  The morning of your surgery      Tips to help prevent infections after your surgery:  Protect your surgical wound from germs:  Hand washing is the most important thing you and your caregivers can do to prevent infections. Keep your bandage clean and dry! Do not touch your surgical wound. Use clean, freshly washed towels and washcloths every time you shower; do not share bath linens with others. Until your surgical wound is healed, wear clothing and sleep on bed linens each day that are clean and freshly washed. Do not allow pets to sleep in your bed with you or touch your surgical wound. Do not smoke - smoking delays wound healing. This may be a good time to stop smoking. If you have diabetes, it is important for you to manage your blood sugar levels properly before your surgery as well as after your surgery. Poorly managed blood sugar levels slow down wound healing and prevent you from healing completely. Prevention of Infection  Testing for Staphylococcus aureus on your skin before surgery    Staphylococcus aureus (staph) is a common bacteria that is found on the body. It normally does not cause infection on healthy skin. Before surgery, you will be tested to see if you have staph by swabbing the inside of your nose. When you have an incision with surgery, the goal is to protect that incision from infection. Removal of the staph bacteria before surgery can decrease the risk of a surgical site infection. If your nose swab is positive for staph you will be called. Your treatment will include 2 steps:  Prescription for Mupirocin ointment to be used in each nostril twice a day for 5 days. Showering with Chlorhexidine (CHG) liquid soap for 5 days prior to surgery. How to use Mupirocin ointment in your nose   the prescription from your pharmacy. You will receive a large tube of ointment which will be big enough for all of your treatments. You will apply this ointment to each nostril 2 times a day for 5 days. Wash your hands with  gel or soap and water for 20 seconds before using ointment.   Place a pea-sized amount of ointment on a cotton Q-tip. Apply ointment just inside of each nostril with the Q-tip. Do not push Q-tip or ointment deep inside you nose. Press your nostrils together and massage for a few seconds. Wash your hands with  gel or soap and water after you are finished. Do not get ointment near your eyes. If it gets into your eyes, rinse them with cool water. If you need to use nasal spray, clean the tip of the bottle with alcohol before use and do not use both at the same time. If you are scheduled for COVID testing during the 5 days, do NOT apply morning dose until after the COVID test has been performed. How to use Chlorhexidine (CHG) 4% liquid soap  Purchase an 8 ounce bottle of CHG liquid soap (Chlorhexidine 4%, Hibiclens, Hex-A-Clens or store brand) at a pharmacy or grocery store. Wash your hair with your normal shampoo and your body with regular soap and rinse well to remove shampoo and soap from your skin. Wet a clean washcloth and turn off the shower. Put CHG soap on washcloth and apply to your entire body from the neck down. Do not use on your head, face or private parts(genitals). Do not use CHG soap on open sores, wounds or areas of skin irritation. Wash your body gently for 5 minutes. Do not wash your skin too hard. This soap does not create lather. Pay special attention to your underarms and from your belly button to your feet. Turn the shower back on and rinse well to get CHG soap off your body. Pat your skin dry with a clean, dry towel. Do not apply lotions or moisturizer. Put on clean clothes and sleep on fresh bed sheets the night before surgery. Do not allow pets to sleep with you. Eating and Drinking Before Surgery    You may eat a regular dinner at the usual time on the day before your surgery. Do NOT eat any solid foods after midnight unless your arrival time at the hospital is 3pm or later.   You may drink clear liquids only from 12 midnight until 1 hours prior to your arrival time at the hospital on the day of your surgery. Do NOT drink alcohol. Clear liquids include:  Water  Fruit juices without pulp( i.e. apple juice)  Carbonated beverages  Black coffee (no cream/milk)  Tea (no cream/milk)  Gatorade  You may drink up to 12-16 ounces at one time every 4 hours between the hours of midnight and 1 hour before your arrival time at the hospital. Example- if your arrival time at the hospital is 6am, you may drink 12-16 ounces of clear liquids no later than 5am.  If your arrival time at the hospital is 3pm or later, you may eat a light breakfast before 8am.  A light breakfast includes: Toast or bagel (no butter)  Black coffee (no cream/milk)  Tea (no cream/milk)  Fruit juices without pulp ( i.e. apple juice)  Do NOT eat meat, eggs, vegetables or fruit  If you have any questions, please contact your surgeon's office. Patient Information Regarding COVID Restrictions    Day of Procedure    Please park in the parking deck or any designated visitor parking lot. Enter the facility through the Main Entrance of the hospital.  On the day of surgery, please provide the cell phone number of the person who will be waiting for you to the Patient Access representative at the time of registration. Masks are highly recommended in the hospital, but not required. Once your procedure and the immediate recovery period is completed, a nurse in the recovery area will contact your designated visitor to inform them of your room number or to otherwise review other pertinent information regarding your care. Social distancing practices are strongly encouraged in waiting areas and the cafeteria. The patient was contacted in person. He verbalized understanding of all instructions does not  need reinforcement.

## 2023-03-07 LAB
ATRIAL RATE: 87 BPM
BACTERIA SPEC CULT: NORMAL
BACTERIA SPEC CULT: NORMAL
CALCULATED P AXIS, ECG09: 49 DEGREES
CALCULATED R AXIS, ECG10: -49 DEGREES
CALCULATED T AXIS, ECG11: 76 DEGREES
DIAGNOSIS, 93000: NORMAL
P-R INTERVAL, ECG05: 186 MS
Q-T INTERVAL, ECG07: 372 MS
QRS DURATION, ECG06: 144 MS
QTC CALCULATION (BEZET), ECG08: 447 MS
SERVICE CMNT-IMP: NORMAL
VENTRICULAR RATE, ECG03: 87 BPM

## 2023-03-07 NOTE — PERIOP NOTES
PAT Nurse Practitioner   Pre-Operative Chart Review/Assessment:-ORTHOPEDIC                Patient Name:  Sanjay Ramsay                                                           Age:   75 y.o.    :  1955     Today's Date:  3/8/2023     Date of PAT:   3/6/23      Date of Surgery:    3/14/23      Procedure(s):  Left Total Knee Arthroplasty     Surgeon:   Dr. Raymundo Palacios:      1)  Medical Clearance/PCP:  Meg Pena MD      2)  Cardiac Clearance:  EKG and METs reviewed. No further cardiac evaluation requested. PAT EKG showed normal sinus rhythm, LAD, and incomplete LBBB. 3)  Diabetic Treatment Consult:  Not indicated. A1c-6.5      4)  Sleep Apnea evaluation:   YAA score of 4. Pt reports loud snoring that can be heard through a closed door and a diagnosis of HTN. Pt denies daytime fatigue and witnessed pauses in breathing. Pt declined referral to sleep medicine at this time. 5) Treatment for MRSA/Staph Aureus:  Neg      6) Additional Concerns:  HTN, GERD, T2DM                Vital Signs:         Vitals:    23 0936   BP: 134/81   Pulse: 92   Temp: 97.2 °F (36.2 °C)   Weight: 106 kg (233 lb 11 oz)   Height: 5' 11\" (1.803 m)          Body mass index is 32.59 kg/m².         ____________________________________________  PAST MEDICAL HISTORY  Past Medical History:   Diagnosis Date    Arthralgia of knee 07/15/2013    Arthritis     Diabetes ([address]     type 2    Diverticular disease of left colon 2013    GERD (gastroesophageal reflux disease)     Gout 1985    Hypertension     SLIGHTLY PER PT'S PCP      ____________________________________________  PAST SURGICAL HISTORY  Past Surgical History:   Procedure Laterality Date    HX COLONOSCOPY      Dr. Mario Fisher- [place]    HX HEENT      deviated septum age 12    HX KNEE REPLACEMENT Right 02/10/2022    HX ORTHOPAEDIC  1974    REPAIR OF CARTILAGE IN RT KNEE JOINT.     HX OTHER SURGICAL  2016    excision of lymph node right axilla    HX TONSILLECTOMY      child      ____________________________________________  HOME MEDICATIONS  Current Outpatient Medications   Medication Sig    lisinopril-hydroCHLOROthiazide (PRINZIDE, ZESTORETIC) 20-25 mg per tablet Take 1 Tablet by mouth daily. colchicine 0.6 mg tablet TAKE 1 TABLET BY MOUTH TWICE A DAY FOR 10 DAYS (Patient taking differently: Take 0.6 mg by mouth as needed.)    atorvastatin (LIPITOR) 10 mg tablet TAKE 1 TABLET BY MOUTH EVERY DAY (Patient taking differently: Take 10 mg by mouth every evening. TAKE 1 TABLET BY MOUTH EVERY DAY)    metFORMIN ER (GLUCOPHAGE XR) 500 mg tablet TAKE 3 TABLETS BY MOUTH EVERY DAY WITH DINNER (Patient taking differently: Take 500 mg by mouth two (2) times a day. TAKE 3 TABLETS BY MOUTH EVERY DAY WITH DINNER)    sildenafil citrate (Viagra) 100 mg tablet Take 1 Tablet by mouth daily as needed for Erectile Dysfunction. No current facility-administered medications for this encounter.      ____________________________________________  ALLERGIES  No Known Allergies   ____________________________________________  SOCIAL HISTORY  Social History     Tobacco Use    Smoking status: Never    Smokeless tobacco: Never   Substance Use Topics    Alcohol use:  Yes     Alcohol/week: 1.0 standard drink     Types: 1 Drinks containing 0.5 oz of alcohol per week     Comment: VERY RARE      ____________________________________________   Internal Administration   First Dose COVID-19, J&J, (age 18y+), IM, 0.5mL  03/01/2021   Second Dose COVID-19, J&J, (age 18y+), IM, 0.5mL  03/21/2021      Last COVID Lab SARS-CoV-2 ( )   Date Value   02/07/2022 Not detected   07/15/2020 Not Detected                    Labs:     Hospital Outpatient Visit on 03/06/2023   Component Date Value Ref Range Status    WBC 03/06/2023 5.6  4.1 - 11.1 K/uL Final    RBC 03/06/2023 4.62  4.10 - 5.70 M/uL Final    HGB 03/06/2023 14.3  12.1 - 17.0 g/dL Final    HCT 03/06/2023 40.7  36.6 - 50.3 % Final MCV 03/06/2023 88.1  80.0 - 99.0 FL Final    MCH 03/06/2023 31.0  26.0 - 34.0 PG Final    MCHC 03/06/2023 35.1  30.0 - 36.5 g/dL Final    RDW 03/06/2023 13.9  11.5 - 14.5 % Final    PLATELET 95/89/4573 603 (A)  150 - 400 K/uL Final    MPV 03/06/2023 11.8  8.9 - 12.9 FL Final    NRBC 03/06/2023 0.0  0  WBC Final    ABSOLUTE NRBC 03/06/2023 0.00  0.00 - 0.01 K/uL Final    Crossmatch Expiration 03/06/2023 03/17/2023,2359   Final    ABO/Rh(D) 03/06/2023 B POSITIVE   Final    Antibody screen 03/06/2023 NEG   Final    INR 03/06/2023 1.0  0.9 - 1.1   Final    A single therapeutic range for Vit K antagonists may not be optimal for all indications - see June, 2008 issue of Chest, American College of Chest Physicians Evidence-Based Clinical Practice Guidelines, 8th Edition. Prothrombin time 03/06/2023 10.0  9.0 - 11.1 sec Final    Color 03/06/2023 YELLOW/STRAW    Final    Color Reference Range: Straw, Yellow or Dark Yellow    Appearance 03/06/2023 CLEAR  CLEAR   Final    Specific gravity 03/06/2023 1.014  1.003 - 1.030   Final    pH (UA) 03/06/2023 5.0  5.0 - 8.0   Final    Protein 03/06/2023 Negative  NEG mg/dL Final    Glucose 03/06/2023 >1,000 (A)  NEG mg/dL Final    Ketone 03/06/2023 Negative  NEG mg/dL Final    Bilirubin 03/06/2023 Negative  NEG   Final    Blood 03/06/2023 Negative  NEG   Final    Urobilinogen 03/06/2023 0.2  0.2 - 1.0 EU/dL Final    Nitrites 03/06/2023 Negative  NEG   Final    Leukocyte Esterase 03/06/2023 Negative  NEG   Final    UA:UC IF INDICATED 03/06/2023 CULTURE NOT INDICATED BY UA RESULT  CNI   Final    WBC 03/06/2023 0-4  0 - 4 /hpf Final    RBC 03/06/2023 0-5  0 - 5 /hpf Final    Epithelial cells 03/06/2023 FEW  FEW /lpf Final    Epithelial cell category consists of squamous cells and /or transitional urothelial cells. Renal tubular cells, if present, are separately identified as such.     Bacteria 03/06/2023 Negative  NEG /hpf Final    Hyaline cast 03/06/2023 0-2  0 - 5 /lpf Final Ventricular Rate 03/06/2023 87  BPM Final    Atrial Rate 03/06/2023 87  BPM Final    P-R Interval 03/06/2023 186  ms Final    QRS Duration 03/06/2023 144  ms Final    Q-T Interval 03/06/2023 372  ms Final    QTC Calculation (Bezet) 03/06/2023 447  ms Final    Calculated P Axis 03/06/2023 49  degrees Final    Calculated R Axis 03/06/2023 -49  degrees Final    Calculated T Axis 03/06/2023 76  degrees Final    Diagnosis 03/06/2023    Final                    Value:Normal sinus rhythm  Left axis deviation  Incomplete left bundle branch block    When compared with ECG of 13-JAN-2022 09:40,  QRS duration has increased  Confirmed by Katharine Gamboa M.D., Yaneli Zhong ([id number]) on 3/7/2023 10:39:56 AM      Special Requests: 03/06/2023 NO SPECIAL REQUESTS    Final    Culture result: 03/06/2023 MRSA NOT PRESENT    Final    Culture result: 03/06/2023     Final                    Value:Screening of patient nares for MRSA is for surveillance purposes and, if positive, to facilitate isolation considerations in high risk settings. It is not intended for automatic decolonization interventions per se as regimens are not sufficiently effective to warrant routine use.      Orders Only on 01/31/2023   Component Date Value Ref Range Status    Hemoglobin A1c 01/31/2023 6.5 (A)  4.0 - 5.6 % Final    Comment: NEW METHOD  PLEASE NOTE NEW REFERENCE RANGE  (NOTE)  HbA1C Interpretive Ranges  <5.7              Normal  5.7 - 6.4         Consider Prediabetes  >6.5              Consider Diabetes      Est. average glucose 01/31/2023 140  mg/dL Final    Sodium 01/31/2023 137  136 - 145 mmol/L Final    Potassium 01/31/2023 4.4  3.5 - 5.1 mmol/L Final    Chloride 01/31/2023 103  97 - 108 mmol/L Final    CO2 01/31/2023 29  21 - 32 mmol/L Final    Anion gap 01/31/2023 5  5 - 15 mmol/L Final    Glucose 01/31/2023 137 (A)  65 - 100 mg/dL Final    BUN 01/31/2023 15  6 - 20 MG/DL Final    Creatinine 01/31/2023 1.27  0.70 - 1.30 MG/DL Final    BUN/Creatinine ratio 01/31/2023 12 12 - 20   Final    eGFR 01/31/2023 >60  >60 ml/min/1.73m2 Final    Comment:   Pediatric calculator link: Dottie.at. org/professionals/kdoqi/gfr_calculatorped    These results are not intended for use in patients <25years of age. eGFR results are calculated without a race factor using  the 2021 CKD-EPI equation. Careful clinical correlation is recommended, particularly when comparing to results calculated using previous equations. The CKD-EPI equation is less accurate in patients with extremes of muscle mass, extra-renal metabolism of creatinine, excessive creatine ingestion, or following therapy that affects renal tubular secretion. Calcium 01/31/2023 9.4  8.5 - 10.1 MG/DL Final    Bilirubin, total 01/31/2023 0.8  0.2 - 1.0 MG/DL Final    ALT (SGPT) 01/31/2023 41  12 - 78 U/L Final    AST (SGOT) 01/31/2023 31  15 - 37 U/L Final    Alk. phosphatase 01/31/2023 73  45 - 117 U/L Final    Protein, total 01/31/2023 8.2  6.4 - 8.2 g/dL Final    Albumin 01/31/2023 4.4  3.5 - 5.0 g/dL Final    Globulin 01/31/2023 3.8  2.0 - 4.0 g/dL Final    A-G Ratio 01/31/2023 1.2  1.1 - 2.2   Final    Cholesterol, total 01/31/2023 134  <200 MG/DL Final    Triglyceride 01/31/2023 235 (A)  <150 MG/DL Final    Based on NCEP-ATP III:  Triglycerides <150 mg/dL  is considered normal, 150-199 mg/dL  borderline high,  200-499 mg/dL high and  greater than or equal to 500 mg/dL very high. HDL Cholesterol 01/31/2023 38  MG/DL Final    Based on NCEP ATP III, HDL Cholesterol <40 mg/dL is considered low and >60 mg/dL is elevated.     LDL, calculated 01/31/2023 49  0 - 100 MG/DL Final    Comment: Based on the NCEP-ATP: LDL-C concentrations are considered  optimal <100 mg/dL,  near optimal/above Normal 100-129 mg/dL  Borderline High: 130-159, High: 160-189 mg/dL  Very High: Greater than or equal to 190 mg/dL      VLDL, calculated 01/31/2023 47  MG/DL Final    CHOL/HDL Ratio 01/31/2023 3.5  0.0 - 5.0   Final    Microalbumin,urine random 01/31/2023 1.11  MG/DL Final    No reference range has been established. Creatinine, urine random 01/31/2023 59.30  mg/dL Final    No reference range has been established. Microalbumin/Creat ratio (mg/g cre* 01/31/2023 19  0 - 30 mg/g Final       Skin:     Denies open wounds, cuts, sores, rashes or other areas of concern in PAT assessment.           Joana Noonan, NP  Available via [facility]

## 2023-03-13 ENCOUNTER — ANESTHESIA EVENT (OUTPATIENT)
Dept: SURGERY | Age: 68
End: 2023-03-13
Payer: MEDICARE

## 2023-03-14 ENCOUNTER — ANESTHESIA (OUTPATIENT)
Dept: SURGERY | Age: 68
End: 2023-03-14
Payer: MEDICARE

## 2023-03-14 ENCOUNTER — HOSPITAL ENCOUNTER (OUTPATIENT)
Age: 68
Setting detail: OBSERVATION
Discharge: HOME HEALTH | End: 2023-03-15
Attending: ORTHOPAEDIC SURGERY | Admitting: ORTHOPAEDIC SURGERY
Payer: MEDICARE

## 2023-03-14 DIAGNOSIS — M17.12 PRIMARY OSTEOARTHRITIS OF LEFT KNEE: Primary | ICD-10-CM

## 2023-03-14 LAB
GLUCOSE BLD STRIP.AUTO-MCNC: 193 MG/DL (ref 65–117)
GLUCOSE BLD STRIP.AUTO-MCNC: 244 MG/DL (ref 65–117)
SERVICE CMNT-IMP: ABNORMAL
SERVICE CMNT-IMP: ABNORMAL

## 2023-03-14 PROCEDURE — 77030003601 HC NDL NRV BLK BBMI -A

## 2023-03-14 PROCEDURE — 74011250636 HC RX REV CODE- 250/636: Performed by: ANESTHESIOLOGY

## 2023-03-14 PROCEDURE — 74011000250 HC RX REV CODE- 250: Performed by: PHYSICIAN ASSISTANT

## 2023-03-14 PROCEDURE — 74011000250 HC RX REV CODE- 250: Performed by: NURSE ANESTHETIST, CERTIFIED REGISTERED

## 2023-03-14 PROCEDURE — 82962 GLUCOSE BLOOD TEST: CPT

## 2023-03-14 PROCEDURE — 77030010507 HC ADH SKN DERMBND J&J -B: Performed by: ORTHOPAEDIC SURGERY

## 2023-03-14 PROCEDURE — 76210000006 HC OR PH I REC 0.5 TO 1 HR: Performed by: ORTHOPAEDIC SURGERY

## 2023-03-14 PROCEDURE — 76010000172 HC OR TIME 2.5 TO 3 HR INTENSV-TIER 1: Performed by: ORTHOPAEDIC SURGERY

## 2023-03-14 PROCEDURE — 74011250636 HC RX REV CODE- 250/636: Performed by: NURSE ANESTHETIST, CERTIFIED REGISTERED

## 2023-03-14 PROCEDURE — 2709999900 HC NON-CHARGEABLE SUPPLY: Performed by: ORTHOPAEDIC SURGERY

## 2023-03-14 PROCEDURE — G0378 HOSPITAL OBSERVATION PER HR: HCPCS

## 2023-03-14 PROCEDURE — 97116 GAIT TRAINING THERAPY: CPT

## 2023-03-14 PROCEDURE — 74011250637 HC RX REV CODE- 250/637: Performed by: PHYSICIAN ASSISTANT

## 2023-03-14 PROCEDURE — 77030006835 HC BLD SAW SAG STRY -B: Performed by: ORTHOPAEDIC SURGERY

## 2023-03-14 PROCEDURE — 77030040750 HC INSTR NAV SYS DISP ORLN -G: Performed by: ORTHOPAEDIC SURGERY

## 2023-03-14 PROCEDURE — 77030031139 HC SUT VCRL2 J&J -A: Performed by: ORTHOPAEDIC SURGERY

## 2023-03-14 PROCEDURE — 74011250636 HC RX REV CODE- 250/636: Performed by: PHYSICIAN ASSISTANT

## 2023-03-14 PROCEDURE — 74011636637 HC RX REV CODE- 636/637: Performed by: PHYSICIAN ASSISTANT

## 2023-03-14 PROCEDURE — 77030040922 HC BLNKT HYPOTHRM STRY -A

## 2023-03-14 PROCEDURE — 77030039497 HC CST PAD STERILE CHCS -A: Performed by: ORTHOPAEDIC SURGERY

## 2023-03-14 PROCEDURE — 77030000032 HC CUF TRNQT ZIMM -B: Performed by: ORTHOPAEDIC SURGERY

## 2023-03-14 PROCEDURE — 77030007866 HC KT SPN ANES BBMI -B: Performed by: ANESTHESIOLOGY

## 2023-03-14 PROCEDURE — C1776 JOINT DEVICE (IMPLANTABLE): HCPCS | Performed by: ORTHOPAEDIC SURGERY

## 2023-03-14 PROCEDURE — 77030033067 HC SUT PDO STRATFX SPIR J&J -B: Performed by: ORTHOPAEDIC SURGERY

## 2023-03-14 PROCEDURE — 97530 THERAPEUTIC ACTIVITIES: CPT

## 2023-03-14 PROCEDURE — 74011000258 HC RX REV CODE- 258: Performed by: NURSE ANESTHETIST, CERTIFIED REGISTERED

## 2023-03-14 PROCEDURE — 77030002933 HC SUT MCRYL J&J -A: Performed by: ORTHOPAEDIC SURGERY

## 2023-03-14 PROCEDURE — C1713 ANCHOR/SCREW BN/BN,TIS/BN: HCPCS | Performed by: ORTHOPAEDIC SURGERY

## 2023-03-14 PROCEDURE — 77030019905 HC CATH URETH INTMIT MDII -A: Performed by: ORTHOPAEDIC SURGERY

## 2023-03-14 PROCEDURE — 76060000036 HC ANESTHESIA 2.5 TO 3 HR: Performed by: ORTHOPAEDIC SURGERY

## 2023-03-14 PROCEDURE — 97161 PT EVAL LOW COMPLEX 20 MIN: CPT

## 2023-03-14 DEVICE — DOMED TRI-PEG PATELLA, 35X9MM, E-PLUS
Type: IMPLANTABLE DEVICE | Site: KNEE | Status: FUNCTIONAL
Brand: DJO SURGICAL

## 2023-03-14 DEVICE — IMPL CAPPED KNEE K1 TOTAL/HEMI STD CEMENTED DJO: Type: IMPLANTABLE DEVICE | Status: FUNCTIONAL

## 2023-03-14 DEVICE — EMPOWR 3D KNEETM FEMUR, NP, 9L
Type: IMPLANTABLE DEVICE | Site: KNEE | Status: FUNCTIONAL
Brand: DJO SURGICAL

## 2023-03-14 DEVICE — EMPOWR 3D KNEETM INS, 10L 14MM, VE
Type: IMPLANTABLE DEVICE | Site: KNEE | Status: FUNCTIONAL
Brand: DJO SURGICAL

## 2023-03-14 DEVICE — DJO EMPOWR KNEETM, FIN BP, NP, 10L
Type: IMPLANTABLE DEVICE | Site: KNEE | Status: FUNCTIONAL
Brand: DJO SURGICAL

## 2023-03-14 DEVICE — CEMENT BNE MED VISC 80 GM GENTAMICIN PALACOS MV+G PRO: Type: IMPLANTABLE DEVICE | Site: KNEE | Status: FUNCTIONAL

## 2023-03-14 RX ORDER — HYDROMORPHONE HYDROCHLORIDE 1 MG/ML
0.5 INJECTION, SOLUTION INTRAMUSCULAR; INTRAVENOUS; SUBCUTANEOUS
Status: DISCONTINUED | OUTPATIENT
Start: 2023-03-14 | End: 2023-03-15 | Stop reason: HOSPADM

## 2023-03-14 RX ORDER — ASPIRIN 81 MG/1
81 TABLET ORAL 2 TIMES DAILY
Qty: 60 TABLET | Refills: 0 | Status: SHIPPED | OUTPATIENT
Start: 2023-03-14

## 2023-03-14 RX ORDER — PROPOFOL 10 MG/ML
INJECTION, EMULSION INTRAVENOUS AS NEEDED
Status: DISCONTINUED | OUTPATIENT
Start: 2023-03-14 | End: 2023-03-14 | Stop reason: HOSPADM

## 2023-03-14 RX ORDER — SODIUM CHLORIDE 0.9 % (FLUSH) 0.9 %
5-40 SYRINGE (ML) INJECTION AS NEEDED
Status: DISCONTINUED | OUTPATIENT
Start: 2023-03-14 | End: 2023-03-15 | Stop reason: HOSPADM

## 2023-03-14 RX ORDER — ONDANSETRON 2 MG/ML
INJECTION INTRAMUSCULAR; INTRAVENOUS AS NEEDED
Status: DISCONTINUED | OUTPATIENT
Start: 2023-03-14 | End: 2023-03-14 | Stop reason: HOSPADM

## 2023-03-14 RX ORDER — SODIUM CHLORIDE 9 MG/ML
75 INJECTION, SOLUTION INTRAVENOUS CONTINUOUS
Status: DISCONTINUED | OUTPATIENT
Start: 2023-03-14 | End: 2023-03-15 | Stop reason: HOSPADM

## 2023-03-14 RX ORDER — CELECOXIB 200 MG/1
200 CAPSULE ORAL ONCE
Status: COMPLETED | OUTPATIENT
Start: 2023-03-14 | End: 2023-03-14

## 2023-03-14 RX ORDER — PROPOFOL 10 MG/ML
INJECTION, EMULSION INTRAVENOUS
Status: DISCONTINUED | OUTPATIENT
Start: 2023-03-14 | End: 2023-03-14 | Stop reason: HOSPADM

## 2023-03-14 RX ORDER — SODIUM CHLORIDE 0.9 % (FLUSH) 0.9 %
5-40 SYRINGE (ML) INJECTION AS NEEDED
Status: DISCONTINUED | OUTPATIENT
Start: 2023-03-14 | End: 2023-03-14 | Stop reason: HOSPADM

## 2023-03-14 RX ORDER — KETOROLAC TROMETHAMINE 30 MG/ML
15 INJECTION, SOLUTION INTRAMUSCULAR; INTRAVENOUS EVERY 6 HOURS
Status: DISCONTINUED | OUTPATIENT
Start: 2023-03-14 | End: 2023-03-15

## 2023-03-14 RX ORDER — POLYETHYLENE GLYCOL 3350 17 G/17G
17 POWDER, FOR SOLUTION ORAL DAILY
Status: DISCONTINUED | OUTPATIENT
Start: 2023-03-15 | End: 2023-03-15 | Stop reason: HOSPADM

## 2023-03-14 RX ORDER — DEXMEDETOMIDINE HYDROCHLORIDE 100 UG/ML
INJECTION, SOLUTION INTRAVENOUS AS NEEDED
Status: DISCONTINUED | OUTPATIENT
Start: 2023-03-14 | End: 2023-03-14 | Stop reason: HOSPADM

## 2023-03-14 RX ORDER — ONDANSETRON 2 MG/ML
4 INJECTION INTRAMUSCULAR; INTRAVENOUS
Status: DISCONTINUED | OUTPATIENT
Start: 2023-03-14 | End: 2023-03-15 | Stop reason: HOSPADM

## 2023-03-14 RX ORDER — ROPIVACAINE HYDROCHLORIDE 5 MG/ML
INJECTION, SOLUTION EPIDURAL; INFILTRATION; PERINEURAL
Status: COMPLETED | OUTPATIENT
Start: 2023-03-14 | End: 2023-03-14

## 2023-03-14 RX ORDER — ATORVASTATIN CALCIUM 10 MG/1
10 TABLET, FILM COATED ORAL EVERY EVENING
Status: DISCONTINUED | OUTPATIENT
Start: 2023-03-14 | End: 2023-03-15 | Stop reason: HOSPADM

## 2023-03-14 RX ORDER — SODIUM CHLORIDE 0.9 % (FLUSH) 0.9 %
5-40 SYRINGE (ML) INJECTION EVERY 8 HOURS
Status: DISCONTINUED | OUTPATIENT
Start: 2023-03-14 | End: 2023-03-14 | Stop reason: HOSPADM

## 2023-03-14 RX ORDER — ONDANSETRON 2 MG/ML
4 INJECTION INTRAMUSCULAR; INTRAVENOUS AS NEEDED
Status: DISCONTINUED | OUTPATIENT
Start: 2023-03-14 | End: 2023-03-14 | Stop reason: HOSPADM

## 2023-03-14 RX ORDER — SODIUM CHLORIDE 9 MG/ML
INJECTION, SOLUTION INTRAVENOUS
Status: DISCONTINUED | OUTPATIENT
Start: 2023-03-14 | End: 2023-03-14 | Stop reason: HOSPADM

## 2023-03-14 RX ORDER — SODIUM CHLORIDE 0.9 % (FLUSH) 0.9 %
5-40 SYRINGE (ML) INJECTION EVERY 8 HOURS
Status: DISCONTINUED | OUTPATIENT
Start: 2023-03-14 | End: 2023-03-15 | Stop reason: HOSPADM

## 2023-03-14 RX ORDER — AMOXICILLIN 250 MG
1 CAPSULE ORAL
Qty: 60 TABLET | Refills: 0 | Status: SHIPPED | OUTPATIENT
Start: 2023-03-14

## 2023-03-14 RX ORDER — PHENYLEPHRINE HCL IN 0.9% NACL 0.4MG/10ML
SYRINGE (ML) INTRAVENOUS AS NEEDED
Status: DISCONTINUED | OUTPATIENT
Start: 2023-03-14 | End: 2023-03-14 | Stop reason: HOSPADM

## 2023-03-14 RX ORDER — MIDAZOLAM HYDROCHLORIDE 1 MG/ML
1 INJECTION, SOLUTION INTRAMUSCULAR; INTRAVENOUS AS NEEDED
Status: DISCONTINUED | OUTPATIENT
Start: 2023-03-14 | End: 2023-03-14 | Stop reason: HOSPADM

## 2023-03-14 RX ORDER — IBUPROFEN 200 MG
4 TABLET ORAL AS NEEDED
Status: DISCONTINUED | OUTPATIENT
Start: 2023-03-14 | End: 2023-03-15 | Stop reason: HOSPADM

## 2023-03-14 RX ORDER — ACETAMINOPHEN 500 MG
500 TABLET ORAL
Status: DISCONTINUED | OUTPATIENT
Start: 2023-03-14 | End: 2023-03-15 | Stop reason: HOSPADM

## 2023-03-14 RX ORDER — OXYCODONE HYDROCHLORIDE 5 MG/1
2.5-5 TABLET ORAL
Qty: 42 TABLET | Refills: 0 | Status: SHIPPED | OUTPATIENT
Start: 2023-03-14 | End: 2023-03-21

## 2023-03-14 RX ORDER — FENTANYL CITRATE 50 UG/ML
25 INJECTION, SOLUTION INTRAMUSCULAR; INTRAVENOUS
Status: DISCONTINUED | OUTPATIENT
Start: 2023-03-14 | End: 2023-03-14 | Stop reason: HOSPADM

## 2023-03-14 RX ORDER — SODIUM CHLORIDE, SODIUM LACTATE, POTASSIUM CHLORIDE, CALCIUM CHLORIDE 600; 310; 30; 20 MG/100ML; MG/100ML; MG/100ML; MG/100ML
50 INJECTION, SOLUTION INTRAVENOUS CONTINUOUS
Status: DISCONTINUED | OUTPATIENT
Start: 2023-03-14 | End: 2023-03-14 | Stop reason: HOSPADM

## 2023-03-14 RX ORDER — HYDROMORPHONE HYDROCHLORIDE 1 MG/ML
0.2 INJECTION, SOLUTION INTRAMUSCULAR; INTRAVENOUS; SUBCUTANEOUS
Status: DISCONTINUED | OUTPATIENT
Start: 2023-03-14 | End: 2023-03-14 | Stop reason: HOSPADM

## 2023-03-14 RX ORDER — NALOXONE HYDROCHLORIDE 0.4 MG/ML
0.4 INJECTION, SOLUTION INTRAMUSCULAR; INTRAVENOUS; SUBCUTANEOUS AS NEEDED
Status: DISCONTINUED | OUTPATIENT
Start: 2023-03-14 | End: 2023-03-15 | Stop reason: HOSPADM

## 2023-03-14 RX ORDER — INSULIN LISPRO 100 [IU]/ML
INJECTION, SOLUTION INTRAVENOUS; SUBCUTANEOUS
Status: DISCONTINUED | OUTPATIENT
Start: 2023-03-14 | End: 2023-03-15 | Stop reason: HOSPADM

## 2023-03-14 RX ORDER — FENTANYL CITRATE 50 UG/ML
50 INJECTION, SOLUTION INTRAMUSCULAR; INTRAVENOUS AS NEEDED
Status: DISCONTINUED | OUTPATIENT
Start: 2023-03-14 | End: 2023-03-14 | Stop reason: HOSPADM

## 2023-03-14 RX ORDER — DEXAMETHASONE SODIUM PHOSPHATE 4 MG/ML
INJECTION, SOLUTION INTRA-ARTICULAR; INTRALESIONAL; INTRAMUSCULAR; INTRAVENOUS; SOFT TISSUE AS NEEDED
Status: DISCONTINUED | OUTPATIENT
Start: 2023-03-14 | End: 2023-03-14 | Stop reason: HOSPADM

## 2023-03-14 RX ORDER — OXYCODONE HYDROCHLORIDE 5 MG/1
2.5 TABLET ORAL
Status: DISCONTINUED | OUTPATIENT
Start: 2023-03-14 | End: 2023-03-15 | Stop reason: HOSPADM

## 2023-03-14 RX ORDER — HYDROXYZINE HYDROCHLORIDE 10 MG/1
10 TABLET, FILM COATED ORAL
Status: DISCONTINUED | OUTPATIENT
Start: 2023-03-14 | End: 2023-03-15 | Stop reason: HOSPADM

## 2023-03-14 RX ORDER — ASPIRIN 81 MG/1
81 TABLET ORAL 2 TIMES DAILY
Status: DISCONTINUED | OUTPATIENT
Start: 2023-03-14 | End: 2023-03-15 | Stop reason: HOSPADM

## 2023-03-14 RX ORDER — ACETAMINOPHEN 325 MG/1
650 TABLET ORAL ONCE
Status: DISCONTINUED | OUTPATIENT
Start: 2023-03-14 | End: 2023-03-14 | Stop reason: SDUPTHER

## 2023-03-14 RX ORDER — FACIAL-BODY WIPES
10 EACH TOPICAL DAILY PRN
Status: DISCONTINUED | OUTPATIENT
Start: 2023-03-16 | End: 2023-03-15 | Stop reason: HOSPADM

## 2023-03-14 RX ORDER — PREGABALIN 75 MG/1
75 CAPSULE ORAL ONCE
Status: COMPLETED | OUTPATIENT
Start: 2023-03-14 | End: 2023-03-14

## 2023-03-14 RX ORDER — ACETAMINOPHEN 500 MG
1000 TABLET ORAL ONCE
Status: COMPLETED | OUTPATIENT
Start: 2023-03-14 | End: 2023-03-14

## 2023-03-14 RX ORDER — OXYCODONE HYDROCHLORIDE 5 MG/1
5 TABLET ORAL
Status: DISCONTINUED | OUTPATIENT
Start: 2023-03-14 | End: 2023-03-15 | Stop reason: HOSPADM

## 2023-03-14 RX ORDER — LIDOCAINE HYDROCHLORIDE 10 MG/ML
0.1 INJECTION, SOLUTION EPIDURAL; INFILTRATION; INTRACAUDAL; PERINEURAL AS NEEDED
Status: DISCONTINUED | OUTPATIENT
Start: 2023-03-14 | End: 2023-03-14 | Stop reason: HOSPADM

## 2023-03-14 RX ORDER — AMOXICILLIN 250 MG
1 CAPSULE ORAL 2 TIMES DAILY
Status: DISCONTINUED | OUTPATIENT
Start: 2023-03-14 | End: 2023-03-15 | Stop reason: HOSPADM

## 2023-03-14 RX ORDER — ACETAMINOPHEN 500 MG
500 TABLET ORAL EVERY 4 HOURS
Qty: 100 TABLET | Refills: 0 | Status: SHIPPED | OUTPATIENT
Start: 2023-03-14

## 2023-03-14 RX ADMIN — Medication 80 MCG: at 13:01

## 2023-03-14 RX ADMIN — SODIUM CHLORIDE 125 ML/HR: 9 INJECTION, SOLUTION INTRAVENOUS at 16:25

## 2023-03-14 RX ADMIN — KETOROLAC TROMETHAMINE 15 MG: 30 INJECTION, SOLUTION INTRAMUSCULAR; INTRAVENOUS at 22:42

## 2023-03-14 RX ADMIN — PREGABALIN 75 MG: 75 CAPSULE ORAL at 11:32

## 2023-03-14 RX ADMIN — ASPIRIN 81 MG: 81 TABLET, COATED ORAL at 22:42

## 2023-03-14 RX ADMIN — SENNOSIDES AND DOCUSATE SODIUM 1 TABLET: 50; 8.6 TABLET ORAL at 18:48

## 2023-03-14 RX ADMIN — MIDAZOLAM 2 MG: 1 INJECTION INTRAMUSCULAR; INTRAVENOUS at 11:54

## 2023-03-14 RX ADMIN — FENTANYL CITRATE 100 MCG: 50 INJECTION, SOLUTION INTRAMUSCULAR; INTRAVENOUS at 11:54

## 2023-03-14 RX ADMIN — TRANEXAMIC ACID 1 G: 100 INJECTION, SOLUTION INTRAVENOUS at 12:45

## 2023-03-14 RX ADMIN — PHENYLEPHRINE HYDROCHLORIDE 40 MCG/MIN: 10 INJECTION INTRAVENOUS at 13:01

## 2023-03-14 RX ADMIN — CEFAZOLIN 2 G: 1 INJECTION, POWDER, FOR SOLUTION INTRAMUSCULAR; INTRAVENOUS at 22:41

## 2023-03-14 RX ADMIN — ONDANSETRON HYDROCHLORIDE 4 MG: 2 INJECTION, SOLUTION INTRAMUSCULAR; INTRAVENOUS at 12:57

## 2023-03-14 RX ADMIN — PROPOFOL 40 MG: 10 INJECTION, EMULSION INTRAVENOUS at 12:38

## 2023-03-14 RX ADMIN — Medication 2 UNITS: at 22:48

## 2023-03-14 RX ADMIN — SODIUM CHLORIDE: 900 INJECTION, SOLUTION INTRAVENOUS at 15:08

## 2023-03-14 RX ADMIN — SODIUM CHLORIDE, PRESERVATIVE FREE 10 ML: 5 INJECTION INTRAVENOUS at 22:50

## 2023-03-14 RX ADMIN — ATORVASTATIN CALCIUM 10 MG: 10 TABLET, FILM COATED ORAL at 18:48

## 2023-03-14 RX ADMIN — SODIUM CHLORIDE 125 ML/HR: 9 INJECTION, SOLUTION INTRAVENOUS at 22:49

## 2023-03-14 RX ADMIN — ACETAMINOPHEN 1000 MG: 500 TABLET ORAL at 11:33

## 2023-03-14 RX ADMIN — ACETAMINOPHEN 500 MG: 500 TABLET ORAL at 22:42

## 2023-03-14 RX ADMIN — ROPIVACAINE HYDROCHLORIDE 25 ML: 5 INJECTION, SOLUTION EPIDURAL; INFILTRATION; PERINEURAL at 11:59

## 2023-03-14 RX ADMIN — SODIUM CHLORIDE, POTASSIUM CHLORIDE, SODIUM LACTATE AND CALCIUM CHLORIDE 50 ML/HR: 600; 310; 30; 20 INJECTION, SOLUTION INTRAVENOUS at 11:20

## 2023-03-14 RX ADMIN — PROPOFOL 75 MCG/KG/MIN: 10 INJECTION, EMULSION INTRAVENOUS at 12:35

## 2023-03-14 RX ADMIN — MEPIVACAINE HYDROCHLORIDE 45 MG: 15 INJECTION, SOLUTION EPIDURAL; INFILTRATION at 12:54

## 2023-03-14 RX ADMIN — DEXMEDETOMIDINE HYDROCHLORIDE 10 MCG: 100 INJECTION, SOLUTION, CONCENTRATE INTRAVENOUS at 12:42

## 2023-03-14 RX ADMIN — DEXAMETHASONE SODIUM PHOSPHATE 4 MG: 4 INJECTION, SOLUTION INTRAMUSCULAR; INTRAVENOUS at 12:57

## 2023-03-14 RX ADMIN — WATER 2 G: 1 INJECTION INTRAMUSCULAR; INTRAVENOUS; SUBCUTANEOUS at 13:15

## 2023-03-14 RX ADMIN — ACETAMINOPHEN 500 MG: 500 TABLET ORAL at 18:48

## 2023-03-14 RX ADMIN — DEXMEDETOMIDINE HYDROCHLORIDE 10 MCG: 100 INJECTION, SOLUTION, CONCENTRATE INTRAVENOUS at 12:38

## 2023-03-14 RX ADMIN — CELECOXIB 200 MG: 200 CAPSULE ORAL at 11:32

## 2023-03-14 NOTE — DISCHARGE SUMMARY
[address] SUMMARY     Name: Jake Pinto       MR#: [id number]    : 1955  ADMIT DATE: 3/14/2023  DISCHARGE DATE: 3/15/2023     ADMISSION DIAGNOSIS: Primary osteoarthritis of left knee [M17.12]     DISCHARGE DIAGNOSIS: Primary osteoarthritis of left knee      PROCEDURE PERFORMED: Procedure(s):  LEFT TOTAL KNEE ARTHROPLASTY     CONSULTATIONS:  None. HISTORY OF PRESENT ILLNESS: The patient is a 68-year-old gentleman with progressive left knee pain due to severe tricompartmental osteoarthritis. Symptoms have progressed despite comprehensive conservative treatment and presents for left total knee replacement. Risks, benefits, alternatives of procedure were reviewed with him in detail and he desires to proceed. HOSPITAL COURSE:  The patient underwent the aforementioned procedure on date of admission under spinal anesthesia. There were no immediate postoperative complications. He was started on a multimodal pain regimen and DVT prophylaxis. DISPOSITION: The patient made slow, steady progress with physical therapy and was appropriate for discharge to Home in stable condition on postoperative day 1. DISCHARGE MEDICATIONS:  Reinitiate preadmission medications. In addition, the patient will be on ASA for DVT prophylaxis and low dose oxycodone and Tylenol for pain. DISCHARGE INSTRUCTIONS:  Detailed printed instructions were provided to the patient. Follow up with Dr. Jean Marie Garcia in approximately 3 weeks. The patient will receive home health physical therapy in the meantime.     Signed by: Kajal Cooney PA-C  3/16/2023

## 2023-03-14 NOTE — ANESTHESIA POSTPROCEDURE EVALUATION
Post-Anesthesia Evaluation and Assessment    Patient: Charles Ricardo MRN: [id number]  SSN: [id number]    YOB: 1955  Age: 75 y.o. Sex: male      I have evaluated the patient and they are stable and ready for discharge from the PACU. Cardiovascular Function/Vital Signs  Visit Vitals  /69   Pulse 73   Temp 36.7 °C (98.1 °F)   Resp 17   Ht 5' 11\" (1.803 m)   Wt 105.7 kg (233 lb)   SpO2 97%   BMI 32.50 kg/m²       Patient is status post Spinal anesthesia for Procedure(s):  LEFT TOTAL KNEE ARTHROPLASTY. Nausea/Vomiting: None    Postoperative hydration reviewed and adequate. Pain:  Pain Scale 1: Numeric (0 - 10) (03/14/23 1600)  Pain Intensity 1: 0 (03/14/23 1600)   Managed    Neurological Status:   Neuro (WDL): Within Defined Limits (03/14/23 1600)   At baseline    Mental Status, Level of Consciousness: Alert and  oriented to person, place, and time    Pulmonary Status:   O2 Device: None (Room air) (03/14/23 1600)   Adequate oxygenation and airway patent    Complications related to anesthesia: None    Post-anesthesia assessment completed.  No concerns    Signed By: William Staley MD     March 14, 2023

## 2023-03-14 NOTE — ANESTHESIA PREPROCEDURE EVALUATION
Relevant Problems   No relevant active problems       Anesthetic History   No history of anesthetic complications            Review of Systems / Medical History  Patient summary reviewed, nursing notes reviewed and pertinent labs reviewed    Pulmonary            Asthma        Neuro/Psych   Within defined limits           Cardiovascular  Within defined limits  Hypertension              Exercise tolerance: >4 METS     GI/Hepatic/Renal  Within defined limits   GERD           Endo/Other  Within defined limits  Diabetes    Arthritis     Other Findings              Physical Exam    Airway  Mallampati: II  TM Distance: 4 - 6 cm  Neck ROM: normal range of motion   Mouth opening: Normal     Cardiovascular  Regular rate and rhythm,  S1 and S2 normal,  no murmur, click, rub, or gallop             Dental  No notable dental hx       Pulmonary  Breath sounds clear to auscultation               Abdominal  GI exam deferred       Other Findings            Anesthetic Plan    ASA: 2  Anesthesia type: spinal and regional - saphenous block      Post-op pain plan if not by surgeon: peripheral nerve block single    Induction: Intravenous  Anesthetic plan and risks discussed with: Patient

## 2023-03-14 NOTE — ANESTHESIA PROCEDURE NOTES
Spinal Block    Start time: 3/14/2023 12:44 PM  Performed by: Ritesh Ponce MD  Authorized by: Ritesh Ponce MD     Pre-procedure:   Indications: primary anesthetic  Preanesthetic Checklist: patient identified, risks and benefits discussed, site marked, patient being monitored and timeout performed    Timeout Time: 12:44 EDT      Spinal Block:   Patient Position:  Seated  Prep Region:  Lumbar  Prep: chlorhexidine and patient draped      Location:  L3-4  Technique:  Single shot  Local: mepivacaine-pf (CARBOCAINE-PF) 1.5% PF injection - Other   45 mg - 3/14/2023 12:54:00 PM  Local Dose (mL):  3.5  Med Admin Time: 3/14/2023 12:44 PM    Needle:   Needle Type:  Pencil-tip  Needle Gauge:  25 G  Attempts:  1      Events: CSF confirmed, no blood with aspiration and no paresthesia        Assessment:  Insertion:  Uncomplicated  Patient tolerance:  Patient tolerated the procedure well with no immediate complications

## 2023-03-14 NOTE — PERIOP NOTES
TRANSFER - OUT REPORT:    Verbal report given to [facility] ,RN (name) on Kevin Bethea  being transferred to  (unit) for routine post - op       Report consisted of patients Situation, Background, Assessment and   Recommendations(SBAR). Information from the following report(s) SBAR, Kardex, Intake/Output, MAR, Recent Results, Cardiac Rhythm NSR, and Alarm Parameters  was reviewed with the receiving nurse. Lines:   Peripheral IV 03/14/23 Posterior;Right Forearm (Active)   Site Assessment Clean, dry, & intact 03/14/23 1526   Phlebitis Assessment 0 03/14/23 1526   Infiltration Assessment 0 03/14/23 1526   Dressing Status Clean, dry, & intact 03/14/23 1526   Dressing Type Tape;Transparent 03/14/23 1526   Hub Color/Line Status Green; Infusing 03/14/23 1526   Action Taken Open ports on tubing capped 03/14/23 1526   Alcohol Cap Used Yes 03/14/23 1526        Opportunity for questions and clarification was provided.       Patient transported with:   [employer]

## 2023-03-14 NOTE — H&P
SUBJECTIVE/OBJECTIVE:  Juice Veliz presents for left total knee replacement. Left knee pain is constant. Aches at rest.  Occasional wakening neck pain. Difficulty climbing stairs. He does not trust the knee on uneven ground. Pain and stiffness after prolonged sitting. Cannot stand still in 1 position for more than a few minutes. Remote history of cortisone injections with decreasing efficacy. Past Medical History:   Diagnosis Date    Arthralgia of knee 07/15/2013    Arthritis     Diabetes ([address]     type 2    Diverticular disease of left colon 03/22/2013    GERD (gastroesophageal reflux disease)     Gout 12/06/1985    Hypertension     SLIGHTLY PER PT'S PCP     Past Surgical History:   Procedure Laterality Date    HX COLONOSCOPY  2000    Dr. Reji Trinidad    HX HEENT      deviated septum age 12    HX KNEE REPLACEMENT Right 02/10/2022    HX ORTHOPAEDIC  1974    REPAIR OF CARTILAGE IN RT KNEE JOINT. HX OTHER SURGICAL  01/21/2016    excision of lymph node right axilla    HX TONSILLECTOMY      child     No Known Allergies    No current facility-administered medications on file prior to encounter. Current Outpatient Medications on File Prior to Encounter   Medication Sig Dispense Refill    lisinopril-hydroCHLOROthiazide (PRINZIDE, ZESTORETIC) 20-25 mg per tablet Take 1 Tablet by mouth daily. 90 Tablet 1    colchicine 0.6 mg tablet TAKE 1 TABLET BY MOUTH TWICE A DAY FOR 10 DAYS (Patient taking differently: Take 0.6 mg by mouth as needed.) 20 Tablet 0    atorvastatin (LIPITOR) 10 mg tablet TAKE 1 TABLET BY MOUTH EVERY DAY (Patient taking differently: Take 10 mg by mouth every evening. TAKE 1 TABLET BY MOUTH EVERY DAY) 90 Tablet 1    metFORMIN ER (GLUCOPHAGE XR) 500 mg tablet TAKE 3 TABLETS BY MOUTH EVERY DAY WITH DINNER (Patient taking differently: Take 500 mg by mouth two (2) times a day.  TAKE 3 TABLETS BY MOUTH EVERY DAY WITH DINNER) 90 Tablet 0    sildenafil citrate (Viagra) 100 mg tablet Take 1 Tablet by mouth daily as needed for Erectile Dysfunction. 10 Tablet 2     Social History     Socioeconomic History    Marital status:      Spouse name: Not on file    Number of children: Not on file    Years of education: Not on file    Highest education level: Not on file   Occupational History    Not on file   Tobacco Use    Smoking status: Never    Smokeless tobacco: Never   Vaping Use    Vaping Use: Never used   Substance and Sexual Activity    Alcohol use: Yes     Alcohol/week: 1.0 standard drink     Types: 1 Drinks containing 0.5 oz of alcohol per week     Comment: VERY RARE    Drug use: No    Sexual activity: Yes     Partners: Female   Other Topics Concern    Not on file   Social History Narrative    Not on file     Social Determinants of Health     Financial Resource Strain: Not on file   Food Insecurity: Not on file   Transportation Needs: Not on file   Physical Activity: Not on file   Stress: Not on file   Social Connections: Not on file   Intimate Partner Violence: Not on file   Housing Stability: Not on file     Family History   Problem Relation Age of Onset    Dementia Mother     Cancer Father         lung    Diabetes Maternal Grandmother     Diabetes Paternal Grandmother     Anesth Problems Neg Hx      ROS:     Patient denies any recent fever, chills, nausea, vomiting, chest pain, or shortness of breath. PHYSICAL EXAM:  Vitals:   Visit Vitals  BP (!) 156/85 (BP 1 Location: Right upper arm, BP Patient Position: At rest)   Pulse (!) 102   Temp 97.8 °F (36.6 °C)   Resp 16   Ht 5' 11\" (1.803 m)   Wt 233 lb (105.7 kg)   SpO2 98%   BMI 32.50 kg/m²        75y.o. year old M in no acute distress. chest clear. Heart RRR. Ambulates with a limp on the left. Pain-free motion of both hips. Right total knee neutral alignment. Healed midline anterior scar. Satisfactory range of motion. Stable throughout arc of motion. Varus alignment left knee. Tender medially. ROM 0-120.   Pseudolaxity is present. Palpable Baker's cyst, nonpulsatile. Skin warm, dry. No effusion. Motor 5/5. No distal edema. IMAGING:  Radiographs: XR Results (most recent):  Results from Appointment encounter on 01/27/23     XR KNEES BI MIN 4 V     Narrative  4 x-ray views of both knees including AP and PA flexion, lateral, sunrise demonstrate satisfactory position and alignment of cemented total knee components in the right knee. No evidence of loosening. Patella tracks centrally with lateral patellar tilt. Severe osteoarthritis in the left knee with complete loss of medial compartment joint space and moderate loss patellofemoral space. Tricompartmental osteophytes present. ASSESSMENT/PLAN:  1. Left knee OA     Proceed with left total knee replacement. Discussed risks, benefits, and alternatives in detail, as well as anticipated hospital stay and course of rehabilitation. All questions answered. Plan is for IV tranexamic acid intraoperatively, ASA for DVT prophylaxis.          Lee Crystal MD

## 2023-03-14 NOTE — BRIEF OP NOTE
Brief Postoperative Note    Patient: Michael Jurado  YOB: 1955  MRN: [id number]    Date of Procedure: 3/14/2023     Pre-Op Diagnosis: Primary osteoarthritis of left knee [M17.12]    Post-Op Diagnosis: Same as preoperative diagnosis. Procedure(s):  LEFT TOTAL KNEE ARTHROPLASTY    Surgeon(s):  Laurie Borjas MD    Surgical Assistant: Physician Assistant: Pastor Shane PA-C  Surg Asst-1: Romina Hopkins    Anesthesia: Spinal     Estimated Blood Loss (mL): 252     Complications: None    Specimens: * No specimens in log *     Implants:   Implant Name Type Inv.  Item Serial No.  Lot No. LRB No. Used Action   CEMENT BNE MED VISC 80 GM GENTAMICIN PALACOS MV+G PRO - SN/A  CEMENT BNE MED VISC 80 GM GENTAMICIN PALACOS MV+G PRO N/A [facility]_ [id number] Left 1 Implanted   BASEPLATE TIB SZ 10 L NP STEMMABLE EMPOWR --  - SN/A  BASEPLATE TIB SZ 10 L NP STEMMABLE EMPOWR --  N/A [facility] [id number] Left 1 Implanted   COMPONENT FEM SZ 9 L KNEE NP EMPOWR 3D --  - SN/A  COMPONENT FEM SZ 9 L KNEE NP EMPOWR 3D --  N/A [employer] [id number] Left 1 Implanted   INSERT TIB SZ 10 [id number] L KNEE EMPOWR 3D E + --  - SN/A  INSERT TIB SZ 10 [id number] L KNEE EMPOWR 3D E + --  N/A [employer] [id number] Left 1 Implanted   COMPONENT PAT OD35MM THK9MM POLYETH DOMED STYL TRI PEG EPIK --  - SN/A  COMPONENT PAT OD35MM THK9MM POLYETH DOMED STYL TRI PEG EPIK --  N/A [facility] [id number] Left 1 Implanted       Drains: * No LDAs found *    Findings: left knee oa    Electronically Signed by Amy Bridges PA-C on 3/14/2023 at 2:28 PM

## 2023-03-14 NOTE — PROGRESS NOTES
Problem: Mobility Impaired (Adult and Pediatric)  Goal: *Acute Goals and Plan of Care (Insert Text)  Description: FUNCTIONAL STATUS PRIOR TO ADMISSION: Patient was independent without use of DME.    HOME SUPPORT PRIOR TO ADMISSION: The patient lived with wife  but did not require assist.    Physical Therapy Goals  Initiated 3/14/2023    1. Patient will move from supine to sit and sit to supine  and scoot up and down in bed with modified independence within 4 days. 2. Patient will perform sit to stand with modified independence within 4 days. 3. Patient will ambulate with modified independence for > 150 feet with the least restrictive device within 4 days. 4. Patient will ascend/descend 4 stairs with one handrail(s) with supervision within 4 days. 5. Patient will perform home exercise program per protocol with supervision/set-up within 4 days. 6. Patient will demonstrate AROM 0-90 degrees in operative joint within 4 days. PHYSICAL THERAPY EVALUATION  Patient: Michael Diaz (34 y.o. male)  Date: 3/14/2023  Primary Diagnosis: Primary osteoarthritis of left knee [M17.12]  Procedure(s) (LRB):  LEFT TOTAL KNEE ARTHROPLASTY (Left) Day of Surgery   Precautions:   WBAT    ASSESSMENT  Based on the objective data described below, the patient presents POD # 0 left TKR with pain left knee, decreased AROM/strength and function left leg, decreased activity tolerance, decline in functional mobility and impaired standing balance/gait with RW. Pt mobilized easily with no c/o dizziness or nausea. Pt s/p right TKA 1 year ago. Reviewed and performed supine TKA exercise. Reviewed and instructed in proper positioning to avoid external rotation and knee flexion in supine  - using blanket roll to support leg in neutral position. Reviewed importance of not placing pillow under knee to position knee in prolonged flexion.   Anticipate pt will be able to increase amb distance, advance exercise and perform stairs during am session on 3/15 and be cleared for early discharge from PT standpoint. Current Level of Function Impacting Discharge (mobility/balance): Standby assist for supine to/from stand; CGA for transfers/amb with RW    Functional Outcome Measure: The patient scored 55/100 on the Barthel Index outcome measure. Other factors to consider for discharge: Prior TKR, wife available to assist at discharge, has required DME     Patient will benefit from skilled therapy intervention to address the above noted impairments. PLAN :  Recommendations and Planned Interventions: bed mobility training, transfer training, gait training, therapeutic exercises, patient and family training/education, and therapeutic activities      Frequency/Duration: Patient will be followed by physical therapy:  twice daily to address goals. Recommendation for discharge: (in order for the patient to meet his/her long term goals)  Physical therapy at least 2 days/week in the home     This discharge recommendation:  Has been made in collaboration with the attending provider and/or case management    IF patient discharges home will need the following DME: patient owns DME required for discharge         SUBJECTIVE:   Patient stated Last year - I only stayed one night - hoping I will go home tomorrow.     OBJECTIVE DATA SUMMARY:   HISTORY:    Past Medical History:   Diagnosis Date    Arthralgia of knee 07/15/2013    Arthritis     Diabetes ([facility] [address]     type 2    Diverticular disease of left colon 03/22/2013    GERD (gastroesophageal reflux disease)     Gout 12/06/1985    Hypertension     SLIGHTLY PER PT'S PCP     Past Surgical History:   Procedure Laterality Date    HX COLONOSCOPY  2000    Dr. Paul Trinidad    HX HEENT      deviated septum age 12    HX KNEE REPLACEMENT Right 02/10/2022    HX ORTHOPAEDIC  1974    REPAIR OF CARTILAGE IN RT KNEE JOINT.     HX OTHER SURGICAL  01/21/2016    excision of lymph node right axilla    HX TONSILLECTOMY      child Personal factors and/or comorbidities impacting plan of care: as above    Home Situation  Home Environment: Private residence  # Steps to Enter: 3  Rails to Enter: Yes  Hand Rails : Bilateral (too far to reach)  One/Two Story Residence: Two story, live on 1st floor  # of Interior Steps: 17  Living Alone: No  Support Systems: Spouse/Significant Other  Patient Expects to be Discharged to[de-identified] Home with home health  Current DME Used/Available at Home: Nathen Stayton, straight, Walker, rolling    EXAMINATION/PRESENTATION/DECISION MAKING:   Critical Behavior:  Neurologic State: Alert  Orientation Level: Oriented X4  Cognition: Appropriate decision making, Appropriate safety awareness  Safety/Judgement: Awareness of environment, Good awareness of safety precautions  Hearing: Auditory  Auditory Impairment: None  Hearing Aids/Status: Does not own  Skin:  Left leg covered with ace wrap - no drainage noted    Range Of Motion:  AROM: Within functional limits (except left knee)                       Strength:    Strength: Within functional limits (except left knee)                    Tone & Sensation:   Tone: Normal              Sensation: Intact               Coordination:  Coordination: Within functional limits    Functional Mobility:  Bed Mobility:     Supine to Sit: Stand-by assistance  Sit to Supine: Stand-by assistance  Scooting: Stand-by assistance  Transfers:  Sit to Stand: Contact guard assistance  Stand to Sit: Contact guard assistance                       Balance:   Sitting: Intact; Without support  Standing: Impaired; With support  Standing - Static: Good;Constant support  Standing - Dynamic : Fair;Constant support (requires RW at all times)  Ambulation/Gait Training:  Distance (ft): 50 Feet (ft)  Assistive Device: Walker, rolling;Gait belt  Ambulation - Level of Assistance: Contact guard assistance; Adaptive equipment; Additional time;Assist x1        Gait Abnormalities: Decreased step clearance  Right Side Weight Bearing: Full  Left Side Weight Bearing: As tolerated  Base of Support: Center of gravity altered;Shift to right  Stance: Left decreased  Speed/Erin: Slow  Step Length: Right shortened;Left shortened  Swing Pattern: Left asymmetrical             Therapeutic Exercises:   Pt instructed and performed ankle pumps and demonstrated proper use of incentive spirometer - to be performed x 10 reps once hr when awake. Pt instructed and performed quad sets, hamstring sets and heel slides - to be performed 3 - 5 reps once hr when awake as tolerated. Written instructions provided on pt's communication board. Functional Measure:  Barthel Index:    Bathin  Bladder: 10  Bowels: 10  Groomin  Dressin  Feeding: 10  Mobility: 0  Stairs: 0  Toilet Use: 5  Transfer (Bed to Chair and Back): 10  Total: 55/100       The Barthel ADL Index: Guidelines  1. The index should be used as a record of what a patient does, not as a record of what a patient could do. 2. The main aim is to establish degree of independence from any help, physical or verbal, however minor and for whatever reason. 3. The need for supervision renders the patient not independent. 4. A patient's performance should be established using the best available evidence. Asking the patient, friends/relatives and nurses are the usual sources, but direct observation and common sense are also important. However direct testing is not needed. 5. Usually the patient's performance over the preceding 24-48 hours is important, but occasionally longer periods will be relevant. 6. Middle categories imply that the patient supplies over 50 per cent of the effort. 7. Use of aids to be independent is allowed. Score Interpretation (from [address] [address])    Independent   60-79 Minimally independent   40-59 Partially dependent   20-39 Very dependent   <20 Totally dependent     -Brenton Doherty., Barthel, D.W. (1965). Functional evaluation: the Barthel Index.  [address] [facility] (14)2.  -ZAIRE Liao, [address] (1997). The Barthel activities of daily living index: self-reporting versus actual performance in the old (> or = 75 years). Journal [place] [address](7), [address] [facility], [facility]., Antonio Harden., Pauline Jeff. (1999). Measuring the change in disability after inpatient rehabilitation; comparison of the responsiveness of the Barthel Index and Functional Rose Measure. Journal of Neurology, Neurosurgery, and Psychiatry, 66(4), 886-832. NICOLETTE Meyer, ROCHELLE Sampson, & Bette Rollins MAMELIA. (2004) Assessment of post-stroke quality of life in cost-effectiveness studies: The usefulness of the Barthel Index and the EuroQoL-5D. Quality of Life Research, 15, 243-55           Physical Therapy Evaluation Charge Determination   History Examination Presentation Decision-Making   LOW Complexity : Zero comorbidities / personal factors that will impact the outcome / POC LOW Complexity : 1-2 Standardized tests and measures addressing body structure, function, activity limitation and / or participation in recreation  LOW Complexity : Stable, uncomplicated  LOW Complexity : FOTO score of       Based on the above components, the patient evaluation is determined to be of the following complexity level: LOW     Pain Rating:  Left knee 0/10    Activity Tolerance:   Good - POD# 0 mobilized without difficulty     After treatment patient left in no apparent distress:   Supine in bed, Call bell within reach, Caregiver / family present, Side rails x 3, and ice applied to left knee    COMMUNICATION/EDUCATION:   The patients plan of care was discussed with: Registered nurse. Fall prevention education was provided and the patient/caregiver indicated understanding., Patient/family have participated as able in goal setting and plan of care. , and Patient/family agree to work toward stated goals and plan of care.   Pt educated on fall prevention and safety in hospital - Instructed to utilize call button and wait for assistance prior to attempting OOB or standing from chair/toilet.      Thank you for this referral.  Pricilla Napoles, PT   Time Calculation: 35 mins

## 2023-03-14 NOTE — DISCHARGE INSTRUCTIONS
Post-op Discharge Instructions Following Total Joint Replacement  MD Sharri Batista  [phone]  Follow-up Office Visit  See Dr. Igor Glass approximately 3-4 weeks from date of surgery. Call [phone] to make an appointment. Call William Cantu LPN if you have questions or concerns, [phone]. Activity  Use your walker for ambulation. Weight bearing as tolerated unless instructed otherwise by the physical therapist. Get up every hour you are awake and take a brief walk. Lengthen walking distance daily as your strength improves. Continue using your walker until seen in the office for your first follow up visit. Practice your exercises 3 times daily as instructed by the physical therapist. Roger Thao for 20 minutes after exercising. No driving until seen in the office for your first follow up visit. Incision Care  The light brown Aquacel surgical dressing is waterproof and is to remain on your incision for 7 days. On the 7th day, carefully lift the edge of the dressing to break the adhesive seal and gently peel it off. If your Aquacel dressings comes loose or falls off before the 7th day, replace it with a dry sterile gauze dressing and change this dressing daily. Once there is no drainage on the bandage, you mean leave the incision open to air. You may take a shower with the Aquacel dressing in place. After you remove the Aquacel dressing on day 7, you may continue to shower and get your incision wet in the shower. Do not submerge your incision under water in a bathtub, hot tub, swimming pool, etc. until after you have been evaluated at your first office visit. Medications  Blood Clot Prevention: Take medication as prescribed by your physician for 4 weeks postop. Pain Management: Take pain medication as prescribed; wean yourself off of pain medication as your pain lessens. Take with food. You make also take Tylenol every 4-6 hours as needed for pain.   Do not exceed 3 grams (3000mg) per day. Place an ice bag on or around the incision for 20 minutes on / 20 minutes off as needed throughout the day and night, especially after exercising. Stool Softener: You may want to take a stool softener (such as Senokot-S or Colace) to prevent constipation while taking pain medication. If constipation occurs, you may also use a laxative (such as Dulcolax tablets, Miralax, or a suppository). Diet  Resume usual diet at home. Drink plenty of fluids. Eat foods high in fiber and protein. Calcium and Vitamin D supplements recommended. Avoid alcoholic beverages. No smoking. When to call your Orthopaedic Surgeon: If you call after 5pm or on a weekend, the on call physician will return your call  Pain that is not relieved by pain medication, ice, and activity modification  Signs of infection (red incision, continuous drainage from the incision, malodorous drainage, persistent fever greater than 101 degrees Fahrenheit)  Signs of a blood clot in your leg (calf pain, tenderness, redness, and/or swelling of the lower leg)  ?   When to call your Primary Care Physician  Concerns about your medical conditions such as diabetes, high blood pressure, asthma, congestive heart failure  Call if blood sugars are elevated, if you have a persistent headache or dizziness, coughing or congestion, constipation or diarrhea, burning with urination, abnormal heart rate (fast or slow)  When to call 911 and go to the nearest Emergency Room  Acute onset of chest pain, shortness of breath, difficulty breathing

## 2023-03-14 NOTE — ANESTHESIA PROCEDURE NOTES
Peripheral Block    Start time: 3/14/2023 11:59 AM  Performed by: Mahamed Alva MD  Authorized by: Mahamed Alva MD       Pre-procedure: Indications: at surgeon's request and post-op pain management    Preanesthetic Checklist: patient identified, risks and benefits discussed, site marked, timeout performed and patient being monitored    Timeout Time: 11:59 EDT      Block Type:   Block Type:   Adductor canal  Laterality:  Left  Monitoring:  Standard ASA monitoring, continuous pulse ox, frequent vital sign checks, heart rate, responsive to questions and oxygen  Injection Technique:  Single shot  Procedures: ultrasound guided    Patient Position: supine  Prep: chlorhexidine    Location:  Mid thigh  Needle Type:  Stimuplex  Needle Gauge:  21 G  Needle Localization:  Ultrasound guidance and anatomical landmarks  Medication Injected:  Ropivacaine (PF) (NAROPIN)(0.5%) 5 mg/mL injection - Peripheral Nerve Block   25 mL - 3/14/2023 11:59:00 AM  Med Admin Time: 3/14/2023 11:59 AM    Assessment:  Number of attempts:  1  Injection Assessment:  Incremental injection every 5 mL, local visualized surrounding nerve on ultrasound, negative aspiration for blood, no paresthesia, no intravascular symptoms and ultrasound image on chart  Patient tolerance:  Patient tolerated the procedure well with no immediate complications

## 2023-03-15 VITALS
RESPIRATION RATE: 16 BRPM | SYSTOLIC BLOOD PRESSURE: 141 MMHG | OXYGEN SATURATION: 99 % | HEART RATE: 85 BPM | HEIGHT: 71 IN | WEIGHT: 233 LBS | TEMPERATURE: 98.1 F | DIASTOLIC BLOOD PRESSURE: 72 MMHG | BODY MASS INDEX: 32.62 KG/M2

## 2023-03-15 LAB
ANION GAP SERPL CALC-SCNC: 9 MMOL/L (ref 5–15)
BASOPHILS # BLD: 0 K/UL (ref 0–0.1)
BASOPHILS NFR BLD: 0 % (ref 0–1)
BUN SERPL-MCNC: 23 MG/DL (ref 6–20)
BUN/CREAT SERPL: 15 (ref 12–20)
CALCIUM SERPL-MCNC: 7.9 MG/DL (ref 8.5–10.1)
CHLORIDE SERPL-SCNC: 107 MMOL/L (ref 97–108)
CO2 SERPL-SCNC: 22 MMOL/L (ref 21–32)
CREAT SERPL-MCNC: 1.54 MG/DL (ref 0.7–1.3)
DIFFERENTIAL METHOD BLD: ABNORMAL
EOSINOPHIL # BLD: 0 K/UL (ref 0–0.4)
EOSINOPHIL NFR BLD: 0 % (ref 0–7)
ERYTHROCYTE [DISTWIDTH] IN BLOOD BY AUTOMATED COUNT: 12.9 % (ref 11.5–14.5)
GLUCOSE BLD STRIP.AUTO-MCNC: 153 MG/DL (ref 65–117)
GLUCOSE BLD STRIP.AUTO-MCNC: 231 MG/DL (ref 65–117)
GLUCOSE SERPL-MCNC: 190 MG/DL (ref 65–100)
HCT VFR BLD AUTO: 43 % (ref 36.6–50.3)
HGB BLD-MCNC: 11.3 G/DL (ref 12.1–17)
HGB BLD-MCNC: 14.7 G/DL (ref 12.1–17)
IMM GRANULOCYTES # BLD AUTO: 0.1 K/UL (ref 0–0.04)
IMM GRANULOCYTES NFR BLD AUTO: 0 % (ref 0–0.5)
LYMPHOCYTES # BLD: 1 K/UL (ref 0.8–3.5)
LYMPHOCYTES NFR BLD: 9 % (ref 12–49)
MCH RBC QN AUTO: 30.4 PG (ref 26–34)
MCHC RBC AUTO-ENTMCNC: 34.2 G/DL (ref 30–36.5)
MCV RBC AUTO: 88.8 FL (ref 80–99)
MONOCYTES # BLD: 1.1 K/UL (ref 0–1)
MONOCYTES NFR BLD: 10 % (ref 5–13)
NEUTS SEG # BLD: 9.1 K/UL (ref 1.8–8)
NEUTS SEG NFR BLD: 81 % (ref 32–75)
NRBC # BLD: 0 K/UL (ref 0–0.01)
NRBC BLD-RTO: 0 PER 100 WBC
PLATELET # BLD AUTO: 174 K/UL (ref 150–400)
PMV BLD AUTO: 9.4 FL (ref 8.9–12.9)
POTASSIUM SERPL-SCNC: 4.5 MMOL/L (ref 3.5–5.1)
RBC # BLD AUTO: 4.84 M/UL (ref 4.1–5.7)
SERVICE CMNT-IMP: ABNORMAL
SERVICE CMNT-IMP: ABNORMAL
SODIUM SERPL-SCNC: 138 MMOL/L (ref 136–145)
WBC # BLD AUTO: 11.3 K/UL (ref 4.1–11.1)

## 2023-03-15 PROCEDURE — G0378 HOSPITAL OBSERVATION PER HR: HCPCS

## 2023-03-15 PROCEDURE — 82962 GLUCOSE BLOOD TEST: CPT

## 2023-03-15 PROCEDURE — 74011636637 HC RX REV CODE- 636/637: Performed by: PHYSICIAN ASSISTANT

## 2023-03-15 PROCEDURE — 74011000250 HC RX REV CODE- 250: Performed by: PHYSICIAN ASSISTANT

## 2023-03-15 PROCEDURE — 74011250637 HC RX REV CODE- 250/637

## 2023-03-15 PROCEDURE — 85025 COMPLETE CBC W/AUTO DIFF WBC: CPT

## 2023-03-15 PROCEDURE — 97116 GAIT TRAINING THERAPY: CPT

## 2023-03-15 PROCEDURE — 74011250636 HC RX REV CODE- 250/636: Performed by: PHYSICIAN ASSISTANT

## 2023-03-15 PROCEDURE — 80048 BASIC METABOLIC PNL TOTAL CA: CPT

## 2023-03-15 PROCEDURE — 74011250637 HC RX REV CODE- 250/637: Performed by: PHYSICIAN ASSISTANT

## 2023-03-15 PROCEDURE — 85018 HEMOGLOBIN: CPT

## 2023-03-15 PROCEDURE — 77030028907 HC WRP KNEE WO BGS SOLM -B

## 2023-03-15 PROCEDURE — 2709999900 HC NON-CHARGEABLE SUPPLY

## 2023-03-15 PROCEDURE — 36415 COLL VENOUS BLD VENIPUNCTURE: CPT

## 2023-03-15 RX ADMIN — LISINOPRIL: 20 TABLET ORAL at 10:35

## 2023-03-15 RX ADMIN — ASPIRIN 81 MG: 81 TABLET, COATED ORAL at 10:35

## 2023-03-15 RX ADMIN — KETOROLAC TROMETHAMINE 15 MG: 30 INJECTION, SOLUTION INTRAMUSCULAR; INTRAVENOUS at 03:04

## 2023-03-15 RX ADMIN — CEFAZOLIN 2 G: 1 INJECTION, POWDER, FOR SOLUTION INTRAMUSCULAR; INTRAVENOUS at 06:15

## 2023-03-15 RX ADMIN — Medication 2 UNITS: at 06:21

## 2023-03-15 RX ADMIN — ACETAMINOPHEN 500 MG: 500 TABLET ORAL at 06:15

## 2023-03-15 RX ADMIN — ACETAMINOPHEN 500 MG: 500 TABLET ORAL at 10:35

## 2023-03-15 RX ADMIN — Medication 3 UNITS: at 11:26

## 2023-03-15 NOTE — PROGRESS NOTES
Orthopaedics Daily Progress Note                            Date of Surgery:  3/14/2023      Patient: Bruna Crawford   YOB: 1955  Age: 75 y.o. SUBJECTIVE:   1 Day Post-Op following LEFT TOTAL KNEE ARTHROPLASTY. The patient's post operative pain is controlled. No CP/SOB. No N/V. The patient's mobility will be evaluated today during PT sessions. OBJECTIVE:     Vital Signs:    Visit Vitals  /75   Pulse 98   Temp 97.6 °F (36.4 °C)   Resp 16   Ht 5' 11\" (1.803 m)   Wt 233 lb (105.7 kg)   SpO2 98%   BMI 32.50 kg/m²       Physical Exam:  General: A&Ox3. The patient is cooperative, and in no acute distress. Respiratory: Respirations are unlabored. Surgical site(s): dressing clean, dry  Musculoskeletal: Calves are soft, supple, and non-tender upon palpation. Motor 5/5. Neurological:  Neurovascularly intact with good dorsi and plantar flexion.     Pulses symmetrical.    Laboratory Values:             Recent Results (from the past 12 hour(s))   GLUCOSE, POC    Collection Time: 03/14/23 10:01 PM   Result Value Ref Range    Glucose (POC) 244 (H) 65 - 117 mg/dL    Performed by Majo Zapien  PCT    METABOLIC PANEL, BASIC    Collection Time: 03/15/23 12:20 AM   Result Value Ref Range    Sodium 138 136 - 145 mmol/L    Potassium 4.5 3.5 - 5.1 mmol/L    Chloride 107 97 - 108 mmol/L    CO2 22 21 - 32 mmol/L    Anion gap 9 5 - 15 mmol/L    Glucose 190 (H) 65 - 100 mg/dL    BUN 23 (H) 6 - 20 MG/DL    Creatinine 1.54 (H) 0.70 - 1.30 MG/DL    BUN/Creatinine ratio 15 12 - 20      eGFR 49 (L) >60 ml/min/1.73m2    Calcium 7.9 (L) 8.5 - 10.1 MG/DL   HEMOGLOBIN    Collection Time: 03/15/23 12:20 AM   Result Value Ref Range    HGB 11.3 (L) 12.1 - 17.0 g/dL   CBC WITH AUTOMATED DIFF    Collection Time: 03/15/23  3:07 AM   Result Value Ref Range    WBC 11.3 (H) 4.1 - 11.1 K/uL    RBC 4.84 4.10 - 5.70 M/uL    HGB 14.7 12.1 - 17.0 g/dL    HCT 43.0 36.6 - 50.3 %    MCV 88.8 80.0 - 99.0 FL    MCH 30.4 26.0 - 34.0 PG    MCHC 34.2 30.0 - 36.5 g/dL    RDW 12.9 11.5 - 14.5 %    PLATELET 484 079 - 944 K/uL    MPV 9.4 8.9 - 12.9 FL    NRBC 0.0 0  WBC    ABSOLUTE NRBC 0.00 0.00 - 0.01 K/uL    NEUTROPHILS 81 (H) 32 - 75 %    LYMPHOCYTES 9 (L) 12 - 49 %    MONOCYTES 10 5 - 13 %    EOSINOPHILS 0 0 - 7 %    BASOPHILS 0 0 - 1 %    IMMATURE GRANULOCYTES 0 0.0 - 0.5 %    ABS. NEUTROPHILS 9.1 (H) 1.8 - 8.0 K/UL    ABS. LYMPHOCYTES 1.0 0.8 - 3.5 K/UL    ABS. MONOCYTES 1.1 (H) 0.0 - 1.0 K/UL    ABS. EOSINOPHILS 0.0 0.0 - 0.4 K/UL    ABS. BASOPHILS 0.0 0.0 - 0.1 K/UL    ABS. IMM. GRANS. 0.1 (H) 0.00 - 0.04 K/UL    DF AUTOMATED     GLUCOSE, POC    Collection Time: 03/15/23  6:16 AM   Result Value Ref Range    Glucose (POC) 153 (H) 65 - 117 mg/dL    Performed by Test Op          PLAN:     S/P LEFT TOTAL KNEE ARTHROPLASTY -Continue WBAT. -Mobilize and continue with PT/OT until discharged  Cont IVF until d/c, Cr slightly elevated     Hemodynamics Hgb today is 11.3. Acute blood loss anemia as expected. Patient asymptomatic. Continue to monitor. Wound Monitor postop dressing; no postop dressing changes necessary. Reinforce PRN. Post Operative Pain Pain Control: stable, mild-to-moderate joint symptoms intermittently, reasonably well controlled by current meds. DVT Prophylaxis Continue with SCD'S, Ankle Pump Exercises. ASA 81mg BID     Discharge Disposition Discharge plan: Home with HHPT pending PT clearance .        Signed By: hDeeraj Martini PA-C  March 15, 2023 8:13 AM

## 2023-03-15 NOTE — OP NOTES
[address]   OPERATIVE REPORT    Name:  Patricia Evans  MR#:  [id number]  :  1955  ACCOUNT #:  [de-identified]  DATE OF SERVICE:  2023    PREOPERATIVE DIAGNOSIS:  Osteoarthritis, left knee. POSTOPERATIVE DIAGNOSIS:  Osteoarthritis, left knee. PROCEDURE PERFORMED:  Left total knee arthroplasty. SURGEON:  Ron Ochoa MD    FIRST ASSISTANT:  Adeline Marrero PA-C    ANESTHESIA:  Spinal with sedation as well as adductor canal block. COMPLICATIONS:  None. SPECIMENS REMOVED:  None. IMPLANTS:  DonJoy Empowr 3-D size 9 femur, size 10 tibial tray with 14-mm polyethylene insert and 35-mm patella. ESTIMATED BLOOD LOSS:  200 mL. INDICATIONS:  The patient is a 54-year-old gentleman with progressive left knee pain due to severe tricompartmental osteoarthritis. Symptoms have progressed despite comprehensive conservative treatment and presents for left total knee replacement. Risks, benefits, alternatives of procedure were reviewed with him in detail and he desires to proceed. PROCEDURE:  Anesthesia Team performed an adductor canal block in the left thigh before taking the patient to the operating room and they also placed a spinal.  Preoperative IV antibiotics were administered. Padded pneumatic tourniquet was placed around left upper thigh. Left lower extremity was prepped and draped in the usual sterile fashion. Tourniquet was inflated to 275. Through a midline anterior knee incision, I performed a medial parapatellar arthrotomy. Progressive medial release was performed to facilitate exposure and soft tissue balance throughout the procedure. A 10-mm distal femoral resection was performed using OrthAlign to navigate a neutral cut relative to mechanical axis of the femur. PCL was excised. Proximal tibial resection was performed using an extramedullary alignment guide. Menisci were excised.   Progressive medial release was performed, but the medial side was still a little bit tight compared to the lateral side, and I did not want to perform a complete release distally off the tibia. I re-cut the tibia with another degree or 2 of varus which produced a symmetrical extension gap with a 14-mm spacer block. Knee was placed in 90 degrees of flexion and gap  was inserted. Soft tissue tension was applied and block was adjusted to produce a 14-mm flexion space. Femoral rotation was drilled and the femur was sized and prepared for a size 9 component utilizing appropriate jig. Remaining posterior osteophytes were removed from the femur and subperiosteal posterior capsular release was performed. Trials were inserted with 14-mm insert in place and the knee had full extension to gravity with satisfactory coronal plane stability and soft tissue tension throughout arc of motion. Patella was prepared for 35 mm component and tracked centrally using no thumbs technique. The tourniquet had been released immediately after initial exposure, and at this point was re-inflated. Trials were removed and bony surfaces were copiously irrigated by pulse lavage and dried before the real components were cemented into place using antibiotic-impregnated cement. Excess cement was removed and knee was reduced in full extension with the real insert in place during cement setup. Periarticular soft tissues were injected with a solution containing 0.5% ropivacaine with epinephrine as well as clonidine and Toradol. Tourniquet was released. Wound was irrigated. The arthrotomy was closed with a combination of heavy Vicryl sutures and a running #2 Stratafix suture and subcutaneous layers were closed in layered fashion with Vicryl and a running Monocryl subcuticular stitch. Wound was dressed with Dermabond and an Aquacel occlusive dressing as well as sterile compressive dressing.   The patient's bladder was decompressed with straight catheterization before he was transported to the postanesthesia care unit in stable condition. All counts were correct at the end of the procedure. The physician assistant was critical throughout the procedure to assist with patient positioning, retraction, and closure.   There were no [employer] residents or fellows available to Faith Velazquez MD      [id number]  D:  03/14/2023 23:29  T:  03/15/2023 6:10  JOB #:  [id number]  CC:  Srikanth Vazquez MD

## 2023-03-15 NOTE — PROGRESS NOTES
Transition of Care: Plan for discharge home with spouse and HH. [facility] [address] has accepted patient. Patient owns RW. Family will transport patient home at discharge. The Plan for Transition of Care is related to the following treatment goals: home health, prefers [facility] [address]    The Patient and/or patient representative  was provided with a choice of provider and agrees   with the discharge plan. [x] Yes [] No    Freedom of choice list was provided with basic dialogue that supports the patient's individualized plan of care/goals, treatment preferences and shares the quality data associated with the providers.  [x] Yes [] No    MARIA ESTHER Fox/BLANCA

## 2023-03-15 NOTE — PROGRESS NOTES
Problem: Mobility Impaired (Adult and Pediatric)  Goal: *Acute Goals and Plan of Care (Insert Text)  Description: FUNCTIONAL STATUS PRIOR TO ADMISSION: Patient was independent without use of DME.    HOME SUPPORT PRIOR TO ADMISSION: The patient lived with wife  but did not require assist.    Physical Therapy Goals  Initiated 3/14/2023    1. Patient will move from supine to sit and sit to supine  and scoot up and down in bed with modified independence within 4 days. 2. Patient will perform sit to stand with modified independence within 4 days. 3. Patient will ambulate with modified independence for > 150 feet with the least restrictive device within 4 days. 4. Patient will ascend/descend 4 stairs with one handrail(s) with supervision within 4 days. 5. Patient will perform home exercise program per protocol with supervision/set-up within 4 days. 6. Patient will demonstrate AROM 0-90 degrees in operative joint within 4 days. Outcome: Resolved/Met   PHYSICAL THERAPY TREATMENT/DISCHARGE  Patient: Sulma Singer (58 y.o. male)  Date: 3/15/2023  Diagnosis: Primary osteoarthritis of left knee [M17.12] <principal problem not specified>  Procedure(s) (LRB):  LEFT TOTAL KNEE ARTHROPLASTY (Left) 1 Day Post-Op  Precautions: WBAT, Fall  Chart, physical therapy assessment, plan of care and goals were reviewed. ASSESSMENT  Patient continues with skilled PT services and has met acute care PT goals. Patient is cleared for discharge from PT standpoint. Patient  is independent with post-op TKA exercise protocol and has same in written, illlustrated form. PT Discharge instructions reviewed. Patient and wife demonstrated understanding and deferred Discharge Video as they saw it after last surgery. Barthel Functional Score has improved to 90/100, indicating minimal impaired ability to care for basic self-needs/dependency on others. Other factors to consider for discharge:  Motivated/A & O x 4/Supportive Family/Independent PLOF          PLAN :  Patient will be discharged from acute skilled physical therapy at this time. Rationale for discharge:  Goals achieved    Recommendation for discharge: (in order for the patient to meet his/her long term goals)  Physical therapy at least 2 days/week in the home  (appropriate for OutPatient if Home Health not available)    This discharge recommendation:  Has been made in collaboration with the attending provider and/or case management    IF patient discharges home will need the following DME: patient owns DME required for discharge       SUBJECTIVE:   Patient stated I am ready to go.     OBJECTIVE DATA SUMMARY:   Critical Behavior:  Neurologic State: Alert  Orientation Level: Oriented X4  Cognition: Appropriate decision making, Follows commands  Safety/Judgement: Awareness of environment, Good awareness of safety precautions  Functional Mobility Training:  Bed Mobility:     Supine to Sit: Modified independent  Sit to Supine: Modified independent  Scooting: Modified independent        Transfers:  Sit to Stand: Modified independent  Stand to Sit: Modified independent                             Balance:  Sitting: Intact  Standing: Intact; With support  Standing - Static: Good  Standing - Dynamic : Good;Constant support  Ambulation/Gait Training:  Distance (ft): 150 Feet (ft)  Assistive Device: Gait belt;Walker, rolling  Ambulation - Level of Assistance: Modified independent        Gait Abnormalities: Antalgic     Left Side Weight Bearing: As tolerated  Base of Support: Shift to right  Stance: Left decreased  Speed/Erin: Slow  Step Length: Right shortened  Swing Pattern: Left asymmetrical                 Stairs:  Number of Stairs Trained: 4  Stairs - Level of Assistance: Modified independent   Rail Use: Right  (Right Rail and Cane)    Therapeutic Exercises:   Patient  is independent with post-op TKA exercise protocol and has same in written, illlustrated form.       Barthel Index:    Bathin  Bladder: 10  Bowels: 10  Groomin  Dressin  Feeding: 10  Mobility: 15  Stairs: 10  Toilet Use: 10  Transfer (Bed to Chair and Back): 15  Total: 90/100       The Barthel ADL Index: Guidelines  1. The index should be used as a record of what a patient does, not as a record of what a patient could do. 2. The main aim is to establish degree of independence from any help, physical or verbal, however minor and for whatever reason. 3. The need for supervision renders the patient not independent. 4. A patient's performance should be established using the best available evidence. Asking the patient, friends/relatives and nurses are the usual sources, but direct observation and common sense are also important. However direct testing is not needed. 5. Usually the patient's performance over the preceding 24-48 hours is important, but occasionally longer periods will be relevant. 6. Middle categories imply that the patient supplies over 50 per cent of the effort. 7. Use of aids to be independent is allowed. Score Interpretation (from [address] [address])    Independent   60-79 Minimally independent   40-59 Partially dependent   20-39 Very dependent   <20 Totally dependent     -Brenton Doherty., Barthel, D.W. (1965). Functional evaluation: the Barthel Index. [address] [facility] ([address] (1997). The Barthel activities of daily living index: self-reporting versus actual performance in the old (> or = 75 years). Journal [address] 45(7), [facility], J..M., [facility]., Pauline Ravikinson. (1999). Measuring the change in disability after inpatient rehabilitation; comparison of the responsiveness of the Barthel Index and Functional [place] Measure. Journal of Neurology, Neurosurgery, and Psychiatry, 66(4), 143-767.   -Sarwat Pink, N.J.A, Oneil Brantley  WCherelleJ.MARCELINO, & Bette Rollins M.A. (2004) Assessment of post-stroke quality of life in cost-effectiveness studies: The usefulness of the Barthel Index and the EuroQoL-5D. Quality of Life Research, 13, 427-43        Pain Ratin/10    Activity Tolerance:   Good    After treatment patient left in no apparent distress:   Sitting in chair, Call bell within reach, Caregiver / family present, and nurse notified. COMMUNICATION/COLLABORATION:   The patients plan of care was discussed with: Occupational therapist, Registered nurse, Physician, and Case management.      Bipin Dumas   Time Calculation: 25 mins

## 2023-03-15 NOTE — PROGRESS NOTES
I have reviewed discharge instructions with the patient and spouse. The patient and spouse verbalized understanding. Pt provided with discharge instructions and information sheet. No further questions at this time.

## 2023-03-15 NOTE — PROGRESS NOTES
Ortho NP Note    POD# 1  s/p LEFT TOTAL KNEE ARTHROPLASTY   Pt seen with wife present. Pt standing in room with walker. Patient reports pain remains minimal using primarily tylenol and toradol overnight. He is hopeful for morning discharge to home with home health. Denies N/V, ate breakfast, drinking fluids. No CP, no SOB. VSS Afebrile. Visit Vitals  BP (!) 141/72 (BP 1 Location: Left upper arm)   Pulse 85   Temp 98.1 °F (36.7 °C)   Resp 16   Ht 5' 11\" (1.803 m)   Wt 105.7 kg (233 lb)   SpO2 99%   BMI 32.50 kg/m²       Voiding status: spontaneous void   Output (mL)  Urine Voided: 400 ml (03/15/23 0308)  Last Bowel Movement Date: 03/13/23 (03/14/23 1730)      Labs    Lab Results   Component Value Date/Time    HGB 14.7 03/15/2023 03:07 AM      Lab Results   Component Value Date/Time    INR 1.0 03/06/2023 10:10 AM      Lab Results   Component Value Date/Time    Sodium 138 03/15/2023 12:20 AM    Potassium 4.5 03/15/2023 12:20 AM    Chloride 107 03/15/2023 12:20 AM    CO2 22 03/15/2023 12:20 AM    Glucose 190 (H) 03/15/2023 12:20 AM    BUN 23 (H) 03/15/2023 12:20 AM    Creatinine 1.54 (H) 03/15/2023 12:20 AM    Calcium 7.9 (L) 03/15/2023 12:20 AM     Recent Glucose Results:   Lab Results   Component Value Date/Time     (H) 03/15/2023 12:20 AM    GLUCPOC 153 (H) 03/15/2023 06:16 AM    GLUCPOC 244 (H) 03/14/2023 10:01 PM    GLUCPOC 193 (H) 03/14/2023 11:22 AM           Body mass index is 32.5 kg/m². : A BMI > 30 is classified as obesity and > 40 is classified as morbid obesity. Aquacel dressing to left knee c.d.i  Cryotherapy in place over incision  Calves soft and supple; No pain with passive stretch  Bilateral LEs warm, dry. 2+ DP pulses. Sensation and motor intact - PF/DF/EHL intact 5/5  Foot Pumps for mechanical DVT proph while in bed     PLAN: Patient seen following therapy clearance.   I have reviewed progress note and am in agreement with assessment and plan as documented by [facility] Abelino CURRIE. In preparation for discharged, reviewed the following in room with patient. 1) PT: BID WBAT. CM arranged HH with [facility] [address] after discharge  2) Anticoagulation:  Aspirin 81 mg PO BID for DVT Prophylaxis. Encouraged early mobilization, bed exercises, and SCD use. 3) Pain - Multimodal approach including cryotherapy, scheduled Tylenol, discontinued toradol d/t Cr bump, with  PRN oxycodone & IV dilaudid. To be discharged with oxycodone, tylenol rx--[facility].  Discussed precautions for narcotic use: avoid driving, ETOH, other sedating medications. 4) Elevated Cr: Cr on 3/6: 1.27. Cr on POD 1: 1.54. Stopped toradol. IV fluids thru discharge. Encouraged PO water intake after D/C.    5) HTN: Current /72. Resume home lisinopril/HCTZ. Routine VS.  6) T2DM: Accuchecks, consistent carb diet. Sliding scale lispro, resume home regimen at discharge. 7) Post op care: Continue bowel regimen, encouraged IS. Follow up in 3-4 weeks with Dr Ava Montoya. Aquacel to remain in place x 7 days unless integrity is lost.   8) Readiness for discharge:     [x] Vital Signs stable    [x] + Voiding    [x] Wound intact, drainage minimal    [x] Tolerating PO intake     [x] Cleared by PT (OT if applicable)     [] Stair training completed (if applicable)    [] Independent / Contact Guard Assist (household distance)     [] Bed mobility     [] Car transfers     [] ADLs    [x] Adequate pain control on oral medication alone     Discharge to home with home health and family support today.     Wes De Leon NP  Available via [employer]

## 2023-03-16 ENCOUNTER — PATIENT OUTREACH (OUTPATIENT)
Dept: CASE MANAGEMENT | Age: 68
End: 2023-03-16

## 2023-03-16 NOTE — PROGRESS NOTES
Care Transitions Initial Call    Call within 2 business days of discharge: Yes     Patient Current Location: [place]    Patient: Charles Deras Patient : 1955 MRN: [id number]    Last Discharge  [address]       Date Complaint Diagnosis Description Type Department Provider    3/14/23  Primary osteoarthritis of left knee Admission (Discharged) Yaakov Spurling, MD            Was this an external facility discharge? No Discharge Facility: [facility]     Challenges to be reviewed by the provider   Additional needs identified to be addressed with provider: no  none             Method of communication with provider : none        Advance Care Planning:   Does patient have an Advance Directive: decision makers updated during hospitalization. Inpatient Readmission Risk score: No data recorded  Was this a readmission? no   Patient stated reason for the admission: [address]  Patient had previous RTKA. Patients top risk factors for readmission:  Post op complications    Interventions to address risk factors: Scheduled appointment with Specialist-23 and Obtained and reviewed discharge summary and/or continuity of care documents    Care Transition Nurse (CTN) contacted the patient by telephone to perform post hospital discharge assessment. Verified name and  with patient as identifiers. Provided introduction to self, and explanation of the CTN role. CTN reviewed discharge instructions, medical action plan and red flags with patient who verbalized understanding. Were discharge instructions available to patient? yes. Reviewed appropriate site of care based on symptoms and resources available to patient including: Specialist. Patient given an opportunity to ask questions and does not have any further questions or concerns at this time. The patient agrees to contact the Ortho office for questions related to their healthcare.      Medication reconciliation was performed with patient, who verbalizes understanding of administration of home medications. Advised obtaining a 90-day supply of all daily and as-needed medications. Referral to Pharm D needed: no     Home Health/Outpatient orders at discharge: home health care and [address] [place] [address]  Date of initial visit: TBD    Durable Medical Equipment ordered at discharge: None  [address] received: n/a    Was patient discharged with a pulse oximeter? no    Discussed follow-up appointments. If no appointment was previously scheduled, appointment scheduling offered:  appointment scheduled previously . Is follow up appointment scheduled within 7 days of discharge? Post op follow up appointment 4/7/23 . [facility] appointment(s):   Future Appointments   Date Time Provider Tamika Graf   4/7/2023  9:00 AM Roxanna Crespo PA-C [facility] BS AMB   8/1/2023 10:00 AM Wilbert Sandoval MD [facility] BS AMB     Non-[facility] follow up appointment(s): n/a    Plan for follow-up call in 5-7 days based on severity of symptoms and risk factors. Plan for next call: community resources-HH visit and pain management  CTN provided contact information for future needs.      Goals Addressed    None

## 2023-03-16 NOTE — PROGRESS NOTES
[facility]  [facility] Orthopaedic Pathway Handoff     FROM:                                TO: At [address]                                                      ([address] [place] [address] or Facility name)  [place] [address]  Dept: [address]: [phone]                                      Room#:  554/01                                                       Nurse Navigator: Nano Lopez RN         SITUATION      ASAScore: ASA 2 - Patient with mild systemic disease with no functional limitations    Admitted:  3/14/2023  Hospital Day: 2      Attending Provider:  No att. providers found     Consultations:  None    PCP:  John Crowley MD   [phone]     Admitting Dx:  Primary osteoarthritis of left knee [M17.12]       Active Problems:    Primary osteoarthritis of left knee (3/14/2023)      2 Days Post-Op of   Procedure(s):  LEFT TOTAL KNEE ARTHROPLASTY   BY: Devonte Rubio MD             ON: 3/14/2023                  Code Status: Prior             Advance Directive? No Doesnt Have (Send w/patient)     Isolation:  There are currently no Active Isolations       MDRO: No current active infections    BACKGROUND     Allergies:  No Known Allergies    Past Medical History:   Diagnosis Date    Arthralgia of knee 07/15/2013    Arthritis     Diabetes ([facility] [address]     type 2    Diverticular disease of left colon 03/22/2013    GERD (gastroesophageal reflux disease)     Gout 12/06/1985    Hypertension     SLIGHTLY PER PT'S PCP       Past Surgical History:   Procedure Laterality Date    HX COLONOSCOPY  2000    Dr. Yolande Angel- Vivi    HX HEENT      deviated septum age 12    HX KNEE REPLACEMENT Right 02/10/2022    HX ORTHOPAEDIC  1974    REPAIR OF CARTILAGE IN RT KNEE JOINT.     HX OTHER SURGICAL  01/21/2016    excision of lymph node right axilla    HX TONSILLECTOMY      child       Prior to Admission Medications   Prescriptions Last Dose Informant Patient Reported? Taking?   atorvastatin (LIPITOR) 10 mg tablet 3/11/2023  No No   Sig: TAKE 1 TABLET BY MOUTH EVERY DAY   Patient taking differently: Take 10 mg by mouth every evening. TAKE 1 TABLET BY MOUTH EVERY DAY   colchicine 0.6 mg tablet 12/14/2022  No No   Sig: TAKE 1 TABLET BY MOUTH TWICE A DAY FOR 10 DAYS   Patient taking differently: Take 0.6 mg by mouth as needed. lisinopril-hydroCHLOROthiazide (PRINZIDE, ZESTORETIC) 20-25 mg per tablet 3/11/2023  No No   Sig: Take 1 Tablet by mouth daily. metFORMIN ER (GLUCOPHAGE XR) 500 mg tablet 3/11/2023  No No   Sig: TAKE 3 TABLETS BY MOUTH EVERY DAY WITH DINNER   Patient taking differently: Take 500 mg by mouth two (2) times a day. TAKE 3 TABLETS BY MOUTH EVERY DAY WITH DINNER   sildenafil citrate (Viagra) 100 mg tablet Unknown  No No   Sig: Take 1 Tablet by mouth daily as needed for Erectile Dysfunction. Facility-Administered Medications: None       Vaccinations:    Immunization History   Administered Date(s) Administered    COVID-19, J&J, (age 18y+), IM, 0.5mL 03/01/2021, 03/21/2021    COVID-19, MODERNA BLUE border, Primary or Immunocompromised, (age 18y+), IM, 100 mcg/0.5mL 10/25/2021    Influenza Vaccine 11/18/2013    Influenza, FLUAD, (age 72 y+), Adjuvanted 10/21/2021, 01/31/2023    Influenza, FLUARIX, FLULAVAL, FLUZONE (age 10 mo+) AND AFLURIA, (age 1 y+), PF, 0.5mL 12/02/2014, 12/02/2015, 02/02/2018, 02/13/2019    Pneumococcal Polysaccharide (PPSV-23) 07/15/2013    Tdap 03/22/2013    Zoster Vaccine, Live 06/02/2015         ASSESSMENT   Age: 75 y.o. Gender: male        Height: Height: 5' 11\" (180.3 cm)                    Weight:Weight: 105.7 kg (233 lb)     No data found. Active Orders   There are no active orders of the following types: Diet.        Orientation: Orientation Level: Oriented X4    Active Lines/Drains:  (Peg Tube / Montero / CL or S/L?):no    Urinary Status: Has not voided (straight cathed 1255pm in surgery)      Last BM: Last Bowel Movement Date: 03/13/23     Skin Integrity: Incision (comment)             Mobility: Slightly limited   Weight Bearing Status: WBAT (Weight Bearing as Tolerated)      Gait Training  Assistive Device: Gait belt, Walker, rolling  Ambulation - Level of Assistance: Modified independent  Distance (ft): 150 Feet (ft)  Stairs - Level of Assistance: Modified independent  Number of Stairs Trained: 4  Rail Use: Right  (Right Rail and Cane)     On Anticoagulation? YES  Aspirin                                         Pain Medications given:  Oxycodone, Tylenol                                   Lab Results   Component Value Date/Time    Glucose 190 (H) 03/15/2023 12:20 AM    Hemoglobin A1c 6.5 (H) 01/31/2023 03:10 PM    INR 1.0 03/06/2023 10:10 AM    INR 1.0 01/13/2022 08:59 AM    HGB 14.7 03/15/2023 03:07 AM    HGB 11.3 (L) 03/15/2023 12:20 AM    HGB 14.3 03/06/2023 10:10 AM    HGB 10.6 (L) 02/11/2022 12:10 AM       Readmission Risks:  Score:         RECOMMENDATION     See After Visit Summary (AVS) for:  Discharge instructions  After [address]   Medication Reconciliation          [facility] Orthopaedic Nurse Navigator  JAMAL Ace, RN      Office  [phone]  Fax      [phone]  [email or web address]             . Phy

## 2023-03-22 ENCOUNTER — PATIENT OUTREACH (OUTPATIENT)
Dept: CASE MANAGEMENT | Age: 68
End: 2023-03-22

## 2023-03-22 NOTE — PROGRESS NOTES
Care Transitions Follow Up Call    Patient Current Location: [place]    Challenges to be reviewed by the provider   Additional needs identified to be addressed with provider: no  none           Method of communication with provider : none    Care Transition Nurse (CTN) contacted the patient by telephone to follow up after admission on 3/14/23. Verified name and  with patient as identifiers. Addressed changes since last contact: home health care- initial visit 3/17/23  Follow up appointment completed? no.   Was follow up appointment scheduled within 7 days of discharge? Surgical follow up 23 per discharge instructions . Advance Care Planning:   Does patient have an Advance Directive:  currently not on file; education provided     CTN reviewed discharge instructions, medical action plan and red flags with patient and discussed any barriers to care and/or understanding of plan of care after discharge. Discussed appropriate site of care based on symptoms and resources available to patient including: Specialist. The patient agrees to contact the PCP office for questions related to their healthcare. Patients top risk factors for readmission: medical condition-LTKA    Interventions to address risk factors: Communication with home health agencies or other community services the patient is currently using-Spoke with [facility] 3/16/23    [facility] follow up appointment(s):   Future Appointments   Date Time Provider Tamika Graf   2023  9:00 AM Janice Prieto PA-C [facility] BS AMB   2023 10:00 AM Nadir Sandoval MD [facility] BS AMB     Non-[facility] follow up appointment(s): n/a    CTN provided contact information for future needs. Plan for follow-up call in 10-14 days based on severity of symptoms and risk factors. Plan for next call: follow up appointment-23       Goals Addressed                   This Visit's Progress     Prevent complications post hospitalization.         Patient will attend follow up with Ortho on 4/7/23. Patient understands to call Dr. Israel Shadow nurse with any questions or concerns. At [facility] PT/OT-office will call to schedule initial visit. Discussed red flags per discharge instructions. Will discuss ACP at next outreach. Discussed ACP- wife is HCDM as completed during hospitalization.  [address] PT 2-3 x/wk per discharge instructions and SN (last visit 3/22/23 SN).

## 2023-03-28 DIAGNOSIS — Z96.651 STATUS POST TOTAL KNEE REPLACEMENT, RIGHT: Primary | ICD-10-CM

## 2023-04-04 ENCOUNTER — HOSPITAL ENCOUNTER (OUTPATIENT)
Dept: PHYSICAL THERAPY | Age: 68
End: 2023-04-04
Payer: MEDICARE

## 2023-04-04 PROCEDURE — 97161 PT EVAL LOW COMPLEX 20 MIN: CPT

## 2023-04-04 PROCEDURE — 97110 THERAPEUTIC EXERCISES: CPT

## 2023-04-04 NOTE — THERAPY EVALUATION
[address]  [place], [address]  Ph: [phone]    Fax: [phone]    Plan of Care/Statement of Necessity for Physical Therapy Services  2-15    Patient name: Jerline Angelucci  : 1955  Provider#: [id number]  Referral source: Kingsley Antonio MD      Medical/Treatment Diagnosis: Status post total knee replacement, left [Z96.652]     Prior Hospitalization: see medical history     Comorbidities: see medical history  Prior Level of Function: Independent with all ADL's; no use of AD  Medications: Verified on Patient Summary List  Start of Care: 2023      Onset Date: 2023   The Plan of Care and following information is based on the information from the initial evaluation. Assessment/ key information:   Patient is a 74 yo male who presents s/p L TKA on 2023. He has received home health services for 3 weeks making good progress. He is ambulating with a SPC currently and some without in the home. His ROM is still some limited with AROM -8 to 85 currently. He has minimal swelling and pain currently. We started HEP to improve ROM and begin some strengthening. He will benefit from skilled PT services to improve ability to his ROM and increase strength to be able to perform daily tasks .       Evaluation Complexity History LOW Complexity : Zero comorbidities / personal factors that will impact the outcome / POC; Examination LOW Complexity : 1-2 Standardized tests and measures addressing body structure, function, activity limitation and / or participation in recreation  ;Presentation LOW Complexity : Stable, uncomplicated  ;Clinical Decision Making TUG Score: 16 seconds  Overall Complexity Rating: LOW     Problem List: pain affecting function, decrease ROM, decrease strength, edema affecting function, impaired gait/ balance, decrease ADL/ functional abilitiies, decrease activity tolerance, decrease flexibility/ joint mobility, and decrease transfer abilities   Treatment Plan may include any combination of the following: Therapeutic exercise, Neuromuscular reeducation, Manual therapy, Therapeutic activity, Self care/home management, Electric stim unattended , Vasopneumatic device, Gait training, and Ultrasound  Patient / Family readiness to learn indicated by: asking questions, trying to perform skills, and interest  Persons(s) to be included in education: patient (P)  Barriers to Learning/Limitations: None  Patient Goal (s):\" I want to get my knee working again. \"  Patient Self Reported Health Status: good  Rehabilitation Potential: good    Short Term Goals: To be accomplished in 7 treatments. Patient will be independent with HEP to improve carryover between treatment sessions. Patient will improve knee extension ROM to -4 deg to be able to walk with AD over a variety of even and uneven surfaces without LOB. Patient will improve LE strength to 4+/5 to be able to stand from a chair without UE for support. Long Term Goals: To be accomplished in 15 treatments. Patient will improve knee flexion ROM to 110 deg to be able to navigate stairs with reciprocal pattern without use of HR. Patient will be able to squat to pick objects off the floor with 5/5 strength to return to standing without pain. Patient will be able to stand to perform wood carving tasks without pain. Frequency / Duration: Patient to be seen 2-3 times per week for 15 treatments. Patient/ Caregiver education and instruction: self care, activity modification, and exercises    [x]  Plan of care has been reviewed with PTA        Certification Period: 4/4/2023 to 07/02/2023    Diannia Duane, PT, DPT 4/4/2023     ________________________________________________________________________    I certify that the above Therapy Services are being furnished while the patient is under my care. I agree with the treatment plan and certify that this therapy is necessary.     [facility] Signature:____________________  Date:____________Time: _________                                        Theta MD Chas  Please sign and return to:   [facility]  [address] [place] [address], [place]  Ph: [phone]    Fax: [phone]    Patient name: Javier [place]  : 1955  Provider#: [id number]

## 2023-04-04 NOTE — THERAPY EVALUATION
PT INITIAL EVALUATION NOTE - [facility] 2-15    Patient Name: Sirena Rainey  Date:2023  : 1955  [x]  Patient  Verified  Payor: [place] MEDICARE / Plan: [place] MEDICARE PART A & B / Product Type: Medicare /    In time: 8:25  Out time: 8:57  Total Treatment Time (min): 32  Total Timed Codes (min): 10  1:1 Treatment Time ( only): 10   Visit #: 1    Treatment Area: Status post total knee replacement, left [Z96.652]    SUBJECTIVE  Pain Level (0-10 scale): 2/10  Any medication changes, allergies to medications, adverse drug reactions, diagnosis change, or new procedure performed?: [] No    [x] Yes (see summary sheet for update)  Subjective:    Patient is a 74 yo male who presents s/p L TKA on 2023. He had home health services until 2023 where he did exercises and mobility. He reports that his incision has fully healed and that his swelling is lessening. He reports that he is sleeping well. PLOF: Independent with all ADL's; no use of AD  Mechanism of Injury: surgery for L TKA   Previous Treatment/Compliance: home health  Radiographs: none   What increases symptoms: prolonged walking   What decreases symptoms: ice, Tylenol   Work Hx: real estate appraising ; woodcarving   Living Situation: lives with his wife in a 2 story with 4 steps outside and 17 inside   Pt Goals: \" I want to get my knee working again. \"   Barriers: none   Motivation: Good  Substance use: None reported  Cognition: A&O x 4  Fall Assessment: TUG Test: 16 seconds  Past Medical History:  Past Medical History:   Diagnosis Date    Arthralgia of knee 07/15/2013    Arthritis     Diabetes ([address]     type 2    Diverticular disease of left colon 2013    GERD (gastroesophageal reflux disease)     Gout 1985    Hypertension     SLIGHTLY PER PT'S PCP     Past Surgical History:  Past Surgical History:   Procedure Laterality Date    HX COLONOSCOPY      Dr. Luc Trinidad    HX HEENT      deviated septum age 12    HX KNEE REPLACEMENT Right 02/10/2022    HX ORTHOPAEDIC  1974    REPAIR OF CARTILAGE IN RT KNEE JOINT. HX OTHER SURGICAL  01/21/2016    excision of lymph node right axilla    HX TONSILLECTOMY      child     Current Medications:  Current Outpatient Medications   Medication Instructions    acetaminophen (TYLENOL) 500 mg, Oral, EVERY 4 HOURS    aspirin delayed-release 81 mg, Oral, 2 TIMES DAILY    atorvastatin (LIPITOR) 10 mg tablet TAKE 1 TABLET BY MOUTH EVERY DAY    colchicine 0.6 mg tablet TAKE 1 TABLET BY MOUTH TWICE A DAY FOR 10 DAYS    lisinopril-hydroCHLOROthiazide (PRINZIDE, ZESTORETIC) 20-25 mg per tablet 1 Tablet, Oral, DAILY    metFORMIN ER (GLUCOPHAGE XR) 500 mg tablet TAKE 3 TABLETS BY MOUTH EVERY DAY WITH DINNER    senna-docusate (PERICOLACE) 8.6-50 mg per tablet 1 Tablet, Oral, 2 TIMES DAILY AS NEEDED    sildenafil citrate (VIAGRA) 100 mg, Oral, DAILY AS NEEDED        Allergies:  No Known Allergies    OBJECTIVE/EXAMINATION  Other Observations:  incision healed well and minimal redness; bruising medial thigh  Gait and Functional Mobility:  slight antalgic gait pattern with decrease knee flexion on LLE  Palpation: no tenderness to palpation    Hip:  Strength       Right Left        Flexion 5/5 4+/5       Knee:  Strength AROM PROM     Right Left Right Left Right Left    Flexion 5/5 4/5 112 85  90    Extension 5/5 4+/5 0 -8  -6   Ankle  Strength       Right Left        Dorsiflexion 5/5 5/5        Plantarflexion 5/5 5/5       *All strength measures are on a scale with 5 as a maximum, if a space is left blank it was not tested. All ROM measurements are measured in degrees. 10 min Therapeutic Exercise:  [x] See flow sheet :   Rationale: increase ROM and increase strength to improve the patients ability to ambulate with less reliance on UE for support.      With   [] TE   [] TA   [] neuro   [] other: Patient Education: [x] Provided HEP    [] Progressed/Changed HEP based on:   [] positioning   [] body mechanics   [] transfers [] heat/ice application    [] other:              Pain Level (0-10 scale) post treatment: 2/10    ASSESSMENT/Changes in Function:   [x]  See Plan of [address], [place], DPT 4/4/2023

## 2023-04-05 ENCOUNTER — PATIENT OUTREACH (OUTPATIENT)
Dept: CASE MANAGEMENT | Age: 68
End: 2023-04-05

## 2023-04-05 NOTE — PROGRESS NOTES
Care Transitions Follow Up Call    Patient Current Location: [place]    Challenges to be reviewed by the provider   Additional needs identified to be addressed with provider: no  none           Method of communication with provider : none    Care Transition Nurse (CTN) contacted the patient by telephone to follow up after admission on 3/14/23. Verified name and  with patient as identifiers. Addressed changes since last contact:  graduating from walker to cane  Follow up appointment completed? no.   Was follow up appointment scheduled within 7 days of discharge? Appointment 23 . Advance Care Planning:   Does patient have an Advance Directive:  currently not on file; education provided     CTN reviewed discharge instructions, medical action plan and red flags with patient and discussed any barriers to care and/or understanding of plan of care after discharge. Discussed appropriate site of care based on symptoms and resources available to patient including: Specialist. The patient agrees to contact the PCP office for questions related to their healthcare.      Patients top risk factors for readmission: medical condition-KA    Interventions to address risk factors: Obtained and reviewed discharge summary and/or continuity of care documents    [facility] follow up appointment(s):   Future Appointments   Date Time Provider Tamika Graf   2023  8:00 AM Nia Vogel, PT [facility]   2023  8:00 AM Alexus Simone, PTA [facility]   2023  9:00 AM AGA Mistry AMB   4/10/2023  8:00 AM Laurann Hunting R, PTA [facility]   2023  8:00 AM Laurann Hunting R, PTA [facility]   2023  8:00 AM Alexus Simone, PTA [facility]   2023  8:00 AM Laurann Hunting R, PTA [facility]   2023  8:00 AM Alexus Simone, PTA [facility]   2023  8:00 AM Laurann Hunting R, PTA [facility]   2023 10:00 AM MD KIRAN Graves BS AMB     Non-[facility] follow up appointment(s): n/a    CTN provided contact information for future needs. Plan for follow-up call in 10-14 days based on severity of symptoms and risk factors. Plan for next call: self management-graduation       Goals Addressed                   This Visit's Progress     Prevent complications post hospitalization. Patient will attend follow up with Ortho on 4/7/23. Patient understands to call Dr. Ronnie Clements nurse with any questions or concerns. At [facility] PT/OT-office will call to schedule initial visit. Discussed red flags per discharge instructions. Will discuss ACP at next outreach. Discussed ACP- wife is HCDM as completed during hospitalization.  [address] [facility] PT 2-3 x/wk per discharge instructions and SN (last visit 3/22/23 SN). [facility] Outpatient therapy. Currently ambulating with a cane. Upcoming surgical follow up appointment 4/7/23. Patient c/o left foot pain from injury will notify PT and Ortho at next visit.

## 2023-04-07 ENCOUNTER — OFFICE VISIT (OUTPATIENT)
Dept: ORTHOPEDIC SURGERY | Age: 68
End: 2023-04-07

## 2023-04-07 ENCOUNTER — APPOINTMENT (OUTPATIENT)
Dept: PHYSICAL THERAPY | Age: 68
End: 2023-04-07
Payer: MEDICARE

## 2023-04-14 ENCOUNTER — HOSPITAL ENCOUNTER (OUTPATIENT)
Dept: PHYSICAL THERAPY | Age: 68
Discharge: HOME | End: 2023-04-14
Payer: MEDICARE

## 2023-04-14 PROCEDURE — 97110 THERAPEUTIC EXERCISES: CPT

## 2023-04-14 PROCEDURE — 97016 VASOPNEUMATIC DEVICE THERAPY: CPT

## 2023-04-17 ENCOUNTER — HOSPITAL ENCOUNTER (OUTPATIENT)
Dept: PHYSICAL THERAPY | Age: 68
Discharge: HOME | End: 2023-04-17
Payer: MEDICARE

## 2023-04-17 PROCEDURE — 97110 THERAPEUTIC EXERCISES: CPT

## 2023-04-17 PROCEDURE — 97016 VASOPNEUMATIC DEVICE THERAPY: CPT

## 2023-04-17 NOTE — PROGRESS NOTES
PT DAILY TREATMENT NOTE - [facility] 2-15    Patient Name: Mikhail Do  Date:2023  : 1955  [x]  Patient  Verified  Payor: [place] MED[place]ARE / Plan: [place] MEDICARE PART A & B / Product Type: Medicare /    In time:8:06  Out time:9:05  Total Treatment Time (min): 59  Total Timed Codes (min): 49  1:1 Treatment Time ( [address] only): 52   Visit #:  6 of 10    Treatment Area: Status post total knee replacement, left [Z96.652]    SUBJECTIVE  Pain Level (0-10 scale): 2/10  Any medication changes, allergies to medications, adverse drug reactions, diagnosis change, or new procedure performed?: [x] No    [] Yes (see summary sheet for update)  Subjective functional status/changes:     \"I am doing good and the swelling is going down. \"    OBJECTIVE  Modality rationale: decrease edema, decrease inflammation, and decrease pain to improve the patients ability to perform daily tasks with less irritation in knee.     Min Type Additional Details    [] Estim: []Att   []Unatt        []TENS instruct                  []IFC  []Premod   []NMES                     []Other:  []w/US   []w/ice   []w/heat  Position:  Location:    []  Traction: [] Cervical       []Lumbar                       [] Prone          []Supine                       []Intermittent   []Continuous Lbs:  [] before manual  [] after manual  [] With heat  [] Simultaneously performed with E-Stim    []  Ultrasound: []Continuous   [] Pulsed                           []1MHz   []3MHz Location:  W/cm2:    []  Ice     []  heat  []  Ice massage Position:  Location:   10 [x]  Vasopneumatic Device  If using vaso (only need to measure limb vaso being performed on)      pre-treatment girth : 44.5 cm      post-treatment girth : 44 cm      measured at (landmark location) mid patella Pressure:       [] lo [x] med [] hi   Temperature: [x] lo [] med [] Hi    [x] With ice    [] Simultaneously performed with Estim   [x] Skin assessment post-treatment:  [x]intact [x]redness- no adverse reaction []redness - adverse reaction:     49 min Therapeutic Exercise:  [x] See flow sheet :   Rationale: increase ROM, increase strength, improve coordination, and improve balance to improve the patients ability to perform daily tasks with less restrictions in ROM. With   [x] TE   [] TA   [] neuro   [] other: Patient Education: [x] Review HEP    [] Progressed/Changed HEP based on:   [] positioning   [] body mechanics   [] transfers   [] heat/ice application    [] other:        Pain Level (0-10 scale) post treatment: 3/10    ASSESSMENT/Changes in Function:   The pt tolerated treatment well today. He is gaining some ROM with increased tolerance to stretches jovanni on the Biodex. He is still mostly limited in extension ROM still. We will continue focusing on ROM and strengthening to improve his ability to perform daily tasks with less restrictions. Patient will continue to benefit from skilled PT services to modify and progress therapeutic interventions, address functional mobility deficits, address ROM deficits, address strength deficits, analyze and address soft tissue restrictions, and analyze and cue movement patterns to attain remaining goals. [x]  See Plan of Care  []  See progress note/recertification  []  See Discharge Summary         Progress towards goals / Updated goals:  Short Term Goals: To be accomplished in 7 treatments. Patient will be independent with HEP to improve carryover between treatment sessions. MET  Patient will improve knee extension ROM to -4 deg to be able to walk with AD over a variety of even and uneven surfaces without LOB. Patient will improve LE strength to 4+/5 to be able to stand from a chair without UE for support. Long Term Goals: To be accomplished in 15 treatments. Patient will improve knee flexion ROM to 110 deg to be able to navigate stairs with reciprocal pattern without use of HR.    Patient will be able to squat to pick objects off the floor with 5/5 strength to return to standing without pain. Patient will be able to stand to perform wood carving tasks without pain.      PLAN  [x]  Upgrade activities as tolerated     [x]  Continue plan of care  []  Update interventions per flow sheet       []  Discharge due to:_  []  Other:_      Alan Nice, PT, DPT 4/17/2023

## 2023-04-19 ENCOUNTER — HOSPITAL ENCOUNTER (OUTPATIENT)
Dept: PHYSICAL THERAPY | Age: 68
Discharge: HOME | End: 2023-04-19
Payer: MEDICARE

## 2023-04-19 PROCEDURE — 97110 THERAPEUTIC EXERCISES: CPT

## 2023-04-19 PROCEDURE — 97016 VASOPNEUMATIC DEVICE THERAPY: CPT

## 2023-04-19 NOTE — PROGRESS NOTES
PT DAILY TREATMENT NOTE - [facility] 2-15    Patient Name: Rickey Fu  Date:2023  : 1955  [x]  Patient  Verified  Payor: [place] MEDICARE / Plan: [place] MEDICARE PART A & B / Product Type: Medicare /    In time:805 am  Out time:911 am  Total Treatment Time (min): 66  Total Timed Codes (min): 56  1:1 Treatment Time ( [address] only): 64   Visit #:  7 of 10/(30 days)    Treatment Area: Status post total knee replacement, left [Z96.652]    SUBJECTIVE  Pain Level (0-10 scale): 2/10  Any medication changes, allergies to medications, adverse drug reactions, diagnosis change, or new procedure performed?: [x] No    [] Yes (see summary sheet for update)  Subjective functional status/changes: \"Pt reports still sleeping in the recliner. \"    OBJECTIVE  Modality rationale: decrease edema, decrease inflammation, and decrease pain to improve the patients ability to increase functional knee motion    Min Type Additional Details    [] Estim: []Att   []Unatt        []TENS instruct                  []IFC  []Premod   []NMES                     []Other:  []w/US   []w/ice   []w/heat  Position:  Location:    []  Traction: [] Cervical       []Lumbar                       [] Prone          []Supine                       []Intermittent   []Continuous Lbs:  [] before manual  [] after manual  [] With heat  [] Simultaneously performed with E-Stim    []  Ultrasound: []Continuous   [] Pulsed                           []1MHz   []3MHz Location:  W/cm2:    []  Ice     []  heat  []  Ice massage Position:  Location:   10 [x]  Vasopneumatic Device  If using vaso (only need to measure limb vaso being performed on)      pre-treatment girth :45 cm       post-treatment girth : 44.7 cm      measured at (landmark location) mid patella Pressure:       [] lo [] med [x] hi   Temperature: [x] lo [] med [] Hi    [x] With ice  38  [] Simultaneously performed with Estim   [x] Skin assessment post-treatment:  [x]intact [x]redness- no adverse reaction       []redness - adverse reaction:     55 min Therapeutic Exercise:  [x] See flow sheet :   Rationale: increase ROM, increase strength, and improve coordination to improve the patients ability to increase knee motion and activity levels. With   [x] TE   [] TA   [] neuro   [] other: Patient Education: [x] Review HEP    [] Progressed/Changed HEP based on:   [] positioning   [] body mechanics   [] transfers   [] heat/ice application    [] other:      Pain Level (0-10 scale) post treatment: 3/10    ASSESSMENT/Changes in Function:   The pt tolerated treatment working on functional strengthening with increased attention to TKE with ex cues to push quad contraction and control. Pt doing well with all ex visible decrease in knee stiffness with active motion as ex progress. Note changed biodex from PROM to isokinetic with cues to control motion due to prior issue. Game ready post ex with ext stretch. .   Patient will continue to benefit from skilled PT services to modify and progress therapeutic interventions, address functional mobility deficits, address ROM deficits, address strength deficits, and analyze and address soft tissue restrictions to attain remaining goals. [x]  See Plan of Care  []  See progress note/recertification  []  See Discharge Summary         Progress towards goals / Updated goals:  Short Term Goals: To be accomplished in 7 treatments. Patient will be independent with HEP to improve carryover between treatment sessions. MET  Patient will improve knee extension ROM to -4 deg to be able to walk with AD over a variety of even and uneven surfaces without LOB. Patient will improve LE strength to 4+/5 to be able to stand from a chair without UE for support. Long Term Goals: To be accomplished in 15 treatments. Patient will improve knee flexion ROM to 110 deg to be able to navigate stairs with reciprocal pattern without use of HR.    Patient will be able to squat to pick objects off the floor with 5/5 strength to return to standing without pain. Patient will be able to stand to perform wood carving tasks without pain.         PLAN  [x]  Upgrade activities as tolerated     [x]  Continue plan of care  []  Update interventions per flow sheet       []  Discharge due to:_  []  Other:_      Thomasene Homans, PTA, ANGELA 4/19/2023

## 2023-04-21 ENCOUNTER — APPOINTMENT (OUTPATIENT)
Dept: PHYSICAL THERAPY | Age: 68
End: 2023-04-21
Payer: MEDICARE

## 2023-04-24 ENCOUNTER — APPOINTMENT (OUTPATIENT)
Dept: PHYSICAL THERAPY | Age: 68
End: 2023-04-24
Payer: MEDICARE

## 2023-04-25 ENCOUNTER — PATIENT OUTREACH (OUTPATIENT)
Dept: CASE MANAGEMENT | Age: 68
End: 2023-04-25

## 2023-04-25 NOTE — PROGRESS NOTES
Patient has graduated from the Transitions of Care Coordination  program on 04/25/23 . Patient/family has the ability to self-manage at this time Care management goals have been completed. Patient was not referred to the [facility] team for further management. Goals Addressed                   This Visit's Progress     COMPLETED: Prevent complications post hospitalization. Patient will attend follow up with Ortho on 4/7/23. Patient understands to call Dr. Tamia Terrell nurse with any questions or concerns. At [facility] PT/OT-office will call to schedule initial visit. Discussed red flags per discharge instructions. Will discuss ACP at next outreach. Discussed ACP- wife is HCDM as completed during hospitalization.  [address] PT 2-3 x/wk per discharge instructions and SN (last visit 3/22/23 SN). [facility] Outpatient therapy. Currently ambulating with a cane. Upcoming surgical follow up appointment 4/7/23. Patient c/o left foot pain from injury will notify PT and Ortho at next visit. Attended follow up appointments. Ambulating without cane. Patient has Care Transition Nurse's contact information for any further questions, concerns, or needs.   Patients upcoming visits:    Future Appointments   Date Time Provider Tamika Graf   5/1/2023  8:30 AM Thomasena Lundborg, PT [facility]   5/3/2023  8:00 AM Elke Connor, PT [facility]   5/4/2023  8:00 AM Luciano Olvera PTA [facility]   5/5/2023  9:30 AM Nora Sullivan MD [facility]   8/1/2023 10:00 AM Lima Juan MD [facility] AMB

## 2023-05-01 ENCOUNTER — APPOINTMENT (OUTPATIENT)
Facility: HOSPITAL | Age: 68
End: 2023-05-01
Payer: MEDICARE

## 2023-05-01 ENCOUNTER — HOSPITAL ENCOUNTER (OUTPATIENT)
Dept: PHYSICAL THERAPY | Age: 68
Discharge: HOME | End: 2023-05-01
Payer: MEDICARE

## 2023-05-01 PROCEDURE — 97110 THERAPEUTIC EXERCISES: CPT

## 2023-05-01 PROCEDURE — 9990 CHARGE CONVERSION

## 2023-05-01 NOTE — THERAPY DISCHARGE
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 Patient has demonstrated overall progress in strength, rom, pain level, and functional mobility. Pt is currently completing all work related tasks, walking long distances, squatting to get objects off ground, navigating stairs with step over gait pattern, ambulating with no AD, and completing all ADLs without difficulty. Home exercise program reviewed today with no concerns voiced by patient. Patient to be discharged at this time secondary to all goals met and current level of functional mobility. Thank you for this referral.    Progress towards goals / Updated goals:  Short Term Goals: To be accomplished in 7 treatments. Patient will be independent with HEP to improve carryover between treatment sessions. MET  Patient will improve knee extension ROM to -4 deg to be able to walk with AD over a variety of even and uneven surfaces without LOB. met  Patient will improve LE strength to 4+/5 to be able to stand from a chair without UE for support. met     Long Term Goals: To be accomplished in 15 treatments.   Patient will improve knee flexion ROM to 110 deg to be able to navigate stairs with reciprocal pattern without use of HR. met  Patient will be able to squat to pick objects off the floor with 5/5 strength to return to standing without pain. met  Patient will be able to stand to perform wood carving tasks without pain.  met         RECOMMENDATIONS:  [x]Discontinue therapy: [x]Patient has reached or is progressing toward set goals     []Patient is non-compliant or has abdicated     []Due to lack of appreciable progress towards set goals     []Other    Therese Copeland, PT, DPT  5/1/2023

## 2023-05-01 NOTE — PROGRESS NOTES
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 \"    OBJECTIVE  Posture: Good  Other Observations: n/a  Gait and Functional Mobility: Gait with no AD  Palpation: right knee with healed incision site. Hip:   Strength           Right Left           Flexion 5/5 5/5           Extension 5/5 5/5           Abduction 5/5 5/5           Adduction 5/5 5/5         Knee:   Strength AROM/ PROM         Right Left Right Right       Flexion 5/5 5/5 120        Extension 5/5 4/5 0      Ankle   Strength           Right Left           Dorsiflexion 5/5 5/5           Plantarflexion 5/5 5/5           Inversion 5/5 5/5           Eversion 5/5 5/5         *All strength measures are on a scale with 5 as a maximum, if a space is left blank it was not tested. All ROM measurements are measured in degrees. 30 min Therapeutic Exercise:  [x] See flow sheet :   Rationale: increase ROM, increase strength, and improve coordination to improve the patients ability to increase knee motion and activity levels.      With   [x] TE   [] TA   [] neuro   [] other: Patient Education: [x] Review HEP    [] Progressed/Changed HEP based on:   [] positioning   [] body mechanics   [] transfers   [] heat/ice application    [] other:      Pain Level (0-10 scale) post treatment: 0/10    ASSESSMENT/Changes in Function:  Patient has received skilled physical therapy interventions including strengthening, tibiofemoral joint mobility, rom, endurance, strengthening, balance, and functional and work related activities. Patient has demonstrated overall progress in strength, rom, pain level, and functional mobility. Pt is currently completing all work related tasks, walking long distances, squatting to get objects off ground, navigating stairs with step over gait pattern, ambulating with no AD, and completing all ADLs without difficulty. Home exercise program reviewed today with no concerns voiced by patient. Patient to be discharged at this time secondary to all goals met and current level of functional mobility. Thank you for this referral.     []  See Plan of Care  []  See progress note/recertification  [x]  See Discharge Summary         Progress towards goals / Updated goals:  Short Term Goals: To be accomplished in 7 treatments. Patient will be independent with HEP to improve carryover between treatment sessions. MET  Patient will improve knee extension ROM to -4 deg to be able to walk with AD over a variety of even and uneven surfaces without LOB. met  Patient will improve LE strength to 4+/5 to be able to stand from a chair without UE for support. met     Long Term Goals: To be accomplished in 15 treatments. Patient will improve knee flexion ROM to 110 deg to be able to navigate stairs with reciprocal pattern without use of HR. met  Patient will be able to squat to pick objects off the floor with 5/5 strength to return to standing without pain. met  Patient will be able to stand to perform wood carving tasks without pain.  met        PLAN  []  Upgrade activities as tolerated     []  Continue plan of care  []  Update interventions per flow sheet       [x]  Discharge due to:_ current level of function  []  Other:_      Katie Hernandez PT, DPT 5/1/2023

## 2023-05-03 ENCOUNTER — APPOINTMENT (OUTPATIENT)
Dept: PHYSICAL THERAPY | Age: 68
End: 2023-05-03
Payer: MEDICARE

## 2023-05-04 ENCOUNTER — APPOINTMENT (OUTPATIENT)
Dept: PHYSICAL THERAPY | Age: 68
End: 2023-05-04
Payer: MEDICARE

## 2023-05-05 ENCOUNTER — OFFICE VISIT (OUTPATIENT)
Dept: ORTHOPEDIC SURGERY | Age: 68
End: 2023-05-05
Payer: MEDICARE

## 2023-05-05 VITALS — BODY MASS INDEX: 31.5 KG/M2 | HEIGHT: 71 IN | WEIGHT: 225 LBS

## 2023-05-05 DIAGNOSIS — Z09 SURGERY FOLLOW-UP: ICD-10-CM

## 2023-05-05 DIAGNOSIS — Z96.652 STATUS POST TOTAL KNEE REPLACEMENT, LEFT: Primary | ICD-10-CM

## 2023-05-05 PROCEDURE — 99024 POSTOP FOLLOW-UP VISIT: CPT | Performed by: ORTHOPAEDIC SURGERY

## 2023-05-17 RX ORDER — SILDENAFIL 100 MG/1
100 TABLET, FILM COATED ORAL DAILY PRN
COMMUNITY
Start: 2022-08-22

## 2023-05-17 RX ORDER — ATORVASTATIN CALCIUM 10 MG/1
1 TABLET, FILM COATED ORAL DAILY
COMMUNITY
Start: 2022-09-16 | End: 2023-07-05

## 2023-05-17 RX ORDER — LISINOPRIL AND HYDROCHLOROTHIAZIDE 25; 20 MG/1; MG/1
1 TABLET ORAL DAILY
COMMUNITY
Start: 2023-01-31 | End: 2023-07-05

## 2023-05-17 RX ORDER — ACETAMINOPHEN 500 MG
500 TABLET ORAL EVERY 4 HOURS
COMMUNITY
Start: 2023-03-14

## 2023-05-17 RX ORDER — METFORMIN HYDROCHLORIDE 500 MG/1
TABLET, EXTENDED RELEASE ORAL
COMMUNITY
Start: 2022-09-16 | End: 2023-06-29

## 2023-05-17 RX ORDER — COLCHICINE 0.6 MG/1
TABLET ORAL
COMMUNITY
Start: 2022-12-03

## 2023-05-17 RX ORDER — ASPIRIN 81 MG/1
81 TABLET ORAL 2 TIMES DAILY
COMMUNITY
Start: 2023-03-14

## 2023-06-29 RX ORDER — METFORMIN HYDROCHLORIDE 500 MG/1
TABLET, EXTENDED RELEASE ORAL
Qty: 90 TABLET | Refills: 1 | Status: SHIPPED | OUTPATIENT
Start: 2023-06-29

## 2023-07-05 RX ORDER — LISINOPRIL AND HYDROCHLOROTHIAZIDE 25; 20 MG/1; MG/1
TABLET ORAL
Qty: 90 TABLET | Refills: 1 | Status: SHIPPED | OUTPATIENT
Start: 2023-07-05

## 2023-07-05 RX ORDER — ATORVASTATIN CALCIUM 10 MG/1
TABLET, FILM COATED ORAL
Qty: 90 TABLET | Refills: 1 | Status: SHIPPED | OUTPATIENT
Start: 2023-07-05

## 2023-09-20 RX ORDER — METFORMIN HYDROCHLORIDE 500 MG/1
TABLET, EXTENDED RELEASE ORAL
Qty: 90 TABLET | Refills: 1 | Status: SHIPPED | OUTPATIENT
Start: 2023-09-20

## 2023-10-18 NOTE — TELEPHONE ENCOUNTER
Refill request received from [facility] for   Requested Prescriptions     Pending Prescriptions Disp Refills    colchicine (COLCRYS) 0.6 MG tablet 20 tablet 0     Sig: Take 1 tablet by mouth in the morning and at bedtime for 10 days     1/31/2023   Visit date not found     Routed to Dr George Erickson for review.

## 2023-10-19 RX ORDER — COLCHICINE 0.6 MG/1
0.6 TABLET ORAL 2 TIMES DAILY
Qty: 20 TABLET | Refills: 0 | Status: SHIPPED | OUTPATIENT
Start: 2023-10-19 | End: 2023-10-29

## 2023-10-19 RX ORDER — METFORMIN HYDROCHLORIDE 500 MG/1
TABLET, EXTENDED RELEASE ORAL
Qty: 270 TABLET | Refills: 1 | OUTPATIENT
Start: 2023-10-19

## 2023-10-19 NOTE — TELEPHONE ENCOUNTER
Chief Complaint   Patient presents with    Medication Refill       Requested Prescriptions     Pending Prescriptions Disp Refills    metFORMIN (GLUCOPHAGE-XR) 500 MG extended release tablet 270 tablet 1     Sig: TAKE 3 TABLETS BY MOUTH EVERY DAY WITH DINNER       Allergies:  Not on File    Last visit with clinic:  1/31/2023   Next visit with clinic: Visit date not found     Last visit with this provider: 1/31/2023   Next Visit with this provider: Visit date not found    Signed by Guicho ZUÑIGA  10/19/23  1:33 PM

## 2024-01-05 RX ORDER — METFORMIN HYDROCHLORIDE 500 MG/1
TABLET, EXTENDED RELEASE ORAL
Qty: 90 TABLET | Refills: 1 | Status: SHIPPED | OUTPATIENT
Start: 2024-01-05

## 2024-01-18 ENCOUNTER — OFFICE VISIT (OUTPATIENT)
Age: 69
End: 2024-01-18
Payer: MEDICARE

## 2024-01-18 VITALS
BODY MASS INDEX: 33.05 KG/M2 | HEART RATE: 72 BPM | SYSTOLIC BLOOD PRESSURE: 143 MMHG | TEMPERATURE: 97.5 F | RESPIRATION RATE: 18 BRPM | WEIGHT: 237 LBS | DIASTOLIC BLOOD PRESSURE: 76 MMHG | OXYGEN SATURATION: 97 %

## 2024-01-18 DIAGNOSIS — E11.9 TYPE 2 DIABETES MELLITUS WITHOUT COMPLICATION, WITHOUT LONG-TERM CURRENT USE OF INSULIN (HCC): ICD-10-CM

## 2024-01-18 DIAGNOSIS — N40.0 BENIGN PROSTATIC HYPERPLASIA, UNSPECIFIED WHETHER LOWER URINARY TRACT SYMPTOMS PRESENT: Primary | ICD-10-CM

## 2024-01-18 DIAGNOSIS — N52.01 ERECTILE DYSFUNCTION DUE TO ARTERIAL INSUFFICIENCY: ICD-10-CM

## 2024-01-18 DIAGNOSIS — R13.19 OTHER DYSPHAGIA: ICD-10-CM

## 2024-01-18 DIAGNOSIS — Z12.5 PROSTATE CANCER SCREENING: ICD-10-CM

## 2024-01-18 PROCEDURE — 2022F DILAT RTA XM EVC RTNOPTHY: CPT | Performed by: INTERNAL MEDICINE

## 2024-01-18 PROCEDURE — 1123F ACP DISCUSS/DSCN MKR DOCD: CPT | Performed by: INTERNAL MEDICINE

## 2024-01-18 PROCEDURE — 3017F COLORECTAL CA SCREEN DOC REV: CPT | Performed by: INTERNAL MEDICINE

## 2024-01-18 PROCEDURE — 99215 OFFICE O/P EST HI 40 MIN: CPT | Performed by: INTERNAL MEDICINE

## 2024-01-18 PROCEDURE — G8417 CALC BMI ABV UP PARAM F/U: HCPCS | Performed by: INTERNAL MEDICINE

## 2024-01-18 PROCEDURE — 1036F TOBACCO NON-USER: CPT | Performed by: INTERNAL MEDICINE

## 2024-01-18 PROCEDURE — 3044F HG A1C LEVEL LT 7.0%: CPT | Performed by: INTERNAL MEDICINE

## 2024-01-18 PROCEDURE — G8427 DOCREV CUR MEDS BY ELIG CLIN: HCPCS | Performed by: INTERNAL MEDICINE

## 2024-01-18 PROCEDURE — G8484 FLU IMMUNIZE NO ADMIN: HCPCS | Performed by: INTERNAL MEDICINE

## 2024-01-18 RX ORDER — TADALAFIL 20 MG/1
20 TABLET ORAL DAILY PRN
Qty: 30 TABLET | Refills: 1 | Status: SHIPPED | OUTPATIENT
Start: 2024-01-18

## 2024-01-18 RX ORDER — OMEPRAZOLE 20 MG/1
20 CAPSULE, DELAYED RELEASE ORAL
Qty: 90 CAPSULE | Refills: 1 | Status: SHIPPED | OUTPATIENT
Start: 2024-01-18

## 2024-01-18 SDOH — ECONOMIC STABILITY: INCOME INSECURITY: HOW HARD IS IT FOR YOU TO PAY FOR THE VERY BASICS LIKE FOOD, HOUSING, MEDICAL CARE, AND HEATING?: NOT HARD AT ALL

## 2024-01-18 SDOH — ECONOMIC STABILITY: FOOD INSECURITY: WITHIN THE PAST 12 MONTHS, YOU WORRIED THAT YOUR FOOD WOULD RUN OUT BEFORE YOU GOT MONEY TO BUY MORE.: NEVER TRUE

## 2024-01-18 SDOH — ECONOMIC STABILITY: FOOD INSECURITY: WITHIN THE PAST 12 MONTHS, THE FOOD YOU BOUGHT JUST DIDN'T LAST AND YOU DIDN'T HAVE MONEY TO GET MORE.: NEVER TRUE

## 2024-01-18 SDOH — ECONOMIC STABILITY: HOUSING INSECURITY
IN THE LAST 12 MONTHS, WAS THERE A TIME WHEN YOU DID NOT HAVE A STEADY PLACE TO SLEEP OR SLEPT IN A SHELTER (INCLUDING NOW)?: NO

## 2024-01-18 ASSESSMENT — PATIENT HEALTH QUESTIONNAIRE - PHQ9
SUM OF ALL RESPONSES TO PHQ9 QUESTIONS 1 & 2: 0
SUM OF ALL RESPONSES TO PHQ QUESTIONS 1-9: 0
1. LITTLE INTEREST OR PLEASURE IN DOING THINGS: 0
2. FEELING DOWN, DEPRESSED OR HOPELESS: 0

## 2024-01-18 NOTE — PROGRESS NOTES
I have reviewed all needed documentation in preparation for visit. Verified patient by name and date of birth  Chief Complaint   Patient presents with    Follow-up    Diabetes     F/U on diabetes.       There were no vitals filed for this visit.    Health Maintenance Due   Topic Date Due    Respiratory Syncytial Virus (RSV) Pregnant or age 60 yrs+ (1 - 1-dose 60+ series) Never done    Pneumococcal 65+ years Vaccine (2 - PCV) 07/15/2014    Shingles vaccine (2 of 3) 07/28/2015    Diabetic retinal exam  02/03/2016    Diabetic foot exam  11/08/2019    DTaP/Tdap/Td vaccine (2 - Td or Tdap) 03/22/2023    Annual Wellness Visit (Medicare)  Never done    A1C test (Diabetic or Prediabetic)  01/31/2024    Diabetic Alb to Cr ratio (uACR) test  01/31/2024    Lipids  01/31/2024    Depression Screen  01/31/2024       1. \"Have you been to the ER, urgent care clinic since your last visit?  Hospitalized since your last visit?\" No    2. \"Have you seen or consulted any other health care providers outside of the [facility] since your last visit?\" No    3. For patients aged 45-75: Has the patient had a colonoscopy / FIT/ Cologuard? Yes, up to date.       If the patient is female:    4. For patients aged 40-74: Has the patient had a mammogram within the past 2 years? NA      5. For patients aged 21-65: Has the patient had a pap smear? VIKAS Worrell

## 2024-01-18 NOTE — ASSESSMENT & PLAN NOTE
At goal, continue current medications      Hemoglobin A1C   Date Value Ref Range Status   01/31/2023 6.5 (H) 4.0 - 5.6 % Final     Comment:     NEW METHOD  PLEASE NOTE NEW REFERENCE RANGE  (NOTE)  HbA1C Interpretive Ranges  <5.7              Normal  5.7 - 6.4         Consider Prediabetes  >6.5              Consider Diabetes

## 2024-01-18 NOTE — PROGRESS NOTES
Chief Complaint   Patient presents with    Follow-up    Diabetes     F/U on diabetes.     Had knee done in march did well    Not compliant with meds  BP and diabetes    Back working  Feels well  Weight up    Does not check sugars at all      SUBJECTIVE: Carlito Potter is a 68 y.o. male seen for a follow up visit; he has diabetes and hypertension.  Current Outpatient Medications   Medication Sig Dispense Refill    omeprazole (PRILOSEC) 20 MG delayed release capsule Take 1 capsule by mouth every morning (before breakfast) 90 capsule 1    tadalafil (CIALIS) 20 MG tablet Take 1 tablet by mouth daily as needed for Erectile Dysfunction 30 tablet 1    metFORMIN (GLUCOPHAGE-XR) 500 MG extended release tablet TAKE 3 TABLETS BY MOUTH EVERY DAY WITH DINNER (Patient taking differently: Take 1 tablet by mouth in the morning and at bedtime) 90 tablet 1    lisinopril-hydroCHLOROthiazide (PRINZIDE;ZESTORETIC) 20-25 MG per tablet TAKE 1 TABLET BY MOUTH EVERY DAY (Patient taking differently: Take 1 tablet by mouth daily) 90 tablet 1    atorvastatin (LIPITOR) 10 MG tablet TAKE 1 TABLET BY MOUTH EVERY DAY 90 tablet 1    acetaminophen (TYLENOL) 500 MG tablet Take 1 tablet by mouth every 4 hours      colchicine (COLCRYS) 0.6 MG tablet Take 1 tablet by mouth in the morning and at bedtime for 10 days (Patient not taking: Reported on 1/18/2024) 20 tablet 0     No current facility-administered medications for this visit.     Patient Active Problem List   Diagnosis    Lymphadenopathy    Primary localized osteoarthritis of right knee    ED (erectile dysfunction)    BPH (benign prostatic hyperplasia)    Diverticular disease of left colon    Diabetes mellitus (HCC)    Asthma    Laceration of right index finger without foreign body    Injury of digital nerve of right index finger    Gout    Dyslipidemia (high LDL; low HDL)    Borderline hypertension    Arthralgia of knee    Primary osteoarthritis of left knee     System Review: Cardiovascular ROS

## 2024-01-19 LAB
ALBUMIN SERPL-MCNC: 4.4 G/DL (ref 3.5–5)
ALBUMIN/GLOB SERPL: 1.3 (ref 1.1–2.2)
ALP SERPL-CCNC: 67 U/L (ref 45–117)
ALT SERPL-CCNC: 43 U/L (ref 12–78)
ANION GAP SERPL CALC-SCNC: 9 MMOL/L (ref 5–15)
AST SERPL-CCNC: 28 U/L (ref 15–37)
BASOPHILS # BLD: 0 K/UL (ref 0–0.1)
BASOPHILS NFR BLD: 1 % (ref 0–1)
BILIRUB SERPL-MCNC: 0.7 MG/DL (ref 0.2–1)
BUN SERPL-MCNC: 19 MG/DL (ref 6–20)
BUN/CREAT SERPL: 14 (ref 12–20)
CALCIUM SERPL-MCNC: 8.8 MG/DL (ref 8.5–10.1)
CHLORIDE SERPL-SCNC: 104 MMOL/L (ref 97–108)
CHOLEST SERPL-MCNC: 117 MG/DL
CO2 SERPL-SCNC: 26 MMOL/L (ref 21–32)
CREAT SERPL-MCNC: 1.39 MG/DL (ref 0.7–1.3)
CREAT UR-MCNC: 171 MG/DL
DIFFERENTIAL METHOD BLD: ABNORMAL
EOSINOPHIL # BLD: 0.1 K/UL (ref 0–0.4)
EOSINOPHIL NFR BLD: 3 % (ref 0–7)
ERYTHROCYTE [DISTWIDTH] IN BLOOD BY AUTOMATED COUNT: 14.6 % (ref 11.5–14.5)
EST. AVERAGE GLUCOSE BLD GHB EST-MCNC: 143 MG/DL
GLOBULIN SER CALC-MCNC: 3.5 G/DL (ref 2–4)
GLUCOSE SERPL-MCNC: 175 MG/DL (ref 65–100)
HBA1C MFR BLD: 6.6 % (ref 4–5.6)
HCT VFR BLD AUTO: 43.3 % (ref 36.6–50.3)
HDLC SERPL-MCNC: 34 MG/DL
HDLC SERPL: 3.4 (ref 0–5)
HGB BLD-MCNC: 14.8 G/DL (ref 12.1–17)
IMM GRANULOCYTES # BLD AUTO: 0 K/UL
IMM GRANULOCYTES NFR BLD AUTO: 0 %
LDLC SERPL CALC-MCNC: 48.4 MG/DL (ref 0–100)
LYMPHOCYTES # BLD: 1 K/UL (ref 0.8–3.5)
LYMPHOCYTES NFR BLD: 22 % (ref 12–49)
MCH RBC QN AUTO: 30.6 PG (ref 26–34)
MCHC RBC AUTO-ENTMCNC: 34.2 G/DL (ref 30–36.5)
MCV RBC AUTO: 89.6 FL (ref 80–99)
MICROALBUMIN UR-MCNC: 1.31 MG/DL
MICROALBUMIN/CREAT UR-RTO: 8 MG/G (ref 0–30)
MONOCYTES # BLD: 0.3 K/UL (ref 0–1)
MONOCYTES NFR BLD: 7 % (ref 5–13)
NEUTS SEG # BLD: 3.2 K/UL (ref 1.8–8)
NEUTS SEG NFR BLD: 67 % (ref 32–75)
NRBC # BLD: 0 K/UL (ref 0–0.01)
NRBC BLD-RTO: 0 PER 100 WBC
PLATELET # BLD AUTO: 110 K/UL (ref 150–400)
PMV BLD AUTO: 12.8 FL (ref 8.9–12.9)
POTASSIUM SERPL-SCNC: 4 MMOL/L (ref 3.5–5.1)
PROT SERPL-MCNC: 7.9 G/DL (ref 6.4–8.2)
PSA SERPL-MCNC: 1.1 NG/ML (ref 0.01–4)
RBC # BLD AUTO: 4.83 M/UL (ref 4.1–5.7)
RBC MORPH BLD: ABNORMAL
SODIUM SERPL-SCNC: 139 MMOL/L (ref 136–145)
TRIGL SERPL-MCNC: 173 MG/DL
VLDLC SERPL CALC-MCNC: 34.6 MG/DL
WBC # BLD AUTO: 4.6 K/UL (ref 4.1–11.1)

## 2024-03-05 RX ORDER — METFORMIN HYDROCHLORIDE 500 MG/1
TABLET, EXTENDED RELEASE ORAL
Qty: 90 TABLET | Refills: 1 | OUTPATIENT
Start: 2024-03-05

## 2024-03-05 NOTE — TELEPHONE ENCOUNTER
Please verify dose that pt is currently taking. The chart says it's different than what is being written.

## 2024-03-12 RX ORDER — METFORMIN HYDROCHLORIDE 500 MG/1
TABLET, EXTENDED RELEASE ORAL
Qty: 90 TABLET | Refills: 1 | OUTPATIENT
Start: 2024-03-12

## 2024-03-12 NOTE — TELEPHONE ENCOUNTER
Refill request received from [facility] for   Requested Prescriptions     Pending Prescriptions Disp Refills    metFORMIN (GLUCOPHAGE-XR) 500 MG extended release tablet 90 tablet 1     Last office visit: 1/18/2024   Next office visit: Visit date not found     Routed to Dr Kike Kolb for review.       Med management lists pt \"taking med differently\"

## 2024-03-14 RX ORDER — METFORMIN HYDROCHLORIDE 500 MG/1
500 TABLET, EXTENDED RELEASE ORAL 2 TIMES DAILY
Status: CANCELLED | OUTPATIENT
Start: 2024-03-14

## 2024-04-03 RX ORDER — ATORVASTATIN CALCIUM 10 MG/1
TABLET, FILM COATED ORAL
Qty: 90 TABLET | Refills: 1 | Status: SHIPPED | OUTPATIENT
Start: 2024-04-03

## 2024-04-03 RX ORDER — METFORMIN HYDROCHLORIDE 500 MG/1
TABLET, EXTENDED RELEASE ORAL
Qty: 270 TABLET | Refills: 0 | Status: SHIPPED | OUTPATIENT
Start: 2024-04-03

## 2024-05-13 RX ORDER — COLCHICINE 0.6 MG/1
0.6 TABLET ORAL 2 TIMES DAILY
Qty: 20 TABLET | Refills: 2 | Status: SHIPPED | OUTPATIENT
Start: 2024-05-13 | End: 2024-06-12

## 2024-05-13 NOTE — TELEPHONE ENCOUNTER
Refill request received from [facility] for   Requested Prescriptions     Pending Prescriptions Disp Refills    colchicine (COLCRYS) 0.6 MG tablet 20 tablet 0     Sig: Take 1 tablet by mouth in the morning and at bedtime for 10 days     Last office visit: 1/18/2024   Next office visit: Visit date not found     Routed to Dr Kike Kolb for review.

## 2024-05-21 NOTE — ADDENDUM NOTE
Addended by: Allyson Quintana on: 2/6/2022 12:54 PM     Modules accepted: Orders Please call patient and confirm that they would like to remain a patient with our office. If yes, please schedule annual physical, if not, please update their chart.      She is now due for her A1c to be rechecked also.  Please schedule a nurse visit as well.

## 2024-06-28 RX ORDER — LISINOPRIL AND HYDROCHLOROTHIAZIDE 25; 20 MG/1; MG/1
TABLET ORAL
Qty: 90 TABLET | Refills: 1 | Status: SHIPPED | OUTPATIENT
Start: 2024-06-28

## 2024-07-13 DIAGNOSIS — R13.19 OTHER DYSPHAGIA: ICD-10-CM

## 2024-07-16 RX ORDER — OMEPRAZOLE 20 MG/1
20 CAPSULE, DELAYED RELEASE ORAL
Qty: 90 CAPSULE | Refills: 1 | Status: SHIPPED | OUTPATIENT
Start: 2024-07-16

## 2024-08-05 RX ORDER — METFORMIN HYDROCHLORIDE 500 MG/1
TABLET, EXTENDED RELEASE ORAL
Qty: 270 TABLET | Refills: 0 | Status: SHIPPED | OUTPATIENT
Start: 2024-08-05

## 2024-09-25 RX ORDER — ATORVASTATIN CALCIUM 10 MG/1
TABLET, FILM COATED ORAL
Qty: 90 TABLET | Refills: 1 | Status: SHIPPED | OUTPATIENT
Start: 2024-09-25

## 2024-10-07 ENCOUNTER — TELEPHONE (OUTPATIENT)
Age: 69
End: 2024-10-07

## 2024-10-07 DIAGNOSIS — E11.9 TYPE 2 DIABETES MELLITUS WITHOUT COMPLICATION, WITHOUT LONG-TERM CURRENT USE OF INSULIN (HCC): Primary | ICD-10-CM

## 2024-10-07 RX ORDER — BLOOD-GLUCOSE METER
1 KIT MISCELLANEOUS DAILY
Qty: 1 KIT | Refills: 0 | Status: SHIPPED | OUTPATIENT
Start: 2024-10-07

## 2024-10-07 NOTE — TELEPHONE ENCOUNTER
Pt would like to have notification sent to the pharmacy so that he can receive a Blood Glucose Monitor through Medicare. He would lie for it to be sent to the following pharmacy    [facility]   [address] [place] [address]  [phone]

## 2024-10-07 NOTE — TELEPHONE ENCOUNTER
Orders Placed This Encounter    glucose monitoring kit     Si kit by Does not apply route daily     Dispense:  1 kit     Refill:  0

## 2024-10-13 DIAGNOSIS — R13.19 OTHER DYSPHAGIA: ICD-10-CM

## 2024-10-15 ENCOUNTER — TELEPHONE (OUTPATIENT)
Age: 69
End: 2024-10-15

## 2024-10-15 RX ORDER — DIPHENHYDRAMINE HCL 25 MG
TABLET ORAL
Qty: 1 KIT | Refills: 5 | Status: SHIPPED | OUTPATIENT
Start: 2024-10-15

## 2024-10-15 NOTE — TELEPHONE ENCOUNTER
H&P- Alia Quinn 1962, 60 y o  male MRN: [id number]    Unit/Bed#: ED 21 Encounter: [id number]    Primary Care Provider: Yulia Wills DO   Date and time admitted to hospital: 7/7/2020  7:09 PM        * Acute renal failure superimposed on stage 4 chronic kidney disease ([facility] [address] )  Assessment & Plan  · Previous creatinine of 2 6 6 days ago, now increased to 6 45  · Patient reports since he was discharged a few days ago he has had decreased appetite, decreased urination, fatigue  · Per Nephrology, patient given 2 L in ED and started on bicarb 150 mL/hr  · Avoid NSAIDs, nephrotoxins, hypotension  · UA pending  · Nephrology consulted, appreciate recommendations    Anemia  Assessment & Plan  Patient with chronic anemia, review of epic shows baseline between 7 and 8  Likely secondary to liver disease and/or chronic kidney disease  · Patient has received transfusions in the past  · Current hemoglobin of 7 3, no signs of bleeding  · Per previous notes, patient with macrocytosis, recommend outpatient follow-up    Essential hypertension  Assessment & Plan  · Continue home medications with holding parameters  · Monitor per unit protocol    Liver cirrhosis secondary to ROSALES [facility])  Assessment & Plan  Status post recent liver transplant [facility]  · Continue tacrolimus, sirolimus, CellCept, acyclovir, Actigall  · LFTs show alk phos of 252, T bili at 1 5, slightly increased from previous    CROW on CPAP  Assessment & Plan  · Continue CPAP at night    VTE Prophylaxis: Heparin  / sequential compression device   Code Status:  Full code  POLST: POLST is not applicable to this patient  Discussion with family:     Anticipated Length of Stay:  Patient will be admitted on an Inpatient basis with an anticipated length of stay of  > 2 midnights  Justification for Hospital Stay:  Patient with acute renal failure requiring nephrology consultation    Total Time for Visit, including Counseling / Coordination of Care: 1 hour    Greater than 50% PT was prescribed a glucose meter it was sent to the pharmacy but the meter that was sent is no longer being made anymore. PT stated Pharmacy suggested a meter to be sent that is covered by medicare/insurance. Pt is requesting a meter, test strip and lancets that are covered by medicare/ insurance to be sent to pharmacy on file.                 of this total time spent on direct patient counseling and coordination of care  Chief Complaint:     Chief Complaint   Patient presents with    Dehydration     Pt was referred to the ED by his transplant PCP who told them he is dehydrated and needs further blood work  Pt reports fatigue and cough for a couple days  Pt stated his liver transplant 10/2019         History of Present Illness:    Kerri Frederick is a 60 y o  male with history of CKD, liver transplant, chronic anemia who presents with elevated creatinine  History is obtained from patient and wife at bedside  Patient was recently admitted 1 week ago for syncope  He had a full workup done, and was discharged the following day  Patient reports since then he has felt fatigued, overall malaise, and has a slight cough  He denies any fever chills  He reports he has had decreased appetite and has not been eating or drinking very much  He denies any abdominal pain, nausea vomiting or diarrhea  Denies any blood in his stool  Patient does have a history of a liver transplant, he follows closely [facility]  He reports he has been compliant with all his medications  He reports he has been urinating much less frequently, but he is still urinating  Denies any pain or burning with urination  He denies any cellulitis or wounds that may be infected  Review of Systems:    Review of Systems   Constitutional: Positive for activity change, appetite change and fatigue  Negative for chills and fever  HENT: Negative for congestion, postnasal drip, sinus pain and sore throat  Eyes: Negative for visual disturbance  Respiratory: Positive for cough and shortness of breath  Negative for chest tightness and wheezing  Cardiovascular: Negative for chest pain, palpitations and leg swelling  Gastrointestinal: Negative for abdominal pain, blood in stool, constipation, diarrhea, nausea and vomiting  Genitourinary: Negative for dysuria, flank pain and hematuria  Musculoskeletal: Negative for back pain and myalgias  Skin: Negative for rash  Neurological: Negative for dizziness, light-headedness and headaches  Hematological: Does not bruise/bleed easily  Psychiatric/Behavioral: Negative for behavioral problems  Past Medical and Surgical History:     Past Medical History:   Diagnosis Date    Anasarca     Chronic pain disorder     lower back    CPAP (continuous positive airway pressure) dependence     Heart murmur     Hypertension     Liver cirrhosis secondary to ROSALES ([facility] [address] )     Myocardial infarction ([facility] [address] )     Renal insufficiency 6/7/2019    Sleep apnea     Vitamin D deficiency        Past Surgical History:   Procedure Laterality Date    IR [facility] REMOVAL  3/27/2020    IR TEMP HD CATH  9/10/2019    IR TUBE PLACEMENT NEGRA  9/16/2019    KNEE ARTHROSCOPY Bilateral     KNEE SURGERY      OH COLONOSCOPY FLX DX W/COLLJ SPEC WHEN PFRMD N/A 11/15/2017    Procedure: COLONOSCOPY;  Surgeon: Hamida Scott MD;  Location: [facility]; Service: Gastroenterology       Meds/Allergies:    Prior to Admission medications    Medication Sig Start Date End Date Taking?  Authorizing Provider   acyclovir (ZOVIRAX) 400 MG tablet Take 400 mg by mouth 2 (two) times a day    Historical Provider, MD   albuterol (PROVENTIL HFA,VENTOLIN HFA) 90 mcg/act inhaler Inhale 2 puffs every 6 (six) hours as needed for wheezing or shortness of breath    Historical Provider, MD   amLODIPine (NORVASC) 2 5 mg tablet  2/7/20   Historical Provider, MD   cyanocobalamin (VITAMIN B-12) 50 MCG tablet Take 1 tablet (50 mcg total) by mouth daily  Patient not taking: Reported on 2/25/2020 9/26/19   MARKOS Carlin   dapsone 100 mg tablet Take 100 mg by mouth daily    Historical Provider, MD   famotidine (PEPCID) 20 mg tablet Take 15 mg by mouth daily     Historical Provider, MD   folic acid (FOLVITE) 457 mcg tablet Take 1 tablet (400 mcg total) by mouth daily  Patient not taking: Reported on 2020   MARKOS Mark   insulin aspart (NovoLOG) 100 units/mL injection Inject under the skin 3 (three) times a day before meals    Historical Provider, MD   magnesium oxide (MAG-OX) 400 mg Take 400 mg by mouth 2 (two) times a day    Historical Provider, MD   mycophenolate (CELLCEPT) 500 mg tablet Take 1,000 mg by mouth every 12 (twelve) hours    Historical Provider, MD   predniSONE 1 mg tablet Take 5 mg by mouth daily    Historical Provider, MD   sirolimus (RAPAMUNE) 1 mg tablet Take 2 mg by mouth daily    Historical Provider, MD   sodium bicarbonate 650 mg tablet Take 650 mg by mouth 2 (two) times a day    Historical Provider, MD   tacrolimus (PROGRAF) 1 mg capsule 3mg in the morning, 3mg in the evening 20   Historical Provider, MD   ursodiol (ACTIGALL) 300 mg capsule Take 300 mg by mouth 3 (three) times a day     Historical Provider, MD     I have reviewed home medications with patient family member  Allergies:    Allergies   Allergen Reactions    Lasix [Furosemide] Hives    Propranolol Eye Swelling    Spironolactone      He reports having hives and anaphylaxis while on lasix and Spinorolactone    Torsemide Eye Swelling       Social History:     Marital Status: /Civil Union   Patient Pre-hospital Living Situation:  Private residence  Patient Pre-hospital Diet Restrictions:  None  Substance Use History:   Social History     Substance and Sexual Activity   Alcohol Use Never    Frequency: Never    Comment: RARE     Social History     Tobacco Use   Smoking Status Former Smoker    Last attempt to quit: 11/15/1990    Years since quittin 6   Smokeless Tobacco Never Used     Social History     Substance and Sexual Activity   Drug Use No       Family History:    non-contributory    Physical Exam:     Vitals:   Blood Pressure: 97/60 (20 0100)  Pulse: 89 (20 0421)  Temperature: 98 1 °F (36 7 °C) (20 0030)  Temp Source: Oral (20 003)  Respirations: (!) 28 (07/08/20 0421)  Height: 5' 11" (180 3 cm) (07/08/20 0030)  Weight - Scale: 108 kg (238 lb 1 6 oz) (07/08/20 0030)  SpO2: 91 % (07/08/20 0421)    Physical Exam   Constitutional: He appears well-developed and well-nourished  No distress  Patient is lying in bed, no acute distress   HENT:   Head: Normocephalic and atraumatic  Eyes: Pupils are equal, round, and reactive to light  Neck: Normal range of motion  Neck supple  Cardiovascular: Normal rate and regular rhythm  No murmur heard  Pulmonary/Chest: Effort normal  He has decreased breath sounds in the right lower field and the left lower field  He has no wheezes  He has no rales  Abdominal: Soft  Bowel sounds are normal  There is no tenderness  Musculoskeletal: Normal range of motion  He exhibits no edema  Neurological: He is alert  Skin: Skin is warm and dry  He is not diaphoretic  Psychiatric: He has a normal mood and affect  Vitals reviewed  Additional Data:     Lab Results: I have personally reviewed pertinent reports  Results from last 7 days   Lab Units 07/07/20 1942   WBC Thousand/uL 8 62   HEMOGLOBIN g/dL 7 3*   HEMATOCRIT % 23 7*   PLATELETS Thousands/uL 121*   NEUTROS PCT % 88*   LYMPHS PCT % 1*   MONOS PCT % 8   EOS PCT % 1     Results from last 7 days   Lab Units 07/07/20 1942   SODIUM mmol/L 136   POTASSIUM mmol/L 3 9   CHLORIDE mmol/L 104   CO2 mmol/L 15*   BUN mg/dL 59*   CREATININE mg/dL 6 45*   ANION GAP mmol/L 17*   CALCIUM mg/dL 8 6   ALBUMIN g/dL 2 6*   TOTAL BILIRUBIN mg/dL 1 50*   ALK PHOS U/L 252*   ALT U/L 49   AST U/L 37   GLUCOSE RANDOM mg/dL 131     Results from last 7 days   Lab Units 07/07/20  1942   INR  1 10     Results from last 7 days   Lab Units 07/02/20  1106 07/02/20  0714 07/01/20  2148   POC GLUCOSE mg/dl 146* 96 120         Results from last 7 days   Lab Units 07/07/20 1942   LACTIC ACID mmol/L 0 7   PROCALCITONIN ng/ml 0 98*       Imaging: I have personally reviewed pertinent reports  XR chest 1 view portable   Final Result by Wiley Manning MD (07/08 0325)      No focal consolidation, pleural effusion, or pneumothorax  Workstation performed: [id number]             EKG, Pathology, and Other Studies Reviewed on Admission:   · EKG:  None on file    Allscripts / Epic Records Reviewed: Yes     ** Please Note: This note has been constructed using a voice recognition system   **

## 2024-11-07 RX ORDER — METFORMIN HYDROCHLORIDE 500 MG/1
TABLET, EXTENDED RELEASE ORAL
Qty: 270 TABLET | Refills: 0 | Status: SHIPPED | OUTPATIENT
Start: 2024-11-07

## 2024-11-21 ENCOUNTER — OFFICE VISIT (OUTPATIENT)
Age: 69
End: 2024-11-21
Payer: MEDICARE

## 2024-11-21 VITALS
BODY MASS INDEX: 34.42 KG/M2 | DIASTOLIC BLOOD PRESSURE: 78 MMHG | SYSTOLIC BLOOD PRESSURE: 136 MMHG | HEIGHT: 69 IN | WEIGHT: 232.4 LBS | OXYGEN SATURATION: 98 % | HEART RATE: 85 BPM | RESPIRATION RATE: 18 BRPM | TEMPERATURE: 97.7 F

## 2024-11-21 DIAGNOSIS — Z12.5 PROSTATE CANCER SCREENING: ICD-10-CM

## 2024-11-21 DIAGNOSIS — I10 ESSENTIAL (PRIMARY) HYPERTENSION: ICD-10-CM

## 2024-11-21 DIAGNOSIS — E11.9 TYPE 2 DIABETES MELLITUS WITHOUT COMPLICATION, WITHOUT LONG-TERM CURRENT USE OF INSULIN (HCC): ICD-10-CM

## 2024-11-21 DIAGNOSIS — E78.00 PURE HYPERCHOLESTEROLEMIA: ICD-10-CM

## 2024-11-21 DIAGNOSIS — Z12.11 COLON CANCER SCREENING: ICD-10-CM

## 2024-11-21 DIAGNOSIS — Z23 NEEDS FLU SHOT: Primary | ICD-10-CM

## 2024-11-21 DIAGNOSIS — Z00.00 MEDICARE ANNUAL WELLNESS VISIT, SUBSEQUENT: ICD-10-CM

## 2024-11-21 PROCEDURE — 99214 OFFICE O/P EST MOD 30 MIN: CPT | Performed by: INTERNAL MEDICINE

## 2024-11-21 PROCEDURE — 90653 IIV ADJUVANT VACCINE IM: CPT | Performed by: INTERNAL MEDICINE

## 2024-11-21 ASSESSMENT — LIFESTYLE VARIABLES
HOW OFTEN DO YOU HAVE A DRINK CONTAINING ALCOHOL: MONTHLY OR LESS
HOW MANY STANDARD DRINKS CONTAINING ALCOHOL DO YOU HAVE ON A TYPICAL DAY: 1 OR 2

## 2024-11-21 ASSESSMENT — PATIENT HEALTH QUESTIONNAIRE - PHQ9
2. FEELING DOWN, DEPRESSED OR HOPELESS: NOT AT ALL
SUM OF ALL RESPONSES TO PHQ QUESTIONS 1-9: 0
SUM OF ALL RESPONSES TO PHQ QUESTIONS 1-9: 0
SUM OF ALL RESPONSES TO PHQ9 QUESTIONS 1 & 2: 0
SUM OF ALL RESPONSES TO PHQ QUESTIONS 1-9: 0
1. LITTLE INTEREST OR PLEASURE IN DOING THINGS: NOT AT ALL
SUM OF ALL RESPONSES TO PHQ QUESTIONS 1-9: 0

## 2024-11-21 NOTE — PROGRESS NOTES
I have reviewed all needed documentation in preparation for visit. Verified patient by name and date of birth  Chief Complaint   Patient presents with    Medicare AWV    Flu Vaccine       Vitals:    11/21/24 0839   BP: 136/78   Site: Left Upper Arm   Position: Sitting   Cuff Size: Large Adult   Pulse: 85   Resp: 18   Temp: 97.7 °F (36.5 °C)   TempSrc: Temporal   SpO2: 98%   Weight: 105.4 kg (232 lb 6.4 oz)   Height: 1.759 m (5' 9.25\")       Health Maintenance Due   Topic Date Due    Respiratory Syncytial Virus (RSV) Pregnant or age 60 yrs+ (1 - Risk 60-74 years 1-dose series) Never done    Pneumococcal 65+ years Vaccine (2 of 2 - PCV) 07/15/2014    Shingles vaccine (2 of 3) 07/28/2015    Diabetic retinal exam  02/03/2016    Diabetic foot exam  11/08/2019    DTaP/Tdap/Td vaccine (2 - Td or Tdap) 03/22/2023    Annual Wellness Visit (Medicare)  11/27/2023    Colorectal Cancer Screen  05/23/2024    Flu vaccine (1) 08/01/2024    COVID-19 Vaccine (5 - 2023-24 season) 09/01/2024     \"Have you been to the ER, urgent care clinic since your last visit?  Hospitalized since your last visit?\"    NO    “Have you seen or consulted any other health care providers outside of [facility] since your last visit?”    NO        “Have you had a colorectal cancer screening such as a colonoscopy/FIT/Cologuard?    YES - Type: Cologuard     No colonoscopy on file  Date of last Cologuard: 5/23/2021  No FIT/FOBT on file   No flexible sigmoidoscopy on file      Patient gave verbal consent to receive Flu Vaccine. Patient answered all Flu related questions. Patient was given VIS form. Patient expressed understanding for all Flu related information relayed.          Click Here for Release of Records Request         FAUSTINA Andrade  
for addtional testing after 24 hours, recollection will be required.          Cologuard (Fecal DNA Colorectal Cancer Screening) [[id number]]     Order Status: Sent Specimen: Stool

## 2024-11-22 LAB
ALBUMIN SERPL-MCNC: 4.2 G/DL (ref 3.5–5)
ALBUMIN/GLOB SERPL: 1.3 (ref 1.1–2.2)
ALP SERPL-CCNC: 64 U/L (ref 45–117)
ALT SERPL-CCNC: 64 U/L (ref 12–78)
ANION GAP SERPL CALC-SCNC: 9 MMOL/L (ref 2–12)
AST SERPL-CCNC: 47 U/L (ref 15–37)
BASOPHILS # BLD: 0 K/UL (ref 0–0.1)
BASOPHILS NFR BLD: 0 % (ref 0–1)
BILIRUB SERPL-MCNC: 0.7 MG/DL (ref 0.2–1)
BUN SERPL-MCNC: 22 MG/DL (ref 6–20)
BUN/CREAT SERPL: 14 (ref 12–20)
CALCIUM SERPL-MCNC: 9.2 MG/DL (ref 8.5–10.1)
CHLORIDE SERPL-SCNC: 105 MMOL/L (ref 97–108)
CHOLEST SERPL-MCNC: 137 MG/DL
CO2 SERPL-SCNC: 25 MMOL/L (ref 21–32)
CREAT SERPL-MCNC: 1.55 MG/DL (ref 0.7–1.3)
CREAT UR-MCNC: 130 MG/DL
DIFFERENTIAL METHOD BLD: ABNORMAL
EOSINOPHIL # BLD: 0.2 K/UL (ref 0–0.4)
EOSINOPHIL NFR BLD: 2 % (ref 0–7)
ERYTHROCYTE [DISTWIDTH] IN BLOOD BY AUTOMATED COUNT: 14 % (ref 11.5–14.5)
EST. AVERAGE GLUCOSE BLD GHB EST-MCNC: 217 MG/DL
GLOBULIN SER CALC-MCNC: 3.3 G/DL (ref 2–4)
GLUCOSE SERPL-MCNC: 233 MG/DL (ref 65–100)
HBA1C MFR BLD: 9.2 % (ref 4–5.6)
HCT VFR BLD AUTO: 39.7 % (ref 36.6–50.3)
HDLC SERPL-MCNC: 36 MG/DL
HDLC SERPL: 3.8 (ref 0–5)
HGB BLD-MCNC: 13.7 G/DL (ref 12.1–17)
IMM GRANULOCYTES # BLD AUTO: 0 K/UL (ref 0–0.04)
IMM GRANULOCYTES NFR BLD AUTO: 1 % (ref 0–0.5)
LDLC SERPL CALC-MCNC: 53.8 MG/DL (ref 0–100)
LYMPHOCYTES # BLD: 0.9 K/UL (ref 0.8–3.5)
LYMPHOCYTES NFR BLD: 12 % (ref 12–49)
MCH RBC QN AUTO: 31.6 PG (ref 26–34)
MCHC RBC AUTO-ENTMCNC: 34.5 G/DL (ref 30–36.5)
MCV RBC AUTO: 91.5 FL (ref 80–99)
MICROALBUMIN UR-MCNC: 1.09 MG/DL
MICROALBUMIN/CREAT UR-RTO: 8 MG/G (ref 0–30)
MONOCYTES # BLD: 0.5 K/UL (ref 0–1)
MONOCYTES NFR BLD: 7 % (ref 5–13)
NEUTS SEG # BLD: 6 K/UL (ref 1.8–8)
NEUTS SEG NFR BLD: 78 % (ref 32–75)
NRBC # BLD: 0 K/UL (ref 0–0.01)
NRBC BLD-RTO: 0 PER 100 WBC
PLATELET # BLD AUTO: 116 K/UL (ref 150–400)
PMV BLD AUTO: 12.5 FL (ref 8.9–12.9)
POTASSIUM SERPL-SCNC: 4.5 MMOL/L (ref 3.5–5.1)
PROT SERPL-MCNC: 7.5 G/DL (ref 6.4–8.2)
PSA SERPL-MCNC: 1.2 NG/ML (ref 0.01–4)
RBC # BLD AUTO: 4.34 M/UL (ref 4.1–5.7)
SODIUM SERPL-SCNC: 139 MMOL/L (ref 136–145)
SPECIMEN HOLD: NORMAL
TRIGL SERPL-MCNC: 236 MG/DL
URATE SERPL-MCNC: 9.8 MG/DL (ref 3.5–7.2)
VLDLC SERPL CALC-MCNC: 47.2 MG/DL
WBC # BLD AUTO: 7.6 K/UL (ref 4.1–11.1)

## 2024-12-15 LAB — NONINV COLON CA DNA+OCC BLD SCRN STL QL: NEGATIVE

## 2025-01-03 RX ORDER — LISINOPRIL AND HYDROCHLOROTHIAZIDE 20; 25 MG/1; MG/1
TABLET ORAL
Qty: 90 TABLET | Refills: 1 | Status: SHIPPED | OUTPATIENT
Start: 2025-01-03

## 2025-02-06 RX ORDER — METFORMIN HYDROCHLORIDE 500 MG/1
TABLET, EXTENDED RELEASE ORAL
Qty: 270 TABLET | Refills: 2 | Status: SHIPPED | OUTPATIENT
Start: 2025-02-06

## 2025-02-06 NOTE — TELEPHONE ENCOUNTER
Refill request received from [facility]    for   Requested Prescriptions     Pending Prescriptions Disp Refills    metFORMIN (GLUCOPHAGE-XR) 500 MG extended release tablet [Pharmacy Med Name: METFORMIN HCL  MG TABLET] 270 tablet 0     Sig: TAKE 3 TABLETS BY MOUTH EVERY DAY WITH DINNER     Last office visit: 11/21/2024   Next office visit: 2/25/2025     Routed to Dr Kike Kolb for review.     Macy Hsu LPN

## 2025-02-25 ENCOUNTER — OFFICE VISIT (OUTPATIENT)
Age: 70
End: 2025-02-25
Payer: MEDICARE

## 2025-02-25 VITALS
OXYGEN SATURATION: 98 % | BODY MASS INDEX: 32.48 KG/M2 | RESPIRATION RATE: 16 BRPM | SYSTOLIC BLOOD PRESSURE: 110 MMHG | DIASTOLIC BLOOD PRESSURE: 61 MMHG | TEMPERATURE: 97.4 F | HEART RATE: 79 BPM | HEIGHT: 71 IN | WEIGHT: 232 LBS

## 2025-02-25 DIAGNOSIS — M10.00 IDIOPATHIC GOUT, UNSPECIFIED CHRONICITY, UNSPECIFIED SITE: Primary | ICD-10-CM

## 2025-02-25 DIAGNOSIS — E11.9 TYPE 2 DIABETES MELLITUS WITHOUT COMPLICATION, WITHOUT LONG-TERM CURRENT USE OF INSULIN (HCC): ICD-10-CM

## 2025-02-25 DIAGNOSIS — I10 ESSENTIAL (PRIMARY) HYPERTENSION: ICD-10-CM

## 2025-02-25 PROCEDURE — 99214 OFFICE O/P EST MOD 30 MIN: CPT | Performed by: INTERNAL MEDICINE

## 2025-02-25 RX ORDER — COLCHICINE 0.6 MG/1
TABLET ORAL
Qty: 30 TABLET | Refills: 3 | Status: SHIPPED | OUTPATIENT
Start: 2025-02-25

## 2025-02-25 RX ORDER — IBUPROFEN 200 MG
200 TABLET ORAL EVERY 6 HOURS PRN
COMMUNITY

## 2025-02-25 RX ORDER — ALLOPURINOL 100 MG/1
100 TABLET ORAL DAILY
Qty: 90 TABLET | Refills: 1 | Status: SHIPPED | OUTPATIENT
Start: 2025-02-25

## 2025-02-25 SDOH — ECONOMIC STABILITY: FOOD INSECURITY: WITHIN THE PAST 12 MONTHS, YOU WORRIED THAT YOUR FOOD WOULD RUN OUT BEFORE YOU GOT MONEY TO BUY MORE.: NEVER TRUE

## 2025-02-25 SDOH — ECONOMIC STABILITY: FOOD INSECURITY: WITHIN THE PAST 12 MONTHS, THE FOOD YOU BOUGHT JUST DIDN'T LAST AND YOU DIDN'T HAVE MONEY TO GET MORE.: NEVER TRUE

## 2025-02-25 SDOH — HEALTH STABILITY: PHYSICAL HEALTH: ON AVERAGE, HOW MANY MINUTES DO YOU ENGAGE IN EXERCISE AT THIS LEVEL?: 40 MIN

## 2025-02-25 SDOH — HEALTH STABILITY: PHYSICAL HEALTH: ON AVERAGE, HOW MANY DAYS PER WEEK DO YOU ENGAGE IN MODERATE TO STRENUOUS EXERCISE (LIKE A BRISK WALK)?: 2 DAYS

## 2025-02-25 ASSESSMENT — PATIENT HEALTH QUESTIONNAIRE - PHQ9
SUM OF ALL RESPONSES TO PHQ QUESTIONS 1-9: 0
2. FEELING DOWN, DEPRESSED OR HOPELESS: NOT AT ALL
SUM OF ALL RESPONSES TO PHQ QUESTIONS 1-9: 0
1. LITTLE INTEREST OR PLEASURE IN DOING THINGS: NOT AT ALL
SUM OF ALL RESPONSES TO PHQ QUESTIONS 1-9: 0
SUM OF ALL RESPONSES TO PHQ QUESTIONS 1-9: 0
SUM OF ALL RESPONSES TO PHQ9 QUESTIONS 1 & 2: 0

## 2025-02-25 NOTE — PROGRESS NOTES
I have reviewed all needed documentation in preparation for visit. Verified patient by name and date of birth  Chief Complaint   Patient presents with    3 Month Follow-Up       There were no vitals filed for this visit.    Health Maintenance Due   Topic Date Due    Respiratory Syncytial Virus (RSV) Pregnant or age 60 yrs+ (1 - Risk 60-74 years 1-dose series) Never done    Pneumococcal 50+ years Vaccine (2 of 2 - PCV) 07/15/2014    Shingles vaccine (2 of 3) 07/28/2015    Diabetic retinal exam  02/03/2016    Diabetic foot exam  11/08/2019    DTaP/Tdap/Td vaccine (2 - Td or Tdap) 03/22/2023    A1C test (Diabetic or Prediabetic)  02/21/2025     \"Have you been to the ER, urgent care clinic since your last visit?  Hospitalized since your last visit?\"    NO    “Have you seen or consulted any other health care providers outside of [facility] since your last visit?”    NO            Click Here for Release of Records Request         Juan R Valenzuela [facility]

## 2025-02-25 NOTE — PROGRESS NOTES
SUBJECTIVE: Carlito Potter is a 69 y.o. male seen for a follow up visit; he has diabetes, hypertension, and hyperlipidemia.  Current Outpatient Medications   Medication Sig Dispense Refill    ibuprofen (ADVIL;MOTRIN) 200 MG tablet Take 1 tablet by mouth every 6 hours as needed for Pain      allopurinol (ZYLOPRIM) 100 MG tablet Take 1 tablet by mouth daily 90 tablet 1    colchicine (COLCRYS) 0.6 MG tablet One po daily for 30 days  then prn 30 tablet 3    metFORMIN (GLUCOPHAGE-XR) 500 MG extended release tablet TAKE 3 TABLETS BY MOUTH EVERY DAY WITH DINNER 270 tablet 2    lisinopril-hydroCHLOROthiazide (PRINZIDE;ZESTORETIC) 20-25 MG per tablet TAKE 1 TABLET BY MOUTH EVERY DAY 90 tablet 1    empagliflozin (JARDIANCE) 25 MG tablet Take 1 tablet by mouth daily 90 tablet 1    Blood Glucose Monitoring Suppl (TRUE METRIX AIR GLUCOSE METER) w/Device KIT Use daily 1 kit 5    omeprazole (PRILOSEC) 20 MG delayed release capsule TAKE 1 CAPSULE BY MOUTH EVERY DAY IN THE MORNING BEFORE BREAKFAST 90 capsule 1    glucose monitoring kit 1 kit by Does not apply route daily 1 kit 0    atorvastatin (LIPITOR) 10 MG tablet TAKE 1 TABLET BY MOUTH EVERY DAY 90 tablet 1    colchicine (COLCRYS) 0.6 MG tablet Take 1 tablet by mouth in the morning and at bedtime 20 tablet 2    tadalafil (CIALIS) 20 MG tablet Take 1 tablet by mouth daily as needed for Erectile Dysfunction 30 tablet 1    acetaminophen (TYLENOL) 500 MG tablet Take 1 tablet by mouth every 4 hours       No current facility-administered medications for this visit.     Patient Active Problem List   Diagnosis    Lymphadenopathy    Primary localized osteoarthritis of right knee    ED (erectile dysfunction)    BPH (benign prostatic hyperplasia)    Diverticular disease of left colon    Diabetes mellitus (HCC)    Asthma    Laceration of right index finger without foreign body    Injury of digital nerve of right index finger    Gout    Dyslipidemia (high LDL; low HDL)    Borderline

## 2025-02-26 LAB
ALBUMIN SERPL-MCNC: 4.4 G/DL (ref 3.5–5)
ALBUMIN/GLOB SERPL: 1.2 (ref 1.1–2.2)
ALP SERPL-CCNC: 69 U/L (ref 45–117)
ALT SERPL-CCNC: 55 U/L (ref 12–78)
ANION GAP SERPL CALC-SCNC: 8 MMOL/L (ref 2–12)
AST SERPL-CCNC: 43 U/L (ref 15–37)
BILIRUB SERPL-MCNC: 0.8 MG/DL (ref 0.2–1)
BUN SERPL-MCNC: 26 MG/DL (ref 6–20)
BUN/CREAT SERPL: 15 (ref 12–20)
CALCIUM SERPL-MCNC: 10.3 MG/DL (ref 8.5–10.1)
CHLORIDE SERPL-SCNC: 100 MMOL/L (ref 97–108)
CO2 SERPL-SCNC: 28 MMOL/L (ref 21–32)
CREAT SERPL-MCNC: 1.79 MG/DL (ref 0.7–1.3)
EST. AVERAGE GLUCOSE BLD GHB EST-MCNC: 180 MG/DL
GLOBULIN SER CALC-MCNC: 3.7 G/DL (ref 2–4)
GLUCOSE SERPL-MCNC: 237 MG/DL (ref 65–100)
HBA1C MFR BLD: 7.9 % (ref 4–5.6)
POTASSIUM SERPL-SCNC: 4.4 MMOL/L (ref 3.5–5.1)
PROT SERPL-MCNC: 8.1 G/DL (ref 6.4–8.2)
SODIUM SERPL-SCNC: 136 MMOL/L (ref 136–145)

## 2025-03-31 RX ORDER — ATORVASTATIN CALCIUM 10 MG/1
10 TABLET, FILM COATED ORAL DAILY
Qty: 90 TABLET | Refills: 1 | Status: SHIPPED | OUTPATIENT
Start: 2025-03-31

## 2025-07-18 DIAGNOSIS — R13.19 OTHER DYSPHAGIA: ICD-10-CM

## 2025-07-18 RX ORDER — OMEPRAZOLE 20 MG/1
20 CAPSULE, DELAYED RELEASE ORAL
Qty: 90 CAPSULE | Refills: 0 | Status: SHIPPED | OUTPATIENT
Start: 2025-07-18

## 2025-07-18 RX ORDER — LISINOPRIL AND HYDROCHLOROTHIAZIDE 20; 25 MG/1; MG/1
1 TABLET ORAL DAILY
Qty: 90 TABLET | Refills: 0 | Status: SHIPPED | OUTPATIENT
Start: 2025-07-18

## 2025-07-18 NOTE — TELEPHONE ENCOUNTER
Refill request received from [facility] for   Requested Prescriptions     Pending Prescriptions Disp Refills    lisinopril-hydroCHLOROthiazide (PRINZIDE;ZESTORETIC) 20-25 MG per tablet 90 tablet 1     Sig: Take 1 tablet by mouth daily    omeprazole (PRILOSEC) 20 MG delayed release capsule 90 capsule 1     Sig: Take 1 capsule by mouth every morning (before breakfast)     Last office visit: 2/25/2025   Next office visit: Visit date not found     Routed to Dr Doug Lyman for review.

## 2025-07-18 NOTE — TELEPHONE ENCOUNTER
Called patient and spoke to him about establishing with another provider in the office. Patient states that he will call at a later date to set up an appt.

## 2025-07-24 RX ORDER — LISINOPRIL AND HYDROCHLOROTHIAZIDE 20; 25 MG/1; MG/1
1 TABLET ORAL DAILY
Qty: 90 TABLET | Refills: 0 | Status: SHIPPED | OUTPATIENT
Start: 2025-07-24

## 2025-07-25 NOTE — TELEPHONE ENCOUNTER
Spoke with patient who states he plans to continue his care at [facility]. Patient would like to establish care with Dr. Lyman. Offered to schedule while on the phone, however patient did not have his calendar with him at the time. Informed patient that I would send a Thounds message with a link for him to schedule once he looks at his schedule. He understood.     Sent Thounds message with link for patient to schedule an establishing care appointment with Dr. Lyman.

## 2025-07-30 ENCOUNTER — OFFICE VISIT (OUTPATIENT)
Age: 70
End: 2025-07-30
Payer: MEDICARE

## 2025-07-30 VITALS
HEIGHT: 71 IN | HEART RATE: 64 BPM | WEIGHT: 232.2 LBS | BODY MASS INDEX: 32.51 KG/M2 | SYSTOLIC BLOOD PRESSURE: 122 MMHG | DIASTOLIC BLOOD PRESSURE: 82 MMHG

## 2025-07-30 DIAGNOSIS — E11.22 TYPE 2 DIABETES MELLITUS WITH STAGE 3B CHRONIC KIDNEY DISEASE, WITHOUT LONG-TERM CURRENT USE OF INSULIN (HCC): Primary | ICD-10-CM

## 2025-07-30 DIAGNOSIS — I10 PRIMARY HYPERTENSION: ICD-10-CM

## 2025-07-30 DIAGNOSIS — N18.32 TYPE 2 DIABETES MELLITUS WITH STAGE 3B CHRONIC KIDNEY DISEASE, WITHOUT LONG-TERM CURRENT USE OF INSULIN (HCC): Primary | ICD-10-CM

## 2025-07-30 DIAGNOSIS — E78.2 MIXED HYPERLIPIDEMIA: ICD-10-CM

## 2025-07-30 PROCEDURE — 1126F AMNT PAIN NOTED NONE PRSNT: CPT | Performed by: INTERNAL MEDICINE

## 2025-07-30 PROCEDURE — 3051F HG A1C>EQUAL 7.0%<8.0%: CPT | Performed by: INTERNAL MEDICINE

## 2025-07-30 PROCEDURE — 99205 OFFICE O/P NEW HI 60 MIN: CPT | Performed by: INTERNAL MEDICINE

## 2025-07-30 PROCEDURE — G8427 DOCREV CUR MEDS BY ELIG CLIN: HCPCS | Performed by: INTERNAL MEDICINE

## 2025-07-30 PROCEDURE — 1160F RVW MEDS BY RX/DR IN RCRD: CPT | Performed by: INTERNAL MEDICINE

## 2025-07-30 PROCEDURE — 2022F DILAT RTA XM EVC RTNOPTHY: CPT | Performed by: INTERNAL MEDICINE

## 2025-07-30 PROCEDURE — 3079F DIAST BP 80-89 MM HG: CPT | Performed by: INTERNAL MEDICINE

## 2025-07-30 PROCEDURE — 1123F ACP DISCUSS/DSCN MKR DOCD: CPT | Performed by: INTERNAL MEDICINE

## 2025-07-30 PROCEDURE — G8417 CALC BMI ABV UP PARAM F/U: HCPCS | Performed by: INTERNAL MEDICINE

## 2025-07-30 PROCEDURE — 3017F COLORECTAL CA SCREEN DOC REV: CPT | Performed by: INTERNAL MEDICINE

## 2025-07-30 PROCEDURE — 3074F SYST BP LT 130 MM HG: CPT | Performed by: INTERNAL MEDICINE

## 2025-07-30 PROCEDURE — 1036F TOBACCO NON-USER: CPT | Performed by: INTERNAL MEDICINE

## 2025-07-30 PROCEDURE — 1159F MED LIST DOCD IN RCRD: CPT | Performed by: INTERNAL MEDICINE

## 2025-07-30 NOTE — PROGRESS NOTES
Chief Complaint   Patient presents with    New Patient    Diabetes     Pcp and pharmacy verified     History of Present Illness: Carlito Potter is a 70 y.o. male who I was asked to see in consult by Dr. Kike Kolb for evaluation of diabetes. Was diagnosed with diabetes around 2013. Current regimen is Metformin XR 500mg BID and Jardiance 25mg daily. Pt is not testing his BG. Most recent Hgb A1c was 7.9% in February 2025.     A typical day is as follows:  - Pt wakes around 7-730AM.  - He has breakfast around 730-8AM, yesterday he had eggs, with cheese, toast and diet soda.  - He denies having a mid-morning snack.  - He has lunch around Noon-2PM, yesterday he had chicken strips, fries and diet soda.  - He denies having an afternoon snack  - He has dinner around 5-730PM, last night he had 2 tacos, chips, salsa and unsweetened iced tea.  - He will have an evening snack. Last night he had cherries.    Exercise consists of \"work keeps me busy, I am a . I also do wood carving\".     No history of vascular disease. No known family hx of CAD, or CVA.    \"I am starting to feel some numbness in my toes.\"    Pt has CKD-3b, he is not followed by a nephrologist. \"I saw one in the past when I had a kidney stone, but I have not seen them in 2 years. He does not recall the kidney doctor's name.    Last eye exam was \"it has been awhile\". Pt denies hx of retinopathy.    Past Medical History:   Diagnosis Date    Arthralgia of knee 07/15/2013    Arthritis     Diabetes (HCC)     type 2    Diverticular disease of left colon 03/22/2013    GERD (gastroesophageal reflux disease)     Gout 12/06/1985    Hypertension     SLIGHTLY PER PT'S PCP     Past Surgical History:   Procedure Laterality Date    COLONOSCOPY  2000    Dr. Stroud- [facility]ILA      deviated septum age 14    ORTHOPEDIC SURGERY  1974    REPAIR OF CARTILAGE IN RT KNEE JOINT.    OTHER SURGICAL HISTORY  01/21/2016    excision of lymph node right axilla

## 2025-08-13 DIAGNOSIS — M10.00 IDIOPATHIC GOUT, UNSPECIFIED CHRONICITY, UNSPECIFIED SITE: ICD-10-CM

## 2025-08-13 RX ORDER — ALLOPURINOL 100 MG/1
100 TABLET ORAL DAILY
Qty: 90 TABLET | Refills: 0 | Status: SHIPPED | OUTPATIENT
Start: 2025-08-13

## (undated) DEVICE — SCRUB DRY SURG EZ SCRUB BRUSH PREOPERATIVE GRN

## (undated) DEVICE — 4-PORT MANIFOLD: Brand: NEPTUNE 2

## (undated) DEVICE — PADDING CAST SPEC 6INX4YD COT --

## (undated) DEVICE — STRYKER PERFORMANCE SERIES SAGITTAL BLADE: Brand: STRYKER PERFORMANCE SERIES

## (undated) DEVICE — SOLUTION IRRIG 3000ML 0.9% SOD CHL USP UROMATIC PLAS CONT

## (undated) DEVICE — TOTAL TRAY, 16FR 10ML SIL FOLEY, URN: Brand: MEDLINE

## (undated) DEVICE — SOLUTION SURG PREP 26 CC PURPREP

## (undated) DEVICE — SUT CHRMC 4-0 27IN RB1 BRN --

## (undated) DEVICE — GLOVE SURG SZ 65 L12IN FNGR THK94MIL STD WHT LTX FREE

## (undated) DEVICE — SYR 10ML LUER LOK 1/5ML GRAD --

## (undated) DEVICE — HANDLE LT SNAP ON ULT DURABLE LENS FOR TRUMPF ALC DISPOSABLE

## (undated) DEVICE — ZIPPERED TOGA, X-LARGE: Brand: FLYTE, SURGICOOL

## (undated) DEVICE — ERASECAUTI INTERMIT TRAY: Brand: MEDLINE INDUSTRIES, INC.

## (undated) DEVICE — Z DISCONTINUED USE 2744636  DRESSING AQUACEL 14 IN ALG W3.5XL14IN POLYUR FLM CVR W/ HYDRCOLL

## (undated) DEVICE — YANKAUER,FLEXIBLE HANDLE,REGLR CAPACITY: Brand: MEDLINE INDUSTRIES, INC.

## (undated) DEVICE — HANDPIECE SET WITH BONE CLEANING TIP AND SUCTION TUBE: Brand: INTERPULSE

## (undated) DEVICE — SUTURE STRATAFIX SPRL SZ 1 L14IN ABSRB VLT L48CM CTX 1/2 SXPD2B405

## (undated) DEVICE — BANDAGE COMPR M W6INXL10YD WHT BGE VELC E MTRX HK AND LOOP

## (undated) DEVICE — GOWN,PREVENTION PLUS,XLN/2XL,ST,22/CS: Brand: MEDLINE

## (undated) DEVICE — DRAPE,REIN 53X77,STERILE: Brand: MEDLINE

## (undated) DEVICE — TIP SUCT CRV REG REDI

## (undated) DEVICE — BRUSH SCRB DRY NL CLN LF GRN --

## (undated) DEVICE — T5 HOOD WITH PEEL AWAY FACE SHIELD

## (undated) DEVICE — DRESSING GZ FLUF 36X36 IN RND 2 PLY PD GZ AMER WHT CROSS

## (undated) DEVICE — INTENDED FOR TISSUE SEPARATION, AND OTHER PROCEDURES THAT REQUIRE A SHARP SURGICAL BLADE TO PUNCTURE OR CUT.: Brand: BARD-PARKER ® CARBON RIB-BACK BLADES

## (undated) DEVICE — CUSTOM CAST PD STR

## (undated) DEVICE — SUTURE VCRL SZ 2-0 L36IN ABSRB UD L40MM CT 1/2 CIR J957H

## (undated) DEVICE — SUTURE MCRYL SZ 3-0 L27IN ABSRB UD L24MM PS-1 3/8 CIR PRIM Y936H

## (undated) DEVICE — SYR 20ML LL STRL LF --

## (undated) DEVICE — ZIMMER® STERILE DISPOSABLE TOURNIQUET CUFF WITH PLC, DUAL PORT, SINGLE BLADDER, 34 IN. (86 CM)

## (undated) DEVICE — DRESSING HYDROCOLLOID BORDER 35X10 IN ALUM PRIMASEAL

## (undated) DEVICE — STERILE POLYISOPRENE POWDER-FREE SURGICAL GLOVES: Brand: PROTEXIS

## (undated) DEVICE — GLOVE SURG SZ 65 L12IN FNGR THK79MIL GRN LTX FREE

## (undated) DEVICE — HYPODERMIC SAFETY NEEDLE: Brand: MAGELLAN

## (undated) DEVICE — DRESSING HYDROFIBER AQUACEL AG ADVANTAGE 3.5X14 IN

## (undated) DEVICE — Device

## (undated) DEVICE — ANTERIOR TOTAL HIP-SMH: Brand: MEDLINE INDUSTRIES, INC.

## (undated) DEVICE — CATHETER IV 18GA L1.25IN FEP STR HUB INTROCAN SFTY

## (undated) DEVICE — ZIPPERED TOGA, 2X LARGE: Brand: FLYTE, SURGICOOL

## (undated) DEVICE — SUTURE VCRL SZ 0 L27IN ABSRB UD L36MM CT-1 1/2 CIR J260H

## (undated) DEVICE — INFECTION CONTROL KIT SYS

## (undated) DEVICE — BIPOLAR FORCEPS CORD: Brand: VALLEYLAB

## (undated) DEVICE — TUBING, SUCTION, 9/32" X 12', STRAIGHT: Brand: MEDLINE INDUSTRIES, INC.

## (undated) DEVICE — DERMABOND SKIN ADH 0.7ML -- DERMABOND ADVANCED 12/BX

## (undated) DEVICE — CARTRIDGE BNE CEM MIX UNIV TWR VAC ROTOR BRK OFF NOZ W/O

## (undated) DEVICE — HAND I-LF: Brand: MEDLINE INDUSTRIES, INC.

## (undated) DEVICE — DRAPE,U/ SHT,SPLIT,PLAS,STERIL: Brand: MEDLINE

## (undated) DEVICE — GLOVE SURG SZ 85 L12IN FNGR THK94MIL STD WHT LTX FREE

## (undated) DEVICE — TOTAL JOINT - SMH: Brand: MEDLINE INDUSTRIES, INC.

## (undated) DEVICE — PREP SKN CHLRAPRP APL 26ML STR --

## (undated) DEVICE — CONTAINER,SPECIMEN,4OZ,OR STRL: Brand: MEDLINE

## (undated) DEVICE — GLOVE SURG SZ 85 L12IN FNGR THK79MIL GRN LTX FREE

## (undated) DEVICE — SKIN PREP TRAY W/CHG: Brand: MEDLINE INDUSTRIES, INC.

## (undated) DEVICE — SOLUTION IV 50ML 0.9% SOD CHL

## (undated) DEVICE — ADHESIVE SKIN CLSR 0.7ML TOP DERMBND ADV

## (undated) DEVICE — BLADE SAW 1.27X90 MM FOR LG BNE [KMS2513PM62STE] [KOMET MEDICAL]

## (undated) DEVICE — STERILE POLYISOPRENE POWDER-FREE SURGICAL GLOVES WITH EMOLLIENT COATING: Brand: PROTEXIS

## (undated) DEVICE — SUTURE VCRL 1 L27IN ABSRB CT BRAID COAT UD J281H

## (undated) DEVICE — GAUZE SPNG XRAY 4X4IN 16PLY -- STRL

## (undated) DEVICE — SYRINGE MED 10ML LUERLOCK TIP W/O SFTY DISP

## (undated) DEVICE — DRAPE,EXTREMITY,89X128,STERILE: Brand: MEDLINE

## (undated) DEVICE — NEEDLE HYPO 25GA L1.5IN BVL ORIENTED ECLIPSE

## (undated) DEVICE — MARKER,SKIN,WI/RULER AND LABELS: Brand: MEDLINE

## (undated) DEVICE — STRAP,POSITIONING,KNEE/BODY,FOAM,4X60": Brand: MEDLINE

## (undated) DEVICE — PAD,ABDOMINAL,5"X9",ST,LF,25/BX: Brand: MEDLINE INDUSTRIES, INC.

## (undated) DEVICE — REM POLYHESIVE ADULT PATIENT RETURN ELECTRODE: Brand: VALLEYLAB

## (undated) DEVICE — SPONGE GZ W4XL4IN COT 12 PLY TYP VII WVN C FLD DSGN STERILE

## (undated) DEVICE — SYRINGE 20ML LL S/C 50

## (undated) DEVICE — BANDAGE COMPR SELF ADH 5 YDX3 IN TAN NS PREMIERPRO LTX

## (undated) DEVICE — SPONGE GZ W4XL4IN COT 12 PLY TYP VII WVN C FLD DSGN

## (undated) DEVICE — SOLUTION IRRIG 1000ML STRL H2O USP PLAS POUR BTL

## (undated) DEVICE — GAMMEX® NON-LATEX PI ORTHO SIZE 8, STERILE POLYISOPRENE POWDER-FREE SURGICAL GLOVE: Brand: GAMMEX

## (undated) DEVICE — COVER LT HNDL PLAS RIG 1 PER PK

## (undated) DEVICE — ZIMMER® STERILE DISPOSABLE TOURNIQUET CUFF WITH PROTECTIVE SLEEVE AND PLC, DUAL PORT, SINGLE BLADDER, 18 IN. (46 CM)

## (undated) DEVICE — SPONGE GZ W4XL4IN COT RADPQ HIGHLY ABSRB

## (undated) DEVICE — GLOVE SURG ALOE LF PF 6.5 -- SENSICARE

## (undated) DEVICE — DRESSING,GAUZE,XEROFORM,CURAD,1"X8",ST: Brand: CURAD

## (undated) DEVICE — SYSTEM NAVIGATION PALM SZ PRECIS ALIGN TECHNOLOGY DISP FOR

## (undated) DEVICE — YANKAUER,TAPERED BULBOUS TIP,W/O VENT: Brand: MEDLINE

## (undated) DEVICE — DRAPE SHT 3 QTR PROXIMA 53X77 --

## (undated) DEVICE — 3M™ STERI-DRAPE™ U-DRAPE 1015: Brand: STERI-DRAPE™

## (undated) DEVICE — DRESSING,GAUZE,XEROFORM,CURAD,5"X9",ST: Brand: CURAD

## (undated) DEVICE — SOL IRRIGATION INJ NACL 0.9% 500ML BTL

## (undated) DEVICE — NDL HYPO SAFE GLDE 21GX1IN --

## (undated) DEVICE — TUBING, SUCTION, 1/4" X 12', STRAIGHT: Brand: MEDLINE

## (undated) DEVICE — GUIDE PIN/SCREW

## (undated) DEVICE — TUBING, SUCTION, 1/4" X 10', STRAIGHT: Brand: MEDLINE